# Patient Record
Sex: FEMALE | Race: WHITE | NOT HISPANIC OR LATINO | URBAN - METROPOLITAN AREA
[De-identification: names, ages, dates, MRNs, and addresses within clinical notes are randomized per-mention and may not be internally consistent; named-entity substitution may affect disease eponyms.]

---

## 2018-08-07 ENCOUNTER — TELEPHONE (OUTPATIENT)
Dept: DERMATOLOGY | Facility: CLINIC | Age: 33
End: 2018-08-07

## 2018-08-07 NOTE — TELEPHONE ENCOUNTER
Dermatology Pre-visit Call:    Reason for visit : HS      Any personal history of skin cancers: No    Was the patient referred: No    If the patient was referred, are records obtained: Yes. (If no, then obtain records). Per patient records have been requested    Has the patient seen a dermatologist in the past: Yes. (If yes, obtain records)    Patient Reminders Given:  --Please, make sure you bring an updated list of your medications.   --Plan on being in our facility for approximately one hour, this includes the registration process, office visit, education and check-out process.  If you are having a procedure, more time may be required.     --If you are having a procedure, please, present 15 minutes early.  --Location reviewed.   --If you need to cancel or reschedule, call XXXX  --We look forward to seeing you in Dermatology Clinic.

## 2018-08-10 ENCOUNTER — TELEPHONE (OUTPATIENT)
Dept: DERMATOLOGY | Facility: CLINIC | Age: 33
End: 2018-08-10

## 2018-08-10 ENCOUNTER — OFFICE VISIT (OUTPATIENT)
Dept: DERMATOLOGY | Facility: CLINIC | Age: 33
End: 2018-08-10
Payer: COMMERCIAL

## 2018-08-10 DIAGNOSIS — G43.109 MIGRAINE WITH AURA AND WITHOUT STATUS MIGRAINOSUS, NOT INTRACTABLE: ICD-10-CM

## 2018-08-10 DIAGNOSIS — L70.0 ACNE VULGARIS: Primary | ICD-10-CM

## 2018-08-10 DIAGNOSIS — L73.2 HIDRADENITIS SUPPURATIVA: ICD-10-CM

## 2018-08-10 DIAGNOSIS — L70.0 ACNE VULGARIS: ICD-10-CM

## 2018-08-10 LAB
ALBUMIN SERPL-MCNC: 3.8 G/DL (ref 3.4–5)
ALP SERPL-CCNC: 95 U/L (ref 40–150)
ALT SERPL W P-5'-P-CCNC: 28 U/L (ref 0–50)
ANION GAP SERPL CALCULATED.3IONS-SCNC: 8 MMOL/L (ref 3–14)
AST SERPL W P-5'-P-CCNC: 19 U/L (ref 0–45)
BASOPHILS # BLD AUTO: 0.1 10E9/L (ref 0–0.2)
BASOPHILS NFR BLD AUTO: 0.9 %
BILIRUB SERPL-MCNC: 0.3 MG/DL (ref 0.2–1.3)
BUN SERPL-MCNC: 9 MG/DL (ref 7–30)
CALCIUM SERPL-MCNC: 8.7 MG/DL (ref 8.5–10.1)
CHLORIDE SERPL-SCNC: 108 MMOL/L (ref 94–109)
CO2 SERPL-SCNC: 24 MMOL/L (ref 20–32)
CREAT SERPL-MCNC: 0.49 MG/DL (ref 0.52–1.04)
DIFFERENTIAL METHOD BLD: ABNORMAL
EOSINOPHIL # BLD AUTO: 0.1 10E9/L (ref 0–0.7)
EOSINOPHIL NFR BLD AUTO: 2.6 %
ERYTHROCYTE [DISTWIDTH] IN BLOOD BY AUTOMATED COUNT: 13.7 % (ref 10–15)
GFR SERPL CREATININE-BSD FRML MDRD: >90 ML/MIN/1.7M2
GLUCOSE SERPL-MCNC: 86 MG/DL (ref 70–99)
HBV SURFACE AB SERPL IA-ACNC: >1000 M[IU]/ML
HBV SURFACE AG SERPL QL IA: NONREACTIVE
HCG SERPL QL: NEGATIVE
HCT VFR BLD AUTO: 37 % (ref 35–47)
HCV AB SERPL QL IA: NONREACTIVE
HGB BLD-MCNC: 11.5 G/DL (ref 11.7–15.7)
IMM GRANULOCYTES # BLD: 0 10E9/L (ref 0–0.4)
IMM GRANULOCYTES NFR BLD: 0.2 %
LYMPHOCYTES # BLD AUTO: 1.2 10E9/L (ref 0.8–5.3)
LYMPHOCYTES NFR BLD AUTO: 21.4 %
MCH RBC QN AUTO: 25.8 PG (ref 26.5–33)
MCHC RBC AUTO-ENTMCNC: 31.1 G/DL (ref 31.5–36.5)
MCV RBC AUTO: 83 FL (ref 78–100)
MONOCYTES # BLD AUTO: 0.4 10E9/L (ref 0–1.3)
MONOCYTES NFR BLD AUTO: 7.7 %
NEUTROPHILS # BLD AUTO: 3.6 10E9/L (ref 1.6–8.3)
NEUTROPHILS NFR BLD AUTO: 67.2 %
NRBC # BLD AUTO: 0 10*3/UL
NRBC BLD AUTO-RTO: 0 /100
PLATELET # BLD AUTO: 257 10E9/L (ref 150–450)
POTASSIUM SERPL-SCNC: 3.9 MMOL/L (ref 3.4–5.3)
PROT SERPL-MCNC: 7.8 G/DL (ref 6.8–8.8)
RBC # BLD AUTO: 4.46 10E12/L (ref 3.8–5.2)
SODIUM SERPL-SCNC: 140 MMOL/L (ref 133–144)
WBC # BLD AUTO: 5.4 10E9/L (ref 4–11)

## 2018-08-10 RX ORDER — CLINDAMYCIN PHOSPHATE 10 MG/G
GEL TOPICAL 2 TIMES DAILY
COMMUNITY
End: 2021-10-28

## 2018-08-10 RX ORDER — TRIAMCINOLONE ACETONIDE 1 MG/G
CREAM TOPICAL 2 TIMES DAILY
COMMUNITY
End: 2018-10-12

## 2018-08-10 ASSESSMENT — PAIN SCALES - GENERAL: PAINLEVEL: SEVERE PAIN (7)

## 2018-08-10 NOTE — NURSING NOTE
Dermatology Rooming Note    Sophia De Leon's goals for this visit include:   Chief Complaint   Patient presents with     Derm Problem     HS Sophia would like to discuss treatment options.      Genesis Buck LPN

## 2018-08-10 NOTE — TELEPHONE ENCOUNTER
Kineret Patient Referral Form completed and placed in liaison folder to obtain provider signature.    Spoke with patient to provide update.

## 2018-08-10 NOTE — TELEPHONE ENCOUNTER
PA Initiation    Medication: anakinra (Kineret) 100mg/ 0.67mL syringes  Insurance Company: [employer] - Phone [phone] Fax [phone]  Pharmacy Filling the Rx: [facility] [place], [place] - [address] [facility]  Filling Pharmacy Phone:    Filling Pharmacy Fax:    Start Date: 8/10/2018

## 2018-08-10 NOTE — LETTER
8/10/2018       RE: Sophia De Leon  [address] [facility] [address]     Dear Colleague,    Thank you for referring your patient, Sophia De Leon, to the [facility] DERMATOLOGY at [facility]. Please see a copy of my visit note below.    Dermatology Clinic Visit Note    CHIEF COMPLAINT:  Hidradenitis suppurativa.      DERMATOLOGY PROBLEM LIST:    1. Hidradenitis suppurativa involving the central chest, inframammary breasts and inguinal creases as well as intergluteal cleft.  Present since her 20s.    -Past treatments have included a variety of oral antibiotics, including minocycline and oral rifampin.    -Treated with Humira, which resulted in migraine headaches.    -Placed on isotretinoin, which she was unable to tolerate due to symptoms of dryness, headaches and dizziness.    -Past use of topical clindamycin and antimicrobial body washes.   -Past intralesional kenalog without benefit   2. Acne vulgaris: Face and back with periorifical dermatitis component    HPI: See above past treatment. She states that all treatments helped initially, but then the lesions again began to flare, especially during her pregnancies.  She has also had multiple intralesional Kenalog injections, but again notes that these had only been temporarily effective.  Ms. De Leon notes that she has ongoing drainage and pain from the areas of hidradenitis.      The patient has 3 children.  She is not planning any additional pregnancies.  She is currently abstinent or uses condoms when she is sexually active.      In addition, the patient has severe acne on the face.  She was given a prescription for spironolactone 25 mg.  She did not initiate this treatment due to concern for lightheadedness/dizziness.      PAST MEDICAL HISTORY:   1.  Hidradenitis suppurativa.   2.  Acne vulgaris.   3.  Migraine headaches with aura.      SOCIAL HISTORY:  The patient lives with her  and 3 children in [place].      REVIEW OF  SYSTEMS:  Feels well without additional skin concerns.      FAMILY HISTORY:  No family history of hidradenitis or skin cancer.      PHYSICAL EXAMINATION:   GENERAL:  The patient is a healthy-appearing, 32-year-old female in no distress.   HEENT:  Conjunctivae are clear.   PULMONARY:  Breathing comfortably on room air.   ABDOMEN:  No abdominal distention.   SKIN:  Exam today includes the face, neck, chest, abdomen, back, arms, legs, hands, feet and genital area.  Skin exam is normal except for as follows:   - Examination of the central chest, the superior shoulders, the upper back, the upper and lower cutaneous lips, forehead and nose with inflammatory, pink papules and pustules. Lower cutaneous lip and chin with increased pink monomorphic papules  - Draining, indurated interconnected sinus tracts over the entirety of the mons pubis, the intergluteal cleft and the inframammary folds with yellow and white discharge, nodularity, tenderness and surrounding induration with scarring sinus tracts.      ASSESSMENT AND PLAN:   1.  Hidradenitis suppurativa.  Beltran stage III involving the inframammary folds, inguinal creases and gluteal cleft.  I discussed a variety of treatment options.  As Ms. De Leon did not tolerate Humira well, I was reluctant to consider initiation of infliximab infusions.    -I suggested a trial of anakinra, as this would work via IL1 receptors as opposed to TNF alpha and would provide less concern of immunosuppression.   I will prescribe anakinra 100 mg subcutaneous daily.   -Laboratory studies today were collected, including CBC, hepatitis B-C serologies, CMP, TB QuantiFERON gold testing.  -Continue with antimicrobial benzyl peroxide wash and topical clindamycin solution.     2.  Inflammatory acne on the face, chest and back.  Noted that anakinra may help with the acne.  I encouraged her to initiate her spironolactone 25 mg daily, as I suspect that this will have no impact on blood pressure.  Should  she develop headaches or dizziness, she may discontinue this medication.  If she is having no associated symptoms, we would plan to increase dosing at next visit.      I will see Ms. De Leon back in 2 months' time for reevaluation.      Thank you for this consultation.     Omayra Lerma MD  Dermatology Staff

## 2018-08-10 NOTE — PROGRESS NOTES
Dermatology Clinic Visit Note        CHIEF COMPLAINT:  Hidradenitis suppurativa.      DERMATOLOGY PROBLEM LIST:    1. Hidradenitis suppurativa involving the central chest, inframammary breasts and inguinal creases as well as intergluteal cleft.  Present since her 20s.    -Past treatments have included a variety of oral antibiotics, including minocycline and oral rifampin.    -Treated with Humira, which resulted in migraine headaches.    -Placed on isotretinoin, which she was unable to tolerate due to symptoms of dryness, headaches and dizziness.    -Past use of topical clindamycin and antimicrobial body washes.   -Past intralesional kenalog without benefit   2. Acne vulgaris: Face and back with periorifical dermatitis component    HPI: See above past treatment. She states that all treatments helped initially, but then the lesions again began to flare, especially during her pregnancies.  She has also had multiple intralesional Kenalog injections, but again notes that these had only been temporarily effective.  Ms. De Leon notes that she has ongoing drainage and pain from the areas of hidradenitis.      The patient has 3 children.  She is not planning any additional pregnancies.  She is currently abstinent or uses condoms when she is sexually active.      In addition, the patient has severe acne on the face.  She was given a prescription for spironolactone 25 mg.  She did not initiate this treatment due to concern for lightheadedness/dizziness.      PAST MEDICAL HISTORY:   1.  Hidradenitis suppurativa.   2.  Acne vulgaris.   3.  Migraine headaches with aura.      SOCIAL HISTORY:  The patient lives with her  and 3 children in [place].      REVIEW OF SYSTEMS:  Feels well without additional skin concerns.      FAMILY HISTORY:  No family history of hidradenitis or skin cancer.      PHYSICAL EXAMINATION:   GENERAL:  The patient is a healthy-appearing, 32-year-old female in no distress.   HEENT:  Conjunctivae are  clear.   PULMONARY:  Breathing comfortably on room air.   ABDOMEN:  No abdominal distention.   SKIN:  Exam today includes the face, neck, chest, abdomen, back, arms, legs, hands, feet and genital area.  Skin exam is normal except for as follows:   - Examination of the central chest, the superior shoulders, the upper back, the upper and lower cutaneous lips, forehead and nose with inflammatory, pink papules and pustules. Lower cutaneous lip and chin with increased pink monomorphic papules  - Draining, indurated interconnected sinus tracts over the entirety of the mons pubis, the intergluteal cleft and the inframammary folds with yellow and white discharge, nodularity, tenderness and surrounding induration with scarring sinus tracts.      ASSESSMENT AND PLAN:   1.  Hidradenitis suppurativa.  Beltran stage III involving the inframammary folds, inguinal creases and gluteal cleft.  I discussed a variety of treatment options.  As Ms. De Leon did not tolerate Humira well, I was reluctant to consider initiation of infliximab infusions.    -I suggested a trial of anakinra, as this would work via IL1 receptors as opposed to TNF alpha and would provide less concern of immunosuppression.   I will prescribe anakinra 100 mg subcutaneous daily.   -Laboratory studies today were collected, including CBC, hepatitis B-C serologies, CMP, TB QuantiFERON gold testing.  -Continue with antimicrobial benzyl peroxide wash and topical clindamycin solution.     2.  Inflammatory acne on the face, chest and back.  Noted that anakinra may help with the acne.  I encouraged her to initiate her spironolactone 25 mg daily, as I suspect that this will have no impact on blood pressure.  Should she develop headaches or dizziness, she may discontinue this medication.  If she is having no associated symptoms, we would plan to increase dosing at next visit.      I will see Ms. De Leon back in 2 months' time for reevaluation.      Thank you for this  consultation.     Omayra Lerma MD  Dermatology Staff      Copy: Referred Self, MD  No address on file

## 2018-08-10 NOTE — MR AVS SNAPSHOT
After Visit Summary   8/10/2018    Sophia De Leon    MRN: [id number]           Patient Information     Date Of Birth          1985        Visit Information        Provider Department      8/10/2018 10:15 AM Omayra Lerma MD [facility] Dermatology        Today's Diagnoses     Acne vulgaris    -  1       Follow-ups after your visit        Your next 10 appointments already scheduled     Oct 12, 2018 11:30 AM CDT   (Arrive by 11:15 AM)   Return Visit with Omayra Lerma MD   [facility] ([facility])    [address] [place] [address] [id number] [phone]              Future tests that were ordered for you today     Open Future Orders        Priority Expected Expires Ordered    CBC with platelets differential Routine  8/10/2019 8/10/2018    Comprehensive metabolic panel Routine  8/10/2019 8/10/2018    M Tuberculosis by Quantiferon Routine  8/10/2019 8/10/2018    Hepatitis B Surface Antibody Routine  8/10/2019 8/10/2018    Hepatitis B surface antigen Routine  8/10/2019 8/10/2018    Hepatitis C antibody Routine  8/10/2019 8/10/2018    HCG Qual, Blood ([id number]) Routine  8/10/2019 8/10/2018            Who to contact     Please call your clinic at [phone] to:    Ask questions about your health    Make or cancel appointments    Discuss your medicines    Learn about your test results    Speak to your doctor            Additional Information About Your Visit        MyChart Information     [employer] is an electronic gateway that provides easy, online access to your medical records. With Ed4U, you can request a clinic appointment, read your test results, renew a prescription or communicate with your care team.     To sign up for [employer] visit the website at [email or web address]   You will be asked to enter the access code listed below, as well as some personal information. Please follow the directions to create your username and  password.     Your access code is: [email or web address]  Expires: 2018 11:03 AM     Your access code will  in 90 days. If you need help or a new code, please contact your [facility] or call [phone] for assistance.        Care EveryWhere ID     This is your Care EveryWhere ID. This could be used by other organizations to access your [facility] medical records  [id number]         Blood Pressure from Last 3 Encounters:   No data found for BP    Weight from Last 3 Encounters:   No data found for Wt                 Today's Medication Changes          These changes are accurate as of 8/10/18 10:39 AM.  If you have any questions, ask your nurse or doctor.               Start taking these medicines.        Dose/Directions    metroNIDAZOLE 0.75 % cream   Commonly known as:  METROCREAM   Used for:  Acne vulgaris   Started by:  Omayra Lerma MD        To face daily   Quantity:  45 g   Refills:  11            Where to get your medicines      These medications were sent to [facility] [address] [place], [place] [address] [id number] [facility]  [id number] [place] [address] [facility] [address]    Hours:  24-hours Phone:  [phone]     metroNIDAZOLE 0.75 % cream                Primary Care Provider    None Specified       No primary provider on file.        Equal Access to Services     GARRETT GIBSON AH: Emilie peraltao Somadeleineali, waaxda luqadaha, qaybta kaalmada adeegyada, sharon steen. So [facility] [phone].    ATENCIÓN: Si habla español, tiene a thapa disposición servicios gratuitos de asistencia lingüística. Llame al [phone].    We comply with applicable federal civil rights laws and [place] laws. We do not discriminate on the basis of race, color, national origin, age, disability, sex, sexual orientation, or gender identity.            Thank you!     Thank you for choosing [facility] DERMATOLOGY  for your care. Our goal  is always to provide you with excellent care. Hearing back from our patients is one way we can continue to improve our services. Please take a few minutes to complete the written survey that you may receive in the mail after your visit with us. Thank you!             Your Updated Medication List - Protect others around you: Learn how to safely use, store and throw away your medicines at www.disposemymeds.org.          This list is accurate as of 8/10/18 10:39 AM.  Always use your most recent med list.                   Brand Name Dispense Instructions for use Diagnosis    clindamycin 1 % topical gel    CLINDAMAX     Apply topically 2 times daily    Acne vulgaris       IBUPROFEN PO      PRN    Acne vulgaris       metroNIDAZOLE 0.75 % cream    METROCREAM    45 g    To face daily    Acne vulgaris       triamcinolone 0.1 % cream    KENALOG     Apply topically 2 times daily    Acne vulgaris       TYLENOL PO      Take 500 mg by mouth PRN    Acne vulgaris

## 2018-08-12 PROBLEM — G43.109 MIGRAINE WITH AURA AND WITHOUT STATUS MIGRAINOSUS, NOT INTRACTABLE: Status: ACTIVE | Noted: 2018-08-12

## 2018-08-12 PROBLEM — L73.2 HIDRADENITIS SUPPURATIVA: Status: ACTIVE | Noted: 2018-08-12

## 2018-08-12 PROBLEM — L70.0 ACNE VULGARIS: Status: ACTIVE | Noted: 2018-08-12

## 2018-08-13 LAB
GAMMA INTERFERON BACKGROUND BLD IA-ACNC: 0.04 IU/ML
M TB IFN-G BLD-IMP: NEGATIVE
M TB IFN-G CD4+ BCKGRND COR BLD-ACNC: >10 IU/ML
MITOGEN IGNF BCKGRD COR BLD-ACNC: 0 IU/ML
MITOGEN IGNF BCKGRD COR BLD-ACNC: 0.01 IU/ML

## 2018-08-13 NOTE — TELEPHONE ENCOUNTER
Prior Authorization Approval    Authorization Effective Date: 7/11/2018  Authorization Expiration Date: 8/10/2019  Medication: anakinra (Kineret) 100mg/ 0.67mL syringes   Approved Dose/Quantity: 1 injection daily  Reference #: [facility] key# [id number]   Insurance Company: [employer] - Phone [phone] Fax [phone]  Expected CoPay: $0     CoPay Card Available: No   Foundation Assistance Needed:    Which Pharmacy is filling the prescription (Not needed for infusion/clinic administered): [facility] [place], [place] - [address] [place] [address]  Pharmacy Notified: Yes  Patient Notified: Yes    **Escript released to [employer] [facility] pharm; approval faxed there as well

## 2018-08-13 NOTE — TELEPHONE ENCOUNTER
Referral form, insurance info, and [place] approval faxed to [facility] via fax# [phone].

## 2018-10-12 ENCOUNTER — OFFICE VISIT (OUTPATIENT)
Dept: DERMATOLOGY | Facility: CLINIC | Age: 33
End: 2018-10-12
Payer: COMMERCIAL

## 2018-10-12 DIAGNOSIS — L70.0 ACNE VULGARIS: Primary | ICD-10-CM

## 2018-10-12 DIAGNOSIS — L73.2 HIDRADENITIS SUPPURATIVA: ICD-10-CM

## 2018-10-12 RX ORDER — TRIAMCINOLONE ACETONIDE 1 MG/G
CREAM TOPICAL
Qty: 80 G | Refills: 1 | Status: SHIPPED | OUTPATIENT
Start: 2018-10-12 | End: 2022-03-29

## 2018-10-12 ASSESSMENT — PAIN SCALES - GENERAL: PAINLEVEL: NO PAIN (0)

## 2018-10-12 NOTE — LETTER
"10/12/2018       RE: Sophia De Leon  [address] [address]     Dear Colleague,    Thank you for referring your patient, Sophia De Leon, to the [facility] DERMATOLOGY at [facility]. Please see a copy of my visit note below.    [facility] Dermatology Note      Dermatology Problem List:  1. Hidradenitis suppurativa involving the central chest, inframammary breasts and inguinal creases as well as intergluteal cleft.  Present since her 20s.    -Past treatments have included a variety of oral antibiotics, including minocycline and oral rifampin.    -Treated with Humira, which resulted in migraine headaches.    -Placed on isotretinoin, which she was unable to tolerate due to symptoms of dryness, headaches and dizziness.    -Past use of topical clindamycin and antimicrobial body washes.   -Past intralesional kenalog without benefit   2. Acne vulgaris: Face and back with periorifical dermatitis component    Encounter Date: Oct 12, 2018    CC:   Chief Complaint   Patient presents with     Derm Problem     H S follow up, Sophia states \" it is off and on.\"        History of Present Illness:  Ms. Sophia De Leon is a 32 year old female who presents as a follow-up for Beltran stage III hidradenitis suppurativa and inflammatory acne.  She has a long history of hidradenitis since her 20s and has tried multiple topicals, intralesional Kenalog, and oral medications without significant improvement.  At the last visit on August 10, 2018, she was started on Anakinra 100 mg subcutaneous daily.  She was to continue benzoyl peroxide wash and topical clindamycin solution. For her acne, she was also initiated on spironolactone 25 mg daily.    Since her last visit, she did not start the anakinra or spironolactone.  She has fearful that the medications will not work, she has been disappointed in the past with multiple others.  She is also concerned about side effects.  She " wants to discuss these medications in detail more today.  She has been using clindamycin solution, benzoyl peroxide wash, and salicylic acid wash.      Past Medical History:   Patient Active Problem List   Diagnosis     Acne vulgaris     Hidradenitis suppurativa     Migraine with aura and without status migrainosus, not intractable     No past medical history on file.  No past surgical history on file.    Social History:   reports that she has never smoked. She has never used smokeless tobacco.    Family History:  Family History   Problem Relation Age of Onset     Melanoma No family hx of      Skin Cancer No family hx of        Medications:  Current Outpatient Prescriptions   Medication Sig Dispense Refill     Acetaminophen (TYLENOL PO) Take 500 mg by mouth PRN       clindamycin (CLINDAMAX) 1 % topical gel Apply topically 2 times daily       IBUPROFEN PO PRN       metroNIDAZOLE (METROCREAM) 0.75 % cream To face daily 45 g 11     triamcinolone (KENALOG) 0.1 % cream Apply topically 2 times daily       anakinra (KINERET) 100 MG/0.67ML SOSY injection Inject 0.67 mLs (100 mg) Subcutaneous daily (Patient not taking: Reported on 10/12/2018) 20.1 mL 3        No Known Allergies      Review of Systems:  -Constitutional: The patient denies fatigue, fevers, chills, unintended weight loss, and night sweats.  -HEENT: Patient denies nonhealing oral sores.  -Skin: As above in HPI. No additional skin concerns.    Physical exam:  Vitals: There were no vitals taken for this visit.  GEN: This is a well developed, well-nourished female in no acute distress, in a pleasant mood.    SKIN: Focused examination of the chest and back was performed.  -Indurated interconnecting sinus tracts with nodularity numerous over the central chest and inframammary folds.  -Numerous inflamed papules with scattered hyperpigmented macules over back and upper chest  -No other lesions of concern on areas examined.     Impression/Plan:  1. Hidradenitis  Suppurativa - Beltran Stage III with involvement of the inframammary fold, central chest, inguinal creases and gluteal cleft.  Patient has previously failed multiple treatments in the past including both topical and oral medications.  She did not tolerate Humira, so still reluctant to consider Infliximab.  Still feel that the best option for this patient will be Anakinra.    Again discussed in detail Anakinra including mechanism of action, dosing and possible side effects.    Encouraged patient to start Anakinra at this time.    Will look into pharmacy injection teaching.    2. Inflammatory Acne - Involvement of the face, chest and back.     Again discussed that anakinra may help with the acne, however feel that addition of spironolactone may be of benefit.    Reviewed in detail Spironolactone including dosing and possible side effects.    Start Spironolactone 25mg daily, with plan to increase dose at next visit if well tolerated.    3. NUB - Dermal nevus vs. Poroma vs neuroma on right 4th finger, palmar aspect     Appears benign on examination    Will plan to shave at next visit.      Follow-up in 3 months, earlier for new or changing lesions.       Dr. Lerma staffed the patient.    Staff Involved:  Resident(Radha Sheldon)/Staff(as above)    I have personally examined this patient and agree with Dr. Sheldon' documentation and plan of care. I have reviewed and amended the resident's note above. The documentation accurately reflects my clinical observations, diagnoses, treatment and follow-up plans.     Omayra Lerma MD  Dermatology Staff      Again, thank you for allowing me to participate in the care of your patient.      Sincerely,    Omayra Lerma MD

## 2018-10-12 NOTE — LETTER
Date:October 15, 2018      Patient was self referred, no letter generated. Do not send.        [facility] Health Information

## 2018-10-12 NOTE — NURSING NOTE
"Dermatology Rooming Note    Sophia De Leon's goals for this visit include:   Chief Complaint   Patient presents with     Derm Problem     H S follow up, Sophia states \" it is off and on.\"      Genesis Buck LPN   "

## 2018-10-12 NOTE — MR AVS SNAPSHOT
After Visit Summary   10/12/2018    Sophia De Leon    MRN: [id number]           Patient Information     Date Of Birth          1985        Visit Information        Provider Department      10/12/2018 11:30 AM Omayra Lerma MD M [facility] Dermatology         Follow-ups after your visit        Your next 10 appointments already scheduled     2019 11:45 AM CST   (Arrive by 11:30 AM)   Return Visit with MD JONNATHAN Mojica [facility] Dermatology ([facility])    [address] [place] [id number] [phone]              Who to contact     Please call your clinic at [phone] to:    Ask questions about your health    Make or cancel appointments    Discuss your medicines    Learn about your test results    Speak to your doctor            Additional Information About Your Visit        MyChart Information     Geelbe is an electronic gateway that provides easy, online access to your medical records. With Geelbe, you can request a clinic appointment, read your test results, renew a prescription or communicate with your care team.     To sign up for [email or web address] visit the website at [email or web address]   You will be asked to enter the access code listed below, as well as some personal information. Please follow the directions to create your username and password.     Your access code is: [email or web address]  Expires: 2018  6:31 AM     Your access code will  in 90 days. If you need help or a new code, please contact your [facility] or call [phone] for assistance.        Care EveryWhere ID     This is your Care EveryWhere ID. This could be used by other organizations to access your May medical records  [id number]         Blood Pressure from Last 3 Encounters:   No data found for BP    Weight from Last 3 Encounters:   No data found for Wt              Today, you had the following     No  orders found for display       Primary Care Provider    None Specified       No primary provider on file.        Equal Access to Services     FLETCHERPATRICIA ARTHUR : Hadii renée Escoto, jose luis galarza, franciscoamanuel leemasharon leeangelambreen steen. So [facility] [phone].    ATENCIÓN: Si habla español, tiene a thapa disposición servicios gratuitos de asistencia lingüística. Llame al [phone].    We comply with applicable federal civil rights laws and [place] laws. We do not discriminate on the basis of race, color, national origin, age, disability, sex, sexual orientation, or gender identity.            Thank you!     Thank you for choosing [facility] DERMATOLOGY  for your care. Our goal is always to provide you with excellent care. Hearing back from our patients is one way we can continue to improve our services. Please take a few minutes to complete the written survey that you may receive in the mail after your visit with us. Thank you!             Your Updated Medication List - Protect others around you: Learn how to safely use, store and throw away your medicines at www.disposemymeds.org.          This list is accurate as of 10/12/18 12:37 PM.  Always use your most recent med list.                   Brand Name Dispense Instructions for use Diagnosis    anakinra 100 MG/0.67ML Sosy injection    KINERET    20.1 mL    Inject 0.67 mLs (100 mg) Subcutaneous daily    Hidradenitis suppurativa       clindamycin 1 % topical gel    CLINDAMAX     Apply topically 2 times daily    Acne vulgaris       IBUPROFEN PO      PRN    Acne vulgaris       metroNIDAZOLE 0.75 % cream    METROCREAM    45 g    To face daily    Acne vulgaris       triamcinolone 0.1 % cream    KENALOG     Apply topically 2 times daily    Acne vulgaris       TYLENOL PO      Take 500 mg by mouth PRN    Acne vulgaris

## 2018-10-12 NOTE — PROGRESS NOTES
"[facility] Dermatology Note      Dermatology Problem List:  1. Hidradenitis suppurativa involving the central chest, inframammary breasts and inguinal creases as well as intergluteal cleft.  Present since her 20s.    -Past treatments have included a variety of oral antibiotics, including minocycline and oral rifampin.    -Treated with Humira, which resulted in migraine headaches.    -Placed on isotretinoin, which she was unable to tolerate due to symptoms of dryness, headaches and dizziness.    -Past use of topical clindamycin and antimicrobial body washes.   -Past intralesional kenalog without benefit   2. Acne vulgaris: Face and back with periorifical dermatitis component    Encounter Date: Oct 12, 2018    CC:   Chief Complaint   Patient presents with     Derm Problem     H S follow up, Sophia states \" it is off and on.\"        History of Present Illness:  Ms. Sophia De Leon is a 32 year old female who presents as a follow-up for Beltran stage III hidradenitis suppurativa and inflammatory acne.  She has a long history of hidradenitis since her 20s and has tried multiple topicals, intralesional Kenalog, and oral medications without significant improvement.  At the last visit on August 10, 2018, she was started on Anakinra 100 mg subcutaneous daily.  She was to continue benzoyl peroxide wash and topical clindamycin solution. For her acne, she was also initiated on spironolactone 25 mg daily.    Since her last visit, she did not start the anakinra or spironolactone.  She has fearful that the medications will not work, she has been disappointed in the past with multiple others.  She is also concerned about side effects.  She wants to discuss these medications in detail more today.  She has been using clindamycin solution, benzoyl peroxide wash, and salicylic acid wash.      Past Medical History:   Patient Active Problem List   Diagnosis     Acne vulgaris     Hidradenitis suppurativa     Migraine " with aura and without status migrainosus, not intractable     No past medical history on file.  No past surgical history on file.    Social History:   reports that she has never smoked. She has never used smokeless tobacco.    Family History:  Family History   Problem Relation Age of Onset     Melanoma No family hx of      Skin Cancer No family hx of        Medications:  Current Outpatient Prescriptions   Medication Sig Dispense Refill     Acetaminophen (TYLENOL PO) Take 500 mg by mouth PRN       clindamycin (CLINDAMAX) 1 % topical gel Apply topically 2 times daily       IBUPROFEN PO PRN       metroNIDAZOLE (METROCREAM) 0.75 % cream To face daily 45 g 11     triamcinolone (KENALOG) 0.1 % cream Apply topically 2 times daily       anakinra (KINERET) 100 MG/0.67ML SOSY injection Inject 0.67 mLs (100 mg) Subcutaneous daily (Patient not taking: Reported on 10/12/2018) 20.1 mL 3        No Known Allergies      Review of Systems:  -Constitutional: The patient denies fatigue, fevers, chills, unintended weight loss, and night sweats.  -HEENT: Patient denies nonhealing oral sores.  -Skin: As above in HPI. No additional skin concerns.    Physical exam:  Vitals: There were no vitals taken for this visit.  GEN: This is a well developed, well-nourished female in no acute distress, in a pleasant mood.    SKIN: Focused examination of the chest and back was performed.  -Indurated interconnecting sinus tracts with nodularity numerous over the central chest and inframammary folds.  -Numerous inflamed papules with scattered hyperpigmented macules over back and upper chest  -No other lesions of concern on areas examined.     Impression/Plan:  1. Hidradenitis Suppurativa - Beltran Stage III with involvement of the inframammary fold, central chest, inguinal creases and gluteal cleft.  Patient has previously failed multiple treatments in the past including both topical and oral medications.  She did not tolerate Humira, so still reluctant to  consider Infliximab.  Still feel that the best option for this patient will be Anakinra.    Again discussed in detail Anakinra including mechanism of action, dosing and possible side effects.    Encouraged patient to start Anakinra at this time.    Will look into pharmacy injection teaching.    2. Inflammatory Acne - Involvement of the face, chest and back.     Again discussed that anakinra may help with the acne, however feel that addition of spironolactone may be of benefit.    Reviewed in detail Spironolactone including dosing and possible side effects.    Start Spironolactone 25mg daily, with plan to increase dose at next visit if well tolerated.    3. NUB - Dermal nevus vs. Poroma vs neuroma on right 4th finger, palmar aspect     Appears benign on examination    Will plan to shave at next visit.      Follow-up in 3 months, earlier for new or changing lesions.       Dr. Lerma staffed the patient.    Staff Involved:  Resident(Radha Sheldon)/Staff(as above)    I have personally examined this patient and agree with Dr. Sheldon' documentation and plan of care. I have reviewed and amended the resident's note above. The documentation accurately reflects my clinical observations, diagnoses, treatment and follow-up plans.     Omayra Lerma MD  Dermatology Staff

## 2019-05-10 ENCOUNTER — OFFICE VISIT (OUTPATIENT)
Dept: DERMATOLOGY | Facility: CLINIC | Age: 34
End: 2019-05-10
Payer: COMMERCIAL

## 2019-05-10 DIAGNOSIS — D48.5 NEOPLASM OF UNCERTAIN BEHAVIOR OF SKIN: ICD-10-CM

## 2019-05-10 DIAGNOSIS — L73.2 HIDRADENITIS SUPPURATIVA: Primary | ICD-10-CM

## 2019-05-10 DIAGNOSIS — L30.9 DERMATITIS: ICD-10-CM

## 2019-05-10 RX ORDER — TACROLIMUS 1 MG/G
OINTMENT TOPICAL 2 TIMES DAILY
Qty: 100 G | Refills: 3 | Status: SHIPPED | OUTPATIENT
Start: 2019-05-10 | End: 2022-11-28

## 2019-05-10 ASSESSMENT — PAIN SCALES - GENERAL: PAINLEVEL: MODERATE PAIN (5)

## 2019-05-10 NOTE — LETTER
Date:May 14, 2019      Patient was self referred, no letter generated. Do not send.        [facility] Health Information

## 2019-05-10 NOTE — LETTER
"5/10/2019       RE: Sophia De Leon  [address] [address]     Dear Colleague,    Thank you for referring your patient, Sophia De Leon, to the [facility] DERMATOLOGY at [facility]. Please see a copy of my visit note below.    [facility] Dermatology Note      Dermatology Problem List:  1. Hidradenitis suppurativa involving the central chest, inframammary breasts and inguinal creases as well as intergluteal cleft.  Present since her 20s.    -Current: Clindamycin gel, BP wash, sal acid wash  -Past: oral antibiotics (minocycline and rifampin), Humira (migraines), isotretinoin (dryness, headaches and dizziness), ILK (no benefit?), spironolactone (she didn't start). Anakinra prescribed (did not start).  2. Acne vulgaris: Face and back with periorifical dermatitis component  3. Irritant dermatitis in areas of Hs, starting protopic 0.1% BID  4. Neoplasm of uncertain behavior on the R 4th finger- derm surgery referral.     Encounter Date: May 10, 2019    CC:   Chief Complaint   Patient presents with     Derm Problem     HS follow up, Sophia states \" It is about the same.\"        History of Present Illness:  Ms. Sophia De Leon is a 33 year old female who presents as a follow-up for Beltran stage III hidradenitis suppurativa and inflammatory acne.  She has a long history of hidradenitis since her 20s and has tried multiple topicals, intralesional Kenalog, and oral medications without significant improvement.      Since her last visit, she did not start the anakinra or spironolactone and in the past has been concerned about side effects. She is using clindamycin gel and has used triamcinolone to areas of itching scars.     Past Medical History:   Patient Active Problem List   Diagnosis     Acne vulgaris     Hidradenitis suppurativa     Migraine with aura and without status migrainosus, not intractable     No past medical history on file.  No past surgical " history on file.    Social History:   reports that she has never smoked. She has never used smokeless tobacco.    Family History:  Family History   Problem Relation Age of Onset     Melanoma No family hx of      Skin Cancer No family hx of        Medications:  Current Outpatient Medications   Medication Sig Dispense Refill     clindamycin (CLINDAMAX) 1 % topical gel Apply topically 2 times daily       triamcinolone (KENALOG) 0.1 % cream Twice daily to rash area on the abdomen as needed. Avoid face. 80 g 1     Acetaminophen (TYLENOL PO) Take 500 mg by mouth PRN       anakinra (KINERET) 100 MG/0.67ML SOSY injection Inject 0.67 mLs (100 mg) Subcutaneous daily (Patient not taking: Reported on 10/12/2018) 20.1 mL 3     IBUPROFEN PO PRN       metroNIDAZOLE (METROCREAM) 0.75 % cream To face daily 45 g 11        No Known Allergies      Review of Systems:  -Constitutional: The patient denies fatigue, fevers, chills, unintended weight loss, and night sweats.  -HEENT: Patient denies nonhealing oral sores.  -Skin: As above in HPI. No additional skin concerns.    Physical exam:  Vitals: There were no vitals taken for this visit.  GEN: This is a well developed, well-nourished female in no acute distress, in a pleasant mood.    SKIN: Focused examination of the chest and back was performed.  -Indurated interconnecting sinus tracts with nodularity numerous over the central chest and inframammary folds.  -Numerous inflamed papules with scattered hyperpigmented macules over back and upper chest  -No other lesions of concern on areas examined.   - R palmar 4th finger with 11 mm soft plaque    Impression/Plan:  1. Hidradenitis Suppurativa - Beltran Stage III with involvement of the inframammary fold, central chest, inguinal creases and gluteal cleft.  Patient has previously failed multiple treatments in the past including both topical and oral medications.  She did not tolerate Humira, so still reluctant to consider other options  including spironolactone, anakinra, other TNF blockers. Could consider otezla or metformin, but patient not interested in treatment at the time. Protopic 0.1% to areas of irritation over scars, avoid triamcinolone.     2. Inflammatory Acne - Involvement of the face, chest and back.     Clindamycin gel    3. NUB - suspected neuroma on right 4th finger, palmar aspect     Derm Surg referral due to size and location    Follow-up in 3 months, earlier for new or changing lesions.       Dr. Lerma staffed the patient.    Staff Involved:  Resident(Angela)/Staff(as above)      I have personally examined this patient and agree with Dr. Miller's documentation and plan of care. I have reviewed and amended the resident's note above. The documentation accurately reflects my clinical observations, diagnoses, treatment and follow-up plans.     Omayra Lerma MD  Dermatology Staff      Again, thank you for allowing me to participate in the care of your patient.      Sincerely,    Omayra Lerma MD

## 2019-05-10 NOTE — NURSING NOTE
"Dermatology Rooming Note    Sophia De Leon's goals for this visit include:   Chief Complaint   Patient presents with     Derm Problem     HS follow up, Sophia states \" It is about the same.\"      Genesis Buck LPN   "

## 2019-05-10 NOTE — PROGRESS NOTES
"[facility] Dermatology Note      Dermatology Problem List:  1. Hidradenitis suppurativa involving the central chest, inframammary breasts and inguinal creases as well as intergluteal cleft.  Present since her 20s.    -Current: Clindamycin gel, BP wash, sal acid wash  -Past: oral antibiotics (minocycline and rifampin), Humira (migraines), isotretinoin (dryness, headaches and dizziness), ILK (no benefit?), spironolactone (she didn't start). Anakinra prescribed (did not start).  2. Acne vulgaris: Face and back with periorifical dermatitis component  3. Irritant dermatitis in areas of Hs, starting protopic 0.1% BID  4. Neoplasm of uncertain behavior on the R 4th finger- derm surgery referral.     Encounter Date: May 10, 2019    CC:   Chief Complaint   Patient presents with     Derm Problem     HS follow up, Sophia states \" It is about the same.\"        History of Present Illness:  Ms. Sophia De Leon is a 33 year old female who presents as a follow-up for Beltran stage III hidradenitis suppurativa and inflammatory acne.  She has a long history of hidradenitis since her 20s and has tried multiple topicals, intralesional Kenalog, and oral medications without significant improvement.      Since her last visit, she did not start the anakinra or spironolactone and in the past has been concerned about side effects. She is using clindamycin gel and has used triamcinolone to areas of itching scars.     Past Medical History:   Patient Active Problem List   Diagnosis     Acne vulgaris     Hidradenitis suppurativa     Migraine with aura and without status migrainosus, not intractable     No past medical history on file.  No past surgical history on file.    Social History:   reports that she has never smoked. She has never used smokeless tobacco.    Family History:  Family History   Problem Relation Age of Onset     Melanoma No family hx of      Skin Cancer No family hx of        Medications:  Current Outpatient " Medications   Medication Sig Dispense Refill     clindamycin (CLINDAMAX) 1 % topical gel Apply topically 2 times daily       triamcinolone (KENALOG) 0.1 % cream Twice daily to rash area on the abdomen as needed. Avoid face. 80 g 1     Acetaminophen (TYLENOL PO) Take 500 mg by mouth PRN       anakinra (KINERET) 100 MG/0.67ML SOSY injection Inject 0.67 mLs (100 mg) Subcutaneous daily (Patient not taking: Reported on 10/12/2018) 20.1 mL 3     IBUPROFEN PO PRN       metroNIDAZOLE (METROCREAM) 0.75 % cream To face daily 45 g 11        No Known Allergies      Review of Systems:  -Constitutional: The patient denies fatigue, fevers, chills, unintended weight loss, and night sweats.  -HEENT: Patient denies nonhealing oral sores.  -Skin: As above in HPI. No additional skin concerns.    Physical exam:  Vitals: There were no vitals taken for this visit.  GEN: This is a well developed, well-nourished female in no acute distress, in a pleasant mood.    SKIN: Focused examination of the chest and back was performed.  -Indurated interconnecting sinus tracts with nodularity numerous over the central chest and inframammary folds.  -Numerous inflamed papules with scattered hyperpigmented macules over back and upper chest  -No other lesions of concern on areas examined.   - R palmar 4th finger with 11 mm soft plaque    Impression/Plan:  1. Hidradenitis Suppurativa - Beltran Stage III with involvement of the inframammary fold, central chest, inguinal creases and gluteal cleft.  Patient has previously failed multiple treatments in the past including both topical and oral medications.  She did not tolerate Humira, so still reluctant to consider other options including spironolactone, anakinra, other TNF blockers. Could consider otezla or metformin, but patient not interested in treatment at the time. Protopic 0.1% to areas of irritation over scars, avoid triamcinolone.     2. Inflammatory Acne - Involvement of the face, chest and back.      Clindamycin gel    3. NUB - suspected neuroma on right 4th finger, palmar aspect     Derm Surg referral due to size and location    Follow-up in 3 months, earlier for new or changing lesions.       Dr. Lerma staffed the patient.    Staff Involved:  Resident(Angela)/Staff(as above)      I have personally examined this patient and agree with Dr. Miller's documentation and plan of care. I have reviewed and amended the resident's note above. The documentation accurately reflects my clinical observations, diagnoses, treatment and follow-up plans.     Omayra Lerma MD  Dermatology Staff

## 2019-06-06 NOTE — TELEPHONE ENCOUNTER
FUTURE VISIT INFORMATION      FUTURE VISIT INFORMATION:    Date: 6.25    Time: 130    Location: Derm Surg  REFERRAL INFORMATION:    Referring provider:  Dr. Lerma    Referring providers clinic:  Derm    Reason for visit/diagnosis  Biopsy     RECORDS REQUESTED FROM:       Clinic name Comments Records Status Imaging Status   P Derm 5.10.19 Clinic note  Referral  Photos in media Epic Epic

## 2019-06-25 ENCOUNTER — PRE VISIT (OUTPATIENT)
Dept: DERMATOLOGY | Facility: CLINIC | Age: 34
End: 2019-06-25

## 2019-06-25 ENCOUNTER — OFFICE VISIT (OUTPATIENT)
Dept: DERMATOLOGY | Facility: CLINIC | Age: 34
End: 2019-06-25
Payer: COMMERCIAL

## 2019-06-25 VITALS — HEART RATE: 86 BPM | DIASTOLIC BLOOD PRESSURE: 61 MMHG | SYSTOLIC BLOOD PRESSURE: 111 MMHG

## 2019-06-25 DIAGNOSIS — D48.9 NEOPLASM OF UNCERTAIN BEHAVIOR: Primary | ICD-10-CM

## 2019-06-25 RX ORDER — LORATADINE 10 MG/1
10 TABLET ORAL DAILY
COMMUNITY
End: 2022-11-28

## 2019-06-25 ASSESSMENT — PAIN SCALES - GENERAL
PAINLEVEL: NO PAIN (0)
PAINLEVEL: MILD PAIN (2)

## 2019-06-25 NOTE — PROGRESS NOTES
DERMATOLOGIC EXCISION BIOPSY SURGERY PROCEDURE NOTE     Dermatology Surgery Clinic  [facility]  [address] [place] [id number]    Dermatology Problem List:  0. NUB on the R 4th finger, s/p excisional Bx 06/25/2019; DDx Neurofibroma vs Cyst   1. Hidradenitis suppurativa involving the central chest, inframammary breasts and inguinal creases as well as intergluteal cleft.  Present since her 20s.    -Current: Clindamycin gel, BP wash, sal acid wash  -Past: oral antibiotics (minocycline and rifampin), Humira (migraines), isotretinoin (dryness, headaches and dizziness), ILK (no benefit?), spironolactone (she didn't start). Anakinra prescribed (did not start).  2. Acne vulgaris: Face and back with periorifical dermatitis component  3. Irritant dermatitis in areas of Hs, starting protopic 0.1% BID    NAME OF PROCEDURE: Excision with complex linear closure  Staff surgeon: Ramu Hardy MD  PRE-OPERATIVE DIAGNOSIS:  NUB  POST-OPERATIVE DIAGNOSIS: Same   FINAL EXCISION SIZE(EXCISION DEFECT SIZE): 1.1 cm, with 0 mm margin   FINAL REPAIR LENGTH: 1.8 cm   INDICATIONS: This patient presented with a 1.1cm NUB of the R palmar 4th finger. Excision was indicated.   We discussed the principles of treatment and most likely complications including bleeding, infection, scarring, alteration in skin color and sensation, wound dehiscence,muscle weakness in the area, or recurrence of the lesion or disease. We reviewed that on occasion, after healing, a secondary procedure or revision may be recommended in order to obtain the best cosmetic or functional result.     PROCEDURE: The patient was taken to the operative suite. Time-out was performed. The treatment area was anesthetized with 1% lidocaine and epinephrine (1:100,000). The area was washed with Hibiclens, rinsed with saline and draped with sterile towels. The lesion was delineated and excised down to deep subcutaneous fat in an  elliptical manner. Hemostasis was obtained by electrocoagulation.     REPAIR: A complex layered linear closure was selected as the procedure which would maximally preserve both function and cosmesis and for the following reasons: 1) the defect was widely undermined; 2) multiple deep plication and layered sutures placed; 3) wound size, depth, tension, and location.   After the excision of the tumor, the area was extensively and carefully undermined using blunt Metzenbaum scissors. Hemostasis was obtained with spot electrocautery and ligation of vessels where necessary. An initial deep plication sutures of 4-0 Monocryl sutures  were placed in the deep, subcutaneous and fascial planes to appose the lateral margins.  The subcutaneous and dermal layers were then closed with additional 4-0 Monocryl sutures. The epidermis was then carefully approximated along the length of the wound using 4-0 Prolene sutures.   The final wound length was 1.8 cm. A total of 3.0 ml of anesthesia was administered for all surgical sites. Estimated blood loss was less than 10 ml for all surgical sites. A sterile pressure dressing was applied and wound care instructions, with a written handout, were given. The patient was discharged from the [facility] alert and ambulatory.    Follow up for suture removal as needed.        Staff Involved:    Scribe Disclosure  I, Emilio Rodriguez, am serving as a scribe to document services personally performed by Dr. Ramu Hardy, based on data collection and the provider's statements to me.     Attending attestation:  I personally performed the entire procedure.  I have reviewed the note and edited it as necessary, and agree with its contents.    Ramu Hardy M.D.  Professor  Director of Dermatologic Surgery  Department of Dermatology  [facility]    Dermatology Surgery Clinic  [facility]  [address] [place] [address]  [id number]

## 2019-06-25 NOTE — LETTER
6/25/2019       RE: Sophia De Leon  [address] [facility] [address]     Dear Colleague,    Thank you for referring your patient, Sophia De Leon, to the [facility] DERMATOLOGIC SURGERY at [facility]. Please see a copy of my visit note below.    DERMATOLOGIC EXCISION BIOPSY SURGERY PROCEDURE NOTE     Dermatology Surgery Clinic  [facility]  [address] [place] [address]    Dermatology Problem List:  0. NUB on the R 4th finger, s/p excisional Bx 06/25/2019; DDx Neurofibroma vs Cyst   1. Hidradenitis suppurativa involving the central chest, inframammary breasts and inguinal creases as well as intergluteal cleft.  Present since her 20s.    -Current: Clindamycin gel, BP wash, sal acid wash  -Past: oral antibiotics (minocycline and rifampin), Humira (migraines), isotretinoin (dryness, headaches and dizziness), ILK (no benefit?), spironolactone (she didn't start). Anakinra prescribed (did not start).  2. Acne vulgaris: Face and back with periorifical dermatitis component  3. Irritant dermatitis in areas of Hs, starting protopic 0.1% BID    NAME OF PROCEDURE: Excision with complex linear closure  Staff surgeon: Ramu Hardy MD  PRE-OPERATIVE DIAGNOSIS:  NUB  POST-OPERATIVE DIAGNOSIS: Same   FINAL EXCISION SIZE(EXCISION DEFECT SIZE): 1.1 cm, with 0 mm margin   FINAL REPAIR LENGTH: 1.8 cm   INDICATIONS: This patient presented with a 1.1cm NUB of the R palmar 4th finger. Excision was indicated.   We discussed the principles of treatment and most likely complications including bleeding, infection, scarring, alteration in skin color and sensation, wound dehiscence,muscle weakness in the area, or recurrence of the lesion or disease. We reviewed that on occasion, after healing, a secondary procedure or revision may be recommended in order to obtain the best cosmetic or functional result.     PROCEDURE: The patient was taken to the  operative suite. Time-out was performed. The treatment area was anesthetized with 1% lidocaine and epinephrine (1:100,000). The area was washed with Hibiclens, rinsed with saline and draped with sterile towels. The lesion was delineated and excised down to deep subcutaneous fat in an elliptical manner. Hemostasis was obtained by electrocoagulation.     REPAIR: A complex layered linear closure was selected as the procedure which would maximally preserve both function and cosmesis and for the following reasons: 1) the defect was widely undermined; 2) multiple deep plication and layered sutures placed; 3) wound size, depth, tension, and location.   After the excision of the tumor, the area was extensively and carefully undermined using blunt Metzenbaum scissors. Hemostasis was obtained with spot electrocautery and ligation of vessels where necessary. An initial deep plication sutures of 4-0 Monocryl sutures  were placed in the deep, subcutaneous and fascial planes to appose the lateral margins.  The subcutaneous and dermal layers were then closed with additional 4-0 Monocryl sutures. The epidermis was then carefully approximated along the length of the wound using 4-0 Prolene sutures.   The final wound length was 1.8 cm. A total of 3.0 ml of anesthesia was administered for all surgical sites. Estimated blood loss was less than 10 ml for all surgical sites. A sterile pressure dressing was applied and wound care instructions, with a written handout, were given. The patient was discharged from the [facility] Surgery Center alert and ambulatory.  Follow up for suture removal as needed.        Staff Involved:    Scribe Disclosure  I, Emilio Rodriguez, am serving as a scribe to document services personally performed by Dr. Ramu Hardy, based on data collection and the provider's statements to me.     Attending attestation:  I personally performed the entire procedure.  I have reviewed the note and edited it as  necessary, and agree with its contents.    Ramu Hardy M.D.  Professor  Director of Dermatologic Surgery  Department of Dermatology  [facility]    Dermatology Surgery Clinic  [facility]  [address] [place], [place] [id number]

## 2019-06-25 NOTE — PATIENT INSTRUCTIONS

## 2019-06-25 NOTE — NURSING NOTE
Excisional biopsy performed on the Right 4th finger. Injected 3ml of Xylocaine  (Lidocaine 1% and Epinephrine  (1:100,000))      Present for procedure:  Dr. Hardy, MD Kim Huber, LPN    Vaseline and telfa applied. Pressure dressing applied with dental rolls and tape. No bleeding noted. Denies pain.  Wound care instructions reviewed. Supplies given. All questions answered.

## 2019-06-25 NOTE — NURSING NOTE
1000 mg of extra strength acetaminophen was given to patient with a glass of water. Patient tolerated well and had no complaints.  Lot#[id number]  Expiration: 10/2019

## 2019-06-25 NOTE — NURSING NOTE
Chief Complaint   Patient presents with     Derm Problem     excisional biopsy R ring finger     Oneyda Goode, EMT

## 2019-07-03 LAB — COPATH REPORT: NORMAL

## 2019-07-05 ENCOUNTER — TELEPHONE (OUTPATIENT)
Dept: DERMATOLOGY | Facility: CLINIC | Age: 34
End: 2019-07-05

## 2019-07-05 NOTE — TELEPHONE ENCOUNTER
Pt called back regarding below.  Please contact the pt as soon as you are available.  Pt is open now and will be able to  calls.  Thanks!

## 2019-07-05 NOTE — TELEPHONE ENCOUNTER
[facility]    Phone Message    May a detailed message be left on voicemail: yes    Reason for Call: Other: Pt said she is returning a phone call to karen or fariba, she said she received a vm and to call back, no notes in pt's chart as to who called her, please call pt back and leave detailed vm - pt said she has an appt at 1130am today so she will not be able to answer    Action Taken: Message routed to:  [facility] ([facility] Derm

## 2019-07-09 ENCOUNTER — ALLIED HEALTH/NURSE VISIT (OUTPATIENT)
Dept: DERMATOLOGY | Facility: CLINIC | Age: 34
End: 2019-07-09
Payer: COMMERCIAL

## 2019-07-09 DIAGNOSIS — D48.9 NEOPLASM OF UNCERTAIN BEHAVIOR: Primary | ICD-10-CM

## 2019-07-09 ASSESSMENT — PAIN SCALES - GENERAL: PAINLEVEL: MILD PAIN (2)

## 2019-07-09 NOTE — NURSING NOTE
Chief Complaint   Patient presents with     Derm Problem     suture removal, complains of pain, but it is improvements       Oneyda Goode EMT

## 2019-07-10 NOTE — PROGRESS NOTES
Sophia De Leon comes into clinic today at the request of Dr. Hardy Ordering Provider for wound check.  This service provided today was under the supervising provider of the day Dr. Hardy, who was available if needed.    Oneyda Goode

## 2019-11-25 DIAGNOSIS — L70.0 ACNE VULGARIS: ICD-10-CM

## 2019-11-25 RX ORDER — TRIAMCINOLONE ACETONIDE 1 MG/G
CREAM TOPICAL
Qty: 80 G | Refills: 1 | OUTPATIENT
Start: 2019-11-25

## 2019-11-25 NOTE — TELEPHONE ENCOUNTER
"triamcinolone (KENALOG) 0.1 % cream      Last Written Prescription Date:  10/12/18  Last Fill Quantity: 80g,   # refills: 1  Last Office Visit : 5/10/19  Future Office visit: none    Process 4    Routing refill request to provider for review/approval because: 5/10/19  Plan 1......\" avoid triamcinolone\".. rf or refuse?  "

## 2019-11-25 NOTE — TELEPHONE ENCOUNTER
Received refill request for triamcinolone 0.1% ointment as the resident on call. Reviewed patient's chart and attached communication. Patient last seen 5/10/19 by Dr. Lerma for HS and irritant dermatitis, note from this visit states that patient should avoid triamcinolone. Refill declined. It was recommended in the note that the patient use tacrolimus ointment instead.    Lakshmi Villalba PGY2  Dermatology Resident  pager

## 2020-02-21 ENCOUNTER — OFFICE VISIT (OUTPATIENT)
Dept: DERMATOLOGY | Facility: CLINIC | Age: 35
End: 2020-02-21
Payer: COMMERCIAL

## 2020-02-21 DIAGNOSIS — F32.A DEPRESSION: Primary | ICD-10-CM

## 2020-02-21 DIAGNOSIS — L70.0 ACNE VULGARIS: ICD-10-CM

## 2020-02-21 DIAGNOSIS — L73.2 HIDRADENITIS SUPPURATIVA: ICD-10-CM

## 2020-02-21 DIAGNOSIS — D48.5 NEOPLASM OF UNCERTAIN BEHAVIOR OF SKIN: ICD-10-CM

## 2020-02-21 DIAGNOSIS — R21 RASH: ICD-10-CM

## 2020-02-21 RX ORDER — PNV NO.95/FERROUS FUM/FOLIC AC 28MG-0.8MG
TABLET ORAL
COMMUNITY
Start: 2020-02-13 | End: 2022-03-29

## 2020-02-21 RX ORDER — LIDOCAINE HYDROCHLORIDE AND EPINEPHRINE 10; 10 MG/ML; UG/ML
3 INJECTION, SOLUTION INFILTRATION; PERINEURAL ONCE
Status: ACTIVE | OUTPATIENT
Start: 2020-02-21

## 2020-02-21 ASSESSMENT — PAIN SCALES - GENERAL
PAINLEVEL: NO PAIN (0)
PAINLEVEL: NO PAIN (0)

## 2020-02-21 ASSESSMENT — PATIENT HEALTH QUESTIONNAIRE - PHQ9: SUM OF ALL RESPONSES TO PHQ QUESTIONS 1-9: 23

## 2020-02-21 NOTE — PATIENT INSTRUCTIONS

## 2020-02-21 NOTE — NURSING NOTE
"[facility]:  PHQ-9 Screening Note  SITUATION/BACKGROUND                                                    Sophia De Leon is a 34 year old female who completed the PHQ-9 assessment for depression and Score is >9.    Onset of symptoms: worsening  Trigger: HS  Recent related events: Flare of HS  Prior history of suicide attempt or self harm: No  Risk Factors: comorbid medical condition of HS  History of depression or mental illness: Yes  Medications reviewed: Yes     ASSESSMENT      A. Are any of the following present?      Suicidal thoughts with a plan and means to carry out the plan?    Intent to harm others    Altered mental status: confusion, delusional, psychotic NO - go to B   B. Are any of the following present?      Suicidal thoughts without a plan or means to carry out the plan    New onset of delusional ideas    Past inpatient admission for depression    New onset and recent change or addition of new medication NO - go to C   C. Are any of the following present?      Previous suicide attempts    Depression interfering with ability to work or function    Loss of appetite and eating poorly    Abrupt cessation of drugs (OTC or RX), alcohol or caffeine    Drug or alcohol abuse NO - go to D   D. Are several of the following present?      Difficulty concentrating    Difficulty sleeping    Reduced interest in sexual activity or impotency    Irregular or absent menstruation    No interest in activity    Change in interpersonal relationships    Increased use/abuse of alcohol or drugs    Pregnant or recent child birth    Recent major life change    History of depression YES -  Follow-up with PCP for appointment and follow home care instructions.    Place referral to behavioral health team for \"regular\" follow-up.         PLAN      Home Care Instructions:   Contact friends or family for support    Report the following to your PCP:   Depression interferes with daily activities    Seek emergency care " immediately if any of the following occur:   Suicidal thoughts and plan and means to carry out the plan    BEHAVIORAL HEALTH TEAMS      List of [place] hospitals according to the [facility] - Behavioral Health Team    [facility] Pager: [phone]    [place]  - Behavioral Health Team    Pager number: [phone]    Referral to Behavioral Health   [facility] [[id number]}    RESOURCES      - 24/7 Crisis Hotlines: National Suicide Prevention Hotline  953-021-FMCW (2609)    Neena Parson RN        Copyright 2016 [facility]

## 2020-02-21 NOTE — PROGRESS NOTES
"[facility] Dermatology Note      Dermatology Problem List:  1. Rash, Rash, forehead and L lateral cheek  - favor nodulocystic acne, but biopsy and tissue culture obtained 2/21/20  2. Hidradenitis suppurativa, Beltran stage III  -Current: Clindamycin gel, BP wash, sal acid wash, protopic 0.1% BID for areas with irritant dermatitis   -Past: oral antibiotics (minocycline and rifampin), Humira (migraines), isotretinoin (dryness, headaches and dizziness), ILK (no benefit?), spironolactone (did not start), anakinra prescribed (did not start).  3. Hemangioma vs late phase/regressed pyogenic granuloma, R palmar 4th finger   - s/p excision with Mohs 6/25/19    Encounter Date: Feb 21, 2020    CC:   Chief Complaint   Patient presents with     Derm Problem     Sophia is here for a HS follow up, states she has new concerning areas on her face.       History of Present Illness:  Ms. Sophia De Leon is a 34 year old female who presents as a follow-up for hidradenitis suppurativa. The patient was last seen 5/10/19.    Today, patient reports that in mid November 2019 she noticed 1 large pimple on the left side of her face.  About 2 weeks later she woke up with a crop of pimples all grouped together also on the left side of the face.  She was seen by her PCP who was concerned for a vesicular rash and sent a viral culture, which was canceled as it \"takes 3 to 4 days to return results.\"  Initial wound culture on 11/26/2019 and repeat wound culture on 1/9/2028 showed 1+ coagulase-negative Staphylococcus. She has been treated with topical clindamycin, intramuscular ceftriaxone 1 g once, as well as oral doxycycline for 14 days.  Given lack of improvement she was referred to infectious diseases, who repeated wound culture and prescribed Keflex 1000 mg every 6 hours for 5 days.  Most recently she was also seen by an outside dermatologist who felt that she had severe acne and treated her with a 40 mg 2-week prednisone " taper.  Also recommended spironolactone but patient has not started this yet as she is concerned about lightheadedness and dizziness, which she already experiences.  Patient feels that she has had the most improvement with the prednisone, however a group of similar-appearing spots on her forehead that cleared with the prednisone have not come back since she she has been off of it.    She also notes that her hidradenitis suppurativa continues to flare, especially around her periods.  She is only treating with topical therapies right now including a benzoyl peroxide wash for the body, benzyl peroxide cream on the face, intermittent clindamycin use.  She stopped using the metronidazole gel on her face as it was not helpful.    She denies any changes in health or medications around the time of when the lesions on her face appeared. She moves around while sleeping and touches both sides of her face.     Outside wound cultures from [facility] summarized below.  -11/26/2018: No organisms seen  -1/9/2020: 1+ Staphylococcus coagulase-negative, resistant to oxacillin, erythromycin, clindamycin, tetracycline, cefazolin    Past Medical History:   Patient Active Problem List   Diagnosis     Acne vulgaris     Hidradenitis suppurativa     Migraine with aura and without status migrainosus, not intractable     No past medical history on file.  No past surgical history on file.    Social History:  Patient reports that she has never smoked. She has never used smokeless tobacco.    Family History:  Family History   Problem Relation Age of Onset     Melanoma No family hx of      Skin Cancer No family hx of        Medications:  Current Outpatient Medications   Medication Sig Dispense Refill     Acetaminophen (TYLENOL PO) Take 500 mg by mouth PRN       benzoyl peroxide 5 % external liquid Apply to affected areas for 20 seconds daily, then rinse. 226 g 11     Calcium Carb-Cholecalciferol (CALCIUM/VITAMIN D PO)        IBUPROFEN PO PRN        Magnesium Oxide -Mg Supplement 250 MG PO tablet        anakinra (KINERET) 100 MG/0.67ML SOSY injection Inject 0.67 mLs (100 mg) Subcutaneous daily (Patient not taking: Reported on 10/12/2018) 20.1 mL 3     clindamycin (CLINDAMAX) 1 % topical gel Apply topically 2 times daily       loratadine (CLARITIN) 10 MG tablet Take 10 mg by mouth daily       metroNIDAZOLE (METROCREAM) 0.75 % cream To face daily (Patient not taking: Reported on 2/21/2020) 45 g 11     tacrolimus (PROTOPIC) 0.1 % external ointment Apply topically 2 times daily (Patient not taking: Reported on 2/21/2020) 100 g 3     triamcinolone (KENALOG) 0.1 % cream Twice daily to rash area on the abdomen as needed. Avoid face. (Patient not taking: Reported on 2/21/2020) 80 g 1        No Known Allergies    Review of Systems:  -Skin Establ Pt: The patient denies any new rash, pruritus, or lesions that are symptomatic, changing or bleeding, except as per HPI.  -Constitutional: Otherwise feeling well today, in usual state of health.  -HEENT: Patient denies nonhealing oral sores.  -Skin: As above in HPI. No additional skin concerns.    Physical exam:  Vitals: There were no vitals taken for this visit.  GEN: This is a well developed, well-nourished female in no acute distress, in a pleasant mood.    SKIN: examination of the cervical/occipital/supraclavicular lymph nodes, face, neck, chest, and groin was performed  -Shepherd skin type: IV  -L cervical mobile lymph node   -Central forehead and L lateral cheek with grouped inflammatory papules and pustules  -L lateral cheek with ice pick and boxcar scars  -Central chest in between the breasts and groin with inflammatory nodules and papules and scarred sinus tracts   -R axillae with few nodules  -L axillae clear   -Back with scattered inflammatory papules and pustules             Impression/Plan:  1. Rash, forehead and L lateral cheek   Exam today favors severe nodulocystic acne (conglobata).  However, given history  and initial concern for vesicular rash and patient was evaluated by her PCP in 11/2019, also consider bacterial superinfection herpes zoster versus HSV.  Bacterial cultures at [facility] 1+ coagulase-negative Staphylococcus.  Patient has been treated with a variety of antibiotics including topical clindamycin, ceftriaxone, doxycycline, and Keflex by providers at [facility], all without improvement.  She was also treated with a 2-week course of 40 mg prednisone taper by outside dermatologist, which seemed to be the most helpful. Was also prescribed spironolactone, but has not started this yet.  Given various courses of antibiotics and prednisone with minimal improvement, discussed with patient recommendation to obtain biopsy for H&E and tissue culture today.  Patient amenable with this plan.  - follow-up H&E and tissue culture (aerobic bacterial, fungal, AFB)  - hold benzoyl peroxide and clindamycin and salicylic acid for now   - Punch biopsy:  After discussion of benefits and risks including but not limited to bleeding/bruising, pain/swelling, infection, scar, incomplete removal, nerve damage/numbness, recurrence, and non-diagnostic biopsy, written consent, verbal consent and photographs were obtained. Time-out was performed. The area was cleaned with isopropyl alcohol. 0.5mL of 1% lidocaine with 1:100,000 epinephrine was injected to obtain adequate anesthesia of the lesion on the L mandible.  A 4 mm punch biopsy was performed. 4-0 prolene sutures were utilized to approximate the epidermal edges. White petroleum jelly/Vaseline and a bandage was applied to the wound. Explicit verbal and written wound care instructions were provided. The patient left the Dermatology Clinic in good condition. The patient was counseled to follow up for suture removal in approximately 7 days.    2. Hidradenitis suppurativa, Beltran stage III  Failed and/or did not start various treatment options including: oral antibiotics (minocycline and  rifampin), Humira (migraines), isotretinoin (dryness, headaches and dizziness), ILK (no benefit), spironolactone (did not start), anakinra prescribed (did not start).  -Continue topical therapy with benzoyl peroxide wash and clindamycin 1% lotion  -Consider systemic therapy at follow-up  -Consider follow-up with Dr. Franco    3. Cervical lymphadenopathy   Ipsilateral to what appears to facial rash.  Given significant inflammation, will monitor for now but low threshold to refer for biopsy should it not resolve over the next few weeks.  - monitor       Follow-up 1 week for suture removal and 4 weeks for next derm appointment.       Dr. Lerma staffed the patient.    Staff Involved:  Estella Milligan MD (PGY2)/Staff    I have personally examined this patient and agree with Dr. Milligan's documentation and plan of care. I have reviewed and amended the resident's note above. The documentation accurately reflects my clinical observations, diagnoses, treatment and follow-up plans. I was present for key portions of the procedure.       Omayra Lerma MD  Dermatology Staff

## 2020-02-21 NOTE — NURSING NOTE
Dermatology Rooming Note    Sophia De Leon's goals for this visit include:   Chief Complaint   Patient presents with     Derm Problem     Sophia is here for a HS follow up, states she has new concerning areas on her face.       Cherelle Stapleton LPN

## 2020-02-21 NOTE — LETTER
"2/21/2020       RE: Sophia De Leon  [address] [facility] [address]     Dear Colleague,    Thank you for referring your patient, Sophia De Leon, to the [facility] DERMATOLOGY at [facility]. Please see a copy of my visit note below.    [facility] Dermatology Note      Dermatology Problem List:  1. Rash, Rash, forehead and L lateral cheek  - favor nodulocystic acne, but biopsy and tissue culture obtained 2/21/20  2. Hidradenitis suppurativa, Beltran stage III  -Current: Clindamycin gel, BP wash, sal acid wash, protopic 0.1% BID for areas with irritant dermatitis   -Past: oral antibiotics (minocycline and rifampin), Humira (migraines), isotretinoin (dryness, headaches and dizziness), ILK (no benefit?), spironolactone (did not start), anakinra prescribed (did not start).  3. Hemangioma vs late phase/regressed pyogenic granuloma, R palmar 4th finger   - s/p excision with Mohs 6/25/19    Encounter Date: Feb 21, 2020    CC:   Chief Complaint   Patient presents with     Derm Problem     Sophia is here for a HS follow up, states she has new concerning areas on her face.       History of Present Illness:  Ms. Sophia De Leon is a 34 year old female who presents as a follow-up for hidradenitis suppurativa. The patient was last seen 5/10/19.    Today, patient reports that in mid November 2019 she noticed 1 large pimple on the left side of her face.  About 2 weeks later she woke up with a crop of pimples all grouped together also on the left side of the face.  She was seen by her PCP who was concerned for a vesicular rash and sent a viral culture, which was canceled as it \"takes 3 to 4 days to return results.\"  Initial wound culture on 11/26/2019 and repeat wound culture on 1/9/2028 showed 1+ coagulase-negative Staphylococcus. She has been treated with topical clindamycin, intramuscular ceftriaxone 1 g once, as well as oral doxycycline for 14 days.  Given lack of " improvement she was referred to infectious diseases, who repeated wound culture and prescribed Keflex 1000 mg every 6 hours for 5 days.  Most recently she was also seen by an outside dermatologist who felt that she had severe acne and treated her with a 40 mg 2-week prednisone taper.  Also recommended spironolactone but patient has not started this yet as she is concerned about lightheadedness and dizziness, which she already experiences.  Patient feels that she has had the most improvement with the prednisone, however a group of similar-appearing spots on her forehead that cleared with the prednisone have not come back since she she has been off of it.    She also notes that her hidradenitis suppurativa continues to flare, especially around her periods.  She is only treating with topical therapies right now including a benzoyl peroxide wash for the body, benzyl peroxide cream on the face, intermittent clindamycin use.  She stopped using the metronidazole gel on her face as it was not helpful.    She denies any changes in health or medications around the time of when the lesions on her face appeared. She moves around while sleeping and touches both sides of her face.     Outside wound cultures from [facility] summarized below.  -11/26/2018: No organisms seen  -1/9/2020: 1+ Staphylococcus coagulase-negative, resistant to oxacillin, erythromycin, clindamycin, tetracycline, cefazolin    Past Medical History:   Patient Active Problem List   Diagnosis     Acne vulgaris     Hidradenitis suppurativa     Migraine with aura and without status migrainosus, not intractable     No past medical history on file.  No past surgical history on file.    Social History:  Patient reports that she has never smoked. She has never used smokeless tobacco.    Family History:  Family History   Problem Relation Age of Onset     Melanoma No family hx of      Skin Cancer No family hx of        Medications:  Current Outpatient Medications    Medication Sig Dispense Refill     Acetaminophen (TYLENOL PO) Take 500 mg by mouth PRN       benzoyl peroxide 5 % external liquid Apply to affected areas for 20 seconds daily, then rinse. 226 g 11     Calcium Carb-Cholecalciferol (CALCIUM/VITAMIN D PO)        IBUPROFEN PO PRN       Magnesium Oxide -Mg Supplement 250 MG PO tablet        anakinra (KINERET) 100 MG/0.67ML SOSY injection Inject 0.67 mLs (100 mg) Subcutaneous daily (Patient not taking: Reported on 10/12/2018) 20.1 mL 3     clindamycin (CLINDAMAX) 1 % topical gel Apply topically 2 times daily       loratadine (CLARITIN) 10 MG tablet Take 10 mg by mouth daily       metroNIDAZOLE (METROCREAM) 0.75 % cream To face daily (Patient not taking: Reported on 2/21/2020) 45 g 11     tacrolimus (PROTOPIC) 0.1 % external ointment Apply topically 2 times daily (Patient not taking: Reported on 2/21/2020) 100 g 3     triamcinolone (KENALOG) 0.1 % cream Twice daily to rash area on the abdomen as needed. Avoid face. (Patient not taking: Reported on 2/21/2020) 80 g 1        No Known Allergies    Review of Systems:  -Skin Establ Pt: The patient denies any new rash, pruritus, or lesions that are symptomatic, changing or bleeding, except as per HPI.  -Constitutional: Otherwise feeling well today, in usual state of health.  -HEENT: Patient denies nonhealing oral sores.  -Skin: As above in HPI. No additional skin concerns.    Physical exam:  Vitals: There were no vitals taken for this visit.  GEN: This is a well developed, well-nourished female in no acute distress, in a pleasant mood.    SKIN: examination of the cervical/occipital/supraclavicular lymph nodes, face, neck, chest, and groin was performed  -Shepherd skin type: IV  -L cervical mobile lymph node   -Central forehead and L lateral cheek with grouped inflammatory papules and pustules  -L lateral cheek with ice pick and boxcar scars  -Central chest in between the breasts and groin with inflammatory nodules and  papules and scarred sinus tracts   -R axillae with few nodules  -L axillae clear   -Back with scattered inflammatory papules and pustules             Impression/Plan:  1. Rash, forehead and L lateral cheek   Exam today favors severe nodulocystic acne (conglobata).  However, given history and initial concern for vesicular rash and patient was evaluated by her PCP in 11/2019, also consider bacterial superinfection herpes zoster versus HSV.  Bacterial cultures at [facility] 1+ coagulase-negative Staphylococcus.  Patient has been treated with a variety of antibiotics including topical clindamycin, ceftriaxone, doxycycline, and Keflex by providers at [facility], all without improvement.  She was also treated with a 2-week course of 40 mg prednisone taper by outside dermatologist, which seemed to be the most helpful. Was also prescribed spironolactone, but has not started this yet.  Given various courses of antibiotics and prednisone with minimal improvement, discussed with patient recommendation to obtain biopsy for H&E and tissue culture today.  Patient amenable with this plan.  - follow-up H&E and tissue culture (aerobic bacterial, fungal, AFB)  - hold benzoyl peroxide and clindamycin and salicylic acid for now   - Punch biopsy:  After discussion of benefits and risks including but not limited to bleeding/bruising, pain/swelling, infection, scar, incomplete removal, nerve damage/numbness, recurrence, and non-diagnostic biopsy, written consent, verbal consent and photographs were obtained. Time-out was performed. The area was cleaned with isopropyl alcohol. 0.5mL of 1% lidocaine with 1:100,000 epinephrine was injected to obtain adequate anesthesia of the lesion on the L mandible.  A 4 mm punch biopsy was performed. 4-0 prolene sutures were utilized to approximate the epidermal edges. White petroleum jelly/Vaseline and a bandage was applied to the wound. Explicit verbal and written wound care instructions were provided. The  patient left the Dermatology Clinic in good condition. The patient was counseled to follow up for suture removal in approximately 7 days.    2. Hidradenitis suppurativa, Beltran stage III  Failed and/or did not start various treatment options including: oral antibiotics (minocycline and rifampin), Humira (migraines), isotretinoin (dryness, headaches and dizziness), ILK (no benefit), spironolactone (did not start), anakinra prescribed (did not start).  -Continue topical therapy with benzoyl peroxide wash and clindamycin 1% lotion  -Consider systemic therapy at follow-up  -Consider follow-up with Dr. Franco    3. Cervical lymphadenopathy   Ipsilateral to what appears to facial rash.  Given significant inflammation, will monitor for now but low threshold to refer for biopsy should it not resolve over the next few weeks.  - monitor       Follow-up 1 week for suture removal and 4 weeks for next derm appointment.       Dr. Lerma staffed the patient.    Staff Involved:  Estella Milligan MD (PGY2)/Staff    I have personally examined this patient and agree with Dr. Milligan's documentation and plan of care. I have reviewed and amended the resident's note above. The documentation accurately reflects my clinical observations, diagnoses, treatment and follow-up plans. I was present for key portions of the procedure.       Omayra Lerma MD  Dermatology Staff        Again, thank you for allowing me to participate in the care of your patient.      Sincerely,    Omayra Lerma MD

## 2020-02-21 NOTE — LETTER
Date:February 25, 2020      Patient was self referred, no letter generated. Do not send.        [facility] Health Information

## 2020-02-23 LAB
ACID FAST STN SPEC QL: NORMAL
ACID FAST STN SPEC QL: NORMAL
SPECIMEN SOURCE: NORMAL

## 2020-02-26 LAB
BACTERIA SPEC CULT: NO GROWTH
SPECIMEN SOURCE: NORMAL

## 2020-02-28 ENCOUNTER — ALLIED HEALTH/NURSE VISIT (OUTPATIENT)
Dept: DERMATOLOGY | Facility: CLINIC | Age: 35
End: 2020-02-28
Payer: COMMERCIAL

## 2020-02-28 ENCOUNTER — MYC MEDICAL ADVICE (OUTPATIENT)
Dept: DERMATOLOGY | Facility: CLINIC | Age: 35
End: 2020-02-28

## 2020-02-28 DIAGNOSIS — Z48.02 VISIT FOR SUTURE REMOVAL: Primary | ICD-10-CM

## 2020-02-28 NOTE — PROGRESS NOTES
Ezequielruben Haley comes into clinic today at the request of Dr. Lerma Ordering Provider for Suture Removal: Site was dry, clean and intact, site cleansed with alcohol swab and sutures were removed. Pt tolerated the procedure. Vaseline and bandage applied. Pt instructed to keep site covered for 24 hours and to call with additional questions/concerns.    This service provided today was under the supervising provider of the day Dr. Luo, who was available if needed.    Genet Marshall RN

## 2020-03-02 LAB — COPATH REPORT: NORMAL

## 2020-03-11 ENCOUNTER — HEALTH MAINTENANCE LETTER (OUTPATIENT)
Age: 35
End: 2020-03-11

## 2020-03-20 LAB
FUNGUS SPEC CULT: NORMAL
SPECIMEN SOURCE: NORMAL

## 2020-04-03 ENCOUNTER — VIRTUAL VISIT (OUTPATIENT)
Dept: DERMATOLOGY | Facility: CLINIC | Age: 35
End: 2020-04-03
Payer: COMMERCIAL

## 2020-04-03 DIAGNOSIS — L70.0 ACNE VULGARIS: Primary | ICD-10-CM

## 2020-04-03 DIAGNOSIS — B37.9 YEAST INFECTION: ICD-10-CM

## 2020-04-03 RX ORDER — SPIRONOLACTONE 50 MG/1
50 TABLET, FILM COATED ORAL DAILY
Qty: 30 TABLET | Refills: 3 | Status: SHIPPED | OUTPATIENT
Start: 2020-04-03 | End: 2020-06-04

## 2020-04-03 RX ORDER — ETONOGESTREL AND ETHINYL ESTRADIOL VAGINAL RING .015; .12 MG/D; MG/D
1 RING VAGINAL
Qty: 3 EACH | Refills: 3 | Status: SHIPPED | OUTPATIENT
Start: 2020-04-03 | End: 2022-03-29

## 2020-04-03 RX ORDER — DOXYCYCLINE 100 MG/1
100 TABLET ORAL DAILY
Qty: 30 TABLET | Refills: 1 | Status: SHIPPED | OUTPATIENT
Start: 2020-04-03 | End: 2020-06-04

## 2020-04-03 RX ORDER — FLUCONAZOLE 150 MG/1
150 TABLET ORAL ONCE
Qty: 1 TABLET | Refills: 3 | Status: SHIPPED | OUTPATIENT
Start: 2020-04-03 | End: 2020-04-03

## 2020-04-03 ASSESSMENT — PAIN SCALES - GENERAL: PAINLEVEL: MODERATE PAIN (5)

## 2020-04-03 NOTE — PROGRESS NOTES
"Sophia De Leon is a 34 year old female who is being evaluated via a billable telephone visit.      The patient has been notified of following:     \"This telephone visit will be conducted via a call between you and your physician/provider. We have found that certain health care needs can be provided without the need for a physical exam.  This service lets us provide the care you need with a short phone conversation.  If a prescription is necessary we can send it directly to your pharmacy.  If lab work is needed we can place an order for that and you can then stop by our lab to have the test done at a later time.    If during the course of the call the physician/provider feels a telephone visit is not appropriate, you will not be charged for this service.\"     [facility] and Forward ((National Emergency Concerning the CORONAVIRUS (COVID 19), preferred for return patients. )    Image quality and interpretability: acceptable    Physician has received verbal consent for a Video/Photos Visit from the patient? Yes    In-person dermatology visit recommendation: no    Consent has been obtained for this service by 1 care team member: yes.         ___________________________________________________________________________          Sophia De Leon complains of    Chief Complaint   Patient presents with     Derm Problem     Sophia states her face is worsening.         I have reviewed and updated the patient's Past Medical History, Social History, Family History and Medication List.    ALLERGIES  Patient has no known allergies.    [facility] Dermatology Note      Dermatology Problem List:  1. Rash, Rash, forehead and L lateral cheek  - favor nodulocystic acne, but biopsy and tissue culture obtained 2/21/20  2. Hidradenitis suppurativa, Beltran stage III  -Current: benzoyl peroxide 2.5% wash  -Past: oral antibiotics (minocycline and rifampin), Humira (migraines), isotretinoin (dryness, " headaches and dizziness), ILK (no benefit?), spironolactone (did not start), anakinra prescribed (did not start).  3. Late phase/regressed pyogenic granuloma, R palmar 4th finger   - s/p excision with Mohs 6/25/19    Encounter Date: Apr 3, 2020    CC:   Chief Complaint   Patient presents with     Derm Problem     Sophia states her face is worsening.         History of Present Illness:  Ms. Sophia De Leon is a 34 year old female who presents as a follow-up for hidradenitis suppurativa and nodulocystic acne. Past treatment options have been limited by side effects or concern that side effects will occur. See derm problem list.     Patient notes that the face is flaring and she is interested in more aggressive treatment. She would like to consider isotretinoin. She was not able to tolerate this previously when prescribed by an outside MD. She also has difficulty with hormonal birth control options due to migraines. She would not be able to get an IUD in the setting of COVID. She does not tolerate oral antibiotics due to vaginal yeast infections and IBS symptoms.       Past Medical History:   Patient Active Problem List   Diagnosis     Acne vulgaris     Hidradenitis suppurativa     Migraine with aura and without status migrainosus, not intractable     No past medical history on file.  No past surgical history on file.    Social History:  Patient reports that she has never smoked. She has never used smokeless tobacco.    Family History:  Family History   Problem Relation Age of Onset     Melanoma No family hx of      Skin Cancer No family hx of        Medications:  Current Outpatient Medications   Medication Sig Dispense Refill     Acetaminophen (TYLENOL PO) Take 500 mg by mouth PRN       anakinra (KINERET) 100 MG/0.67ML SOSY injection Inject 0.67 mLs (100 mg) Subcutaneous daily (Patient not taking: Reported on 10/12/2018) 20.1 mL 3     benzoyl peroxide 5 % external liquid Apply to affected areas for 20 seconds daily,  then rinse. 226 g 11     Calcium Carb-Cholecalciferol (CALCIUM/VITAMIN D PO)        clindamycin (CLINDAMAX) 1 % topical gel Apply topically 2 times daily       IBUPROFEN PO PRN       loratadine (CLARITIN) 10 MG tablet Take 10 mg by mouth daily       Magnesium Oxide -Mg Supplement 250 MG PO tablet        metroNIDAZOLE (METROCREAM) 0.75 % cream To face daily (Patient not taking: Reported on 2/21/2020) 45 g 11     tacrolimus (PROTOPIC) 0.1 % external ointment Apply topically 2 times daily (Patient not taking: Reported on 2/21/2020) 100 g 3     triamcinolone (KENALOG) 0.1 % cream Twice daily to rash area on the abdomen as needed. Avoid face. (Patient not taking: Reported on 2/21/2020) 80 g 1        No Known Allergies    Review of Systems:  -Skin Establ Pt: The patient denies any new rash, pruritus, or lesions that are symptomatic, changing or bleeding, except as per HPI.  -Constitutional: Otherwise feeling well today, in usual state of health.  -HEENT: Patient denies nonhealing oral sores.  -Skin: As above in HPI. No additional skin concerns.    Physical exam:  Vitals: There were no vitals taken for this visit.  GEN: No distress  Pulm: Talking in sentences without dyspnea  Psych: normal mood and affect  Neuro: Normal mentation and speech  SKIN: examination of the face  -Shepherd skin type: IV  -Severe inflammatory papules and pustules on the forehead, lateral cheek with atrophic macules and erythema  -Inflammatory nodule on the R mandible  -Sclerotic plaque on the L lateral cheek                        Impression/Plan:  1. Severe nodulocystic ance: Pyoderma faciale. I recommended low dose isotretinoin. Would start at 10 mg daily. Discussed 30 day waiting period. Will plan on Nuvaring and condoms as birth control options. Patient was mailed ipledge consent which she is to complete and take photo of to post to my chart. She will complete 2 home pregnancy tests 30 days or more apart. She is to call ipledge to transition  to me as her isotretinoin prescriber.   -Start doxycycline 100 mg daily and/or spironolactone 50 mg daily. Patient will think about these options and make a choice  -Start Nuvaring  -Diflucan prescribed for vaginal yeast infection that she likely will get if taking oral antibiotics    2. Hidradenitis suppurativa, Beltran stage III. Not addressed today, but would expect isotretinoin to be helpful in this regard. I have previously suggested a visit with Dr. Franco. Patient will attempt to schedule this visit.   Failed and/or did not start various treatment options including: oral antibiotics (minocycline and rifampin), Humira (migraines), isotretinoin (dryness, headaches and dizziness), ILK (no benefit), spironolactone (did not start), anakinra prescribed (did not start).  -Start dilute bleach baths. Did not tolerate Benzoyl peroxide due to dryness.     RTC via phone visit in 4-6 weeks.       Phone call duration: 22 minutes    [facility]     Image quality and interpretability: acceptable    Physician has received verbal consent for a Video/Photos Visit from the patient? Yes    In-person dermatology visit recommendation: no    _____________________________________________________________________________    Teledermatology information:  - Location of patient: Home  - Patient presented as: return  - Location of teledermatologist:  ([facility] DERMATOLOGY )  - Reason teledermatology is appropriate:  of National Emergency Regarding Coronavirus disease (COVID 19) Outbreak  - Method of transmission:  Store and Forward ((National Emergency Concerning the CORONAVIRUS (COVID 19)   - Date of images: 4/3/20  - Service start time:1042  - Service end time:11:04  - Date of report: 04/03/20

## 2020-04-03 NOTE — NURSING NOTE
Dermatology Rooming Note    Sophia De Leon's goals for this visit include:   Chief Complaint   Patient presents with     Derm Problem     Sophia states her face is worsening.         Cherelle Stapleton LPN

## 2020-04-19 LAB
MYCOBACTERIUM SPEC CULT: NORMAL
MYCOBACTERIUM SPEC CULT: NORMAL
SPECIMEN SOURCE: NORMAL

## 2020-06-04 ENCOUNTER — VIRTUAL VISIT (OUTPATIENT)
Dept: DERMATOLOGY | Facility: CLINIC | Age: 35
End: 2020-06-04
Payer: COMMERCIAL

## 2020-06-04 DIAGNOSIS — L73.2 HIDRADENITIS SUPPURATIVA: Primary | ICD-10-CM

## 2020-06-04 DIAGNOSIS — L70.0 ACNE VULGARIS: ICD-10-CM

## 2020-06-04 RX ORDER — CLOBETASOL PROPIONATE 0.5 MG/G
CREAM TOPICAL 2 TIMES DAILY
Qty: 85 G | Refills: 1 | Status: SHIPPED | OUTPATIENT
Start: 2020-06-04 | End: 2021-10-28

## 2020-06-04 RX ORDER — DOXYCYCLINE HYCLATE 100 MG/1
100 TABLET, DELAYED RELEASE ORAL 2 TIMES DAILY
Qty: 180 TABLET | Refills: 1 | Status: SHIPPED | OUTPATIENT
Start: 2020-06-04 | End: 2021-10-28

## 2020-06-04 RX ORDER — DOXYCYCLINE 100 MG/1
100 TABLET ORAL DAILY
Qty: 90 TABLET | Refills: 2 | Status: SHIPPED | OUTPATIENT
Start: 2020-06-04 | End: 2020-06-04

## 2020-06-04 RX ORDER — DOXYCYCLINE 100 MG/1
100 TABLET ORAL 2 TIMES DAILY
Qty: 90 TABLET | Refills: 2 | Status: SHIPPED | OUTPATIENT
Start: 2020-06-04 | End: 2020-06-04

## 2020-06-04 RX ORDER — RIFAMPIN 300 MG/1
600 CAPSULE ORAL DAILY
Qty: 90 CAPSULE | Refills: 0 | Status: SHIPPED | OUTPATIENT
Start: 2020-06-04 | End: 2020-08-27

## 2020-06-04 NOTE — PROGRESS NOTES
[facility]  Store and Forward and Video ( Invitation sent by:  Rosanne and text to cell phone: [phone] )      Impression and Recommendations (Patient Counseled on the Following):  1.HS/Acne   - She wants to hold off on immunosuppression for now. Wants to try hyperbaric oxygen.Also interested in considering Nd-YAG, Will refer to hyperbaric oxygen for now and see if this helps. Will plan to see her in 3 months and if not working consider stelara +/- Nd=YAG  - Continue doxycyline 100mg BID   - Continue BP      Follow-up:  8 weeks to decide on whether she would like to switch to stelara.      Staff only:    Timmy Franco MD, FAAD    Departments of Internal Medicine and Dermatology  [facility]  [phone]    _____________________________________________________________________________    Dermatology Problem List:  1. Rash, Rash, forehead and L lateral cheek  - favor nodulocystic acne, but biopsy and tissue culture obtained 2/21/20  2. Hidradenitis suppurativa, Beltran stage III  -Current: benzoyl peroxide 2.5% wash  -Past: oral antibiotics (minocycline and rifampin), Humira (migraines), isotretinoin (dryness, headaches and dizziness), ILK (no benefit?), spironolactone (did not start), anakinra prescribed (did not start).  3. Late phase/regressed pyogenic granuloma, R palmar 4th finger   - s/p excision with Mohs 6/25/19    Encounter Date: Jun 4, 2020    CC:  HS & Acne      History of Present Illness:  I have reviewed the teledermatology information and the nursing intake corresponding to this issue. Sophia De Leon is a 34 year old female who presents via teledermatology for HS/Acne     Her biggest issues have been the face and groin. Axilla clear. Lower abdomenat pant line gets flares Rt>Lt that gets worse with her menstrual cycle (before, during).     Started in early 20s. At age 24 she found out what is was from her dermatologist.    She would like to consider  isotretinoin. She was not able to tolerate this previously when prescribed by an outside MD. She also has difficulty with hormonal birth control options due to migraines. She would not be able to get an IUD in the setting of COVID. She does not tolerate oral antibiotics due to vaginal yeast infections and IBS symptoms.     She has three children. It started before pregnancy but gets worse during and has progressively gotten worse with each pregnancy.    She gets down at times. It gets very itchy at times and painful,.    Previous treatments:  -Current:          benzoyl peroxide 2.5% wash         Doxycyline 100mg BID, helps a little but a lot          Clindamycin GI upset   Keflex did help  -Past:    Doxycyline seems to help a little    Minocycline and rifampin - helped but wore off   Humira made it worse - felt fatigue, brain fog, felt worse, worsened   Migraines. Worse with the humira.              Isotretinoin (dryness, headaches and dizziness)   Prednisone helped when she was on it but got much worse when  came off   Spironolactone did not seem to help   ILK seems to help      She has abdominal cramping and constiopation off and on. No blood in the stool. No mucous.1 BM per day.    Crohns in younger cousin    She had a colonoscopy and it was neg    Past Medical History:   Patient Active Problem List   Diagnosis     Acne vulgaris     Hidradenitis suppurativa     Migraine with aura and without status migrainosus, not intractable     History reviewed. No pertinent past medical history.  History reviewed. No pertinent surgical history.    Social History:  Patient reports that she has never smoked. She has never used smokeless tobacco.    Family History:  Family History   Problem Relation Age of Onset     Melanoma No family hx of      Skin Cancer No family hx of        Medications:  Current Outpatient Medications   Medication     Acetaminophen (TYLENOL PO)     anakinra (KINERET) 100 MG/0.67ML SOSY injection      benzoyl peroxide 5 % external liquid     Calcium Carb-Cholecalciferol (CALCIUM/VITAMIN D PO)     clindamycin (CLINDAMAX) 1 % topical gel     doxycycline monohydrate (ADOXA) 100 MG tablet     etonogestrel-ethinyl estradiol (NUVARING) 0.12-0.015 MG/24HR vaginal ring     IBUPROFEN PO     loratadine (CLARITIN) 10 MG tablet     Magnesium Oxide -Mg Supplement 250 MG PO tablet     metroNIDAZOLE (METROCREAM) 0.75 % cream     spironolactone (ALDACTONE) 50 MG tablet     tacrolimus (PROTOPIC) 0.1 % external ointment     triamcinolone (KENALOG) 0.1 % cream     Current Facility-Administered Medications   Medication     lidocaine 1% with EPINEPHrine 1:100,000 injection 3 mL          No Known Allergies      _____________________________________________________________________________    Teledermatology information:  - Location of patient in [place]: Home  - Patient presented as: provider referral  - Location of teledermatologist:  ([facility] DERMATOLOGY )  - Reason teledermatology is appropriate:  of National Emergency Regarding Coronavirus disease (COVID 19) Outbreak  - Image quality and interpretability: acceptable  - Physician has received verbal consent for a Video/Photos Visit from the patient? Yes  - In-person dermatology visit recommendation: no  - Date of images: 04/03/20  - Service start time 6:20pm  - Service end time: 7:12 pm  - Date of report: 6/4/2020

## 2020-06-04 NOTE — LETTER
6/4/2020       RE: Sophia De Leon  [address] [address]     Dear Colleague,    Thank you for referring your patient, Sophia De Leon, to the [facility] DERMATOLOGY at [facility]. Please see a copy of my visit note below.    [facility]  Store and Forward and Video ( Invitation sent by:  Rosanne and text to cell phone: [phone] )      Impression and Recommendations (Patient Counseled on the Following):  1.HS/Acne   - She wants to hold off on immunosuppression for now. Wants to try hyperbaric oxygen.Also interested in considering Nd-YAG, Will refer to hyperbaric oxygen for now and see if this helps. Will plan to see her in 3 months and if not working consider stelara +/- Nd=YAG  - Continue doxycyline 100mg BID   - Continue BP      Follow-up:  8 weeks to decide on whether she would like to switch to stelara.      Staff only:    Timmy Franco MD, FAAD    Departments of Internal Medicine and Dermatology  [facility]  [phone]    _____________________________________________________________________________    Dermatology Problem List:  1. Rash, Rash, forehead and L lateral cheek  - favor nodulocystic acne, but biopsy and tissue culture obtained 2/21/20  2. Hidradenitis suppurativa, Beltran stage III  -Current: benzoyl peroxide 2.5% wash  -Past: oral antibiotics (minocycline and rifampin), Humira (migraines), isotretinoin (dryness, headaches and dizziness), ILK (no benefit?), spironolactone (did not start), anakinra prescribed (did not start).  3. Late phase/regressed pyogenic granuloma, R palmar 4th finger   - s/p excision with Mohs 6/25/19    Encounter Date: Jun 4, 2020    CC:  HS & Acne      History of Present Illness:  I have reviewed the teledermatology information and the nursing intake corresponding to this issue. Sophia De Leon is a 34 year old female who presents via teledermatology for HS/Acne     Her  biggest issues have been the face and groin. Axilla clear. Lower abdomenat pant line gets flares Rt>Lt that gets worse with her menstrual cycle (before, during).     Started in early 20s. At age 24 she found out what is was from her dermatologist.    She would like to consider isotretinoin. She was not able to tolerate this previously when prescribed by an outside MD. She also has difficulty with hormonal birth control options due to migraines. She would not be able to get an IUD in the setting of COVID. She does not tolerate oral antibiotics due to vaginal yeast infections and IBS symptoms.     She has three children. It started before pregnancy but gets worse during and has progressively gotten worse with each pregnancy.    She gets down at times. It gets very itchy at times and painful,.    Previous treatments:  -Current:          benzoyl peroxide 2.5% wash         Doxycyline 100mg BID, helps a little but a lot          Clindamycin GI upset   Keflex did help  -Past:    Doxycyline seems to help a little    Minocycline and rifampin - helped but wore off   Humira made it worse - felt fatigue, brain fog, felt worse, worsened   Migraines. Worse with the humira.              Isotretinoin (dryness, headaches and dizziness)   Prednisone helped when she was on it but got much worse when  came off   Spironolactone did not seem to help   ILK seems to help      She has abdominal cramping and constiopation off and on. No blood in the stool. No mucous.1 BM per day.    Crohns in younger cousin    She had a colonoscopy and it was neg    Past Medical History:   Patient Active Problem List   Diagnosis     Acne vulgaris     Hidradenitis suppurativa     Migraine with aura and without status migrainosus, not intractable     History reviewed. No pertinent past medical history.  History reviewed. No pertinent surgical history.    Social History:  Patient reports that she has never smoked. She has never used smokeless tobacco.    Family  History:  Family History   Problem Relation Age of Onset     Melanoma No family hx of      Skin Cancer No family hx of        Medications:  Current Outpatient Medications   Medication     Acetaminophen (TYLENOL PO)     anakinra (KINERET) 100 MG/0.67ML SOSY injection     benzoyl peroxide 5 % external liquid     Calcium Carb-Cholecalciferol (CALCIUM/VITAMIN D PO)     clindamycin (CLINDAMAX) 1 % topical gel     doxycycline monohydrate (ADOXA) 100 MG tablet     etonogestrel-ethinyl estradiol (NUVARING) 0.12-0.015 MG/24HR vaginal ring     IBUPROFEN PO     loratadine (CLARITIN) 10 MG tablet     Magnesium Oxide -Mg Supplement 250 MG PO tablet     metroNIDAZOLE (METROCREAM) 0.75 % cream     spironolactone (ALDACTONE) 50 MG tablet     tacrolimus (PROTOPIC) 0.1 % external ointment     triamcinolone (KENALOG) 0.1 % cream     Current Facility-Administered Medications   Medication     lidocaine 1% with EPINEPHrine 1:100,000 injection 3 mL          No Known Allergies      _____________________________________________________________________________    Teledermatology information:  - Location of patient in [place]: Home  - Patient presented as: provider referral  - Location of teledermatologist:  ([facility] DERMATOLOGY )  - Reason teledermatology is appropriate:  of National Emergency Regarding Coronavirus disease (COVID 19) Outbreak  - Image quality and interpretability: acceptable  - Physician has received verbal consent for a Video/Photos Visit from the patient? Yes  - In-person dermatology visit recommendation: no  - Date of images: 04/03/20  - Service start time 6:20pm  - Service end time: 7:12 pm  - Date of report: 6/4/2020     Again, thank you for allowing me to participate in the care of your patient.      Sincerely,    Timmy Franco MD

## 2020-06-04 NOTE — NURSING NOTE
Chief Complaint   Patient presents with     [facility] Problem     [facility]     Oneyda Goode, EMT

## 2020-06-04 NOTE — PATIENT INSTRUCTIONS
[facility] Teledermatology Visit    Continue doxycyline 100mg twice a day  Hyperbaric oxygen referral [facility]   Consider stelara +- laser hair removal    When should I call my doctor?    If you are worsening or not improving, please, contact us or seek urgent care as noted below.     Who should I call with questions (adults)?    [facility] (adult and pediatric): [phone]     [facility] (adult): [phone]    For urgent needs outside of business hours call the [facility] at [phone] and ask for the dermatology resident on call    If this is a medical emergency and you are unable to reach an ER, Call 911      Who should I call with questions (pediatric)?  [facility]- Pediatric Dermatology  Dr. Diana Huston, Dr. Mahendra Dela Cruz, Dr. Omayra Lerma, Maxine Carter, PA  Dr. Angelique Almonte, Dr. Brenda Kovacs & Dr. Henri Underwood  Non Urgent  Nurse Triage Line; [phone]- Angelica and Jannie GANNON Care Coordinators   [place] (/Complex ) [phone]    If you need a prescription refill, please contact your pharmacy. Refills are approved or denied by our Physicians during normal business hours, Monday through Fridays  Per office policy, refills will not be granted if you have not been seen within the past year (or sooner depending on your child's condition)    Scheduling Information:  Pediatric Appointment Scheduling and Call Center [phone]  Radiology Scheduling- [phone]  Sedation Unit Scheduling- [phone]  [place] Scheduling- General [phone]; Pediatric Dermatology [phone]  Main  Services: [phone]  Yoruba: [phone]  Indian: [phone]  Hmong/Papua [place] Guinean/Wil: [phone]  Preadmission Nursing Department Fax Number: [phone] (Fax all pre-operative paperwork to this number)    For urgent matters arising during evenings, weekends,  or holidays that cannot wait for normal business hours please call [phone] and ask for the Dermatology Resident On-Call to be paged.

## 2020-06-11 ENCOUNTER — TELEPHONE (OUTPATIENT)
Dept: DERMATOLOGY | Facility: CLINIC | Age: 35
End: 2020-06-11

## 2020-06-11 NOTE — TELEPHONE ENCOUNTER
----- Message from Timmy Franco MD sent at 6/4/2020  8:43 PM CDT -----  Regarding: [facility] hyperbaric oxygen referral  I placed an [facility] hyperbaric oxygen referral. Can you help coordinate.  Thanks,  Timmy

## 2020-06-11 NOTE — TELEPHONE ENCOUNTER
Referral faxed to [facility]. Their info is below:    Phone number: [phone]   Fax number: [phone]         Cherelle Stapleton LPN

## 2020-07-30 ENCOUNTER — TELEPHONE (OUTPATIENT)
Dept: DERMATOLOGY | Facility: CLINIC | Age: 35
End: 2020-07-30

## 2020-07-30 ENCOUNTER — VIRTUAL VISIT (OUTPATIENT)
Dept: DERMATOLOGY | Facility: CLINIC | Age: 35
End: 2020-07-30
Payer: COMMERCIAL

## 2020-07-30 DIAGNOSIS — L73.2 HIDRADENITIS SUPPURATIVA: Primary | ICD-10-CM

## 2020-07-30 DIAGNOSIS — L84 CORN: ICD-10-CM

## 2020-07-30 DIAGNOSIS — K20.90 ESOPHAGITIS: ICD-10-CM

## 2020-07-30 DIAGNOSIS — B00.1 HERPES LABIALIS: ICD-10-CM

## 2020-07-30 RX ORDER — MINOCYCLINE HYDROCHLORIDE 50 MG/1
50 CAPSULE ORAL 2 TIMES DAILY
Qty: 180 CAPSULE | Refills: 1 | Status: SHIPPED | OUTPATIENT
Start: 2020-07-30 | End: 2020-07-30

## 2020-07-30 RX ORDER — UREA 40 %
CREAM (GRAM) TOPICAL
Qty: 1 TUBE | Refills: 3 | Status: SHIPPED | OUTPATIENT
Start: 2020-07-30 | End: 2022-03-29

## 2020-07-30 RX ORDER — SUCRALFATE ORAL 1 G/10ML
1 SUSPENSION ORAL 2 TIMES DAILY
Qty: 420 ML | Refills: 3 | Status: SHIPPED | OUTPATIENT
Start: 2020-07-30 | End: 2020-07-30

## 2020-07-30 RX ORDER — DOXYCYCLINE HYCLATE 100 MG/1
100 TABLET, DELAYED RELEASE ORAL 2 TIMES DAILY
Qty: 180 TABLET | Refills: 3 | Status: SHIPPED | OUTPATIENT
Start: 2020-07-30 | End: 2020-07-30

## 2020-07-30 RX ORDER — LIDOCAINE HYDROCHLORIDE 20 MG/ML
15 SOLUTION OROPHARYNGEAL EVERY 6 HOURS PRN
Qty: 100 ML | Refills: 11 | Status: SHIPPED | OUTPATIENT
Start: 2020-07-30 | End: 2020-07-30

## 2020-07-30 RX ORDER — VALACYCLOVIR HYDROCHLORIDE 1 G/1
TABLET, FILM COATED ORAL
Qty: 12 TABLET | Refills: 3 | Status: SHIPPED | OUTPATIENT
Start: 2020-07-30 | End: 2020-07-30

## 2020-07-30 RX ORDER — MINOCYCLINE HYDROCHLORIDE 100 MG/1
100 CAPSULE ORAL 2 TIMES DAILY
Qty: 180 CAPSULE | Refills: 3 | Status: SHIPPED | OUTPATIENT
Start: 2020-07-30 | End: 2020-12-31

## 2020-07-30 RX ORDER — VALACYCLOVIR HYDROCHLORIDE 1 G/1
TABLET, FILM COATED ORAL
Qty: 12 TABLET | Refills: 3 | Status: SHIPPED | OUTPATIENT
Start: 2020-07-30 | End: 2020-12-31

## 2020-07-30 RX ORDER — CLOBETASOL PROPIONATE 0.5 MG/G
OINTMENT TOPICAL
Qty: 60 G | Refills: 1 | Status: SHIPPED | OUTPATIENT
Start: 2020-07-30 | End: 2021-11-03

## 2020-07-30 ASSESSMENT — PATIENT HEALTH QUESTIONNAIRE - PHQ9: SUM OF ALL RESPONSES TO PHQ QUESTIONS 1-9: 1

## 2020-07-30 NOTE — TELEPHONE ENCOUNTER
Called to check pt in for appointment if available sooner. Left message with number to call back if needed, but will call back again.

## 2020-07-30 NOTE — PROGRESS NOTES
[facility]  Store and Forward and Video    Impression and Recommendations (Patient Counseled on the Following):  1.HS/Acne   - She wants to hold off on immunosuppression for now still. Wants to try hyperbaric oxygen.Also interested in considering Nd-YAG, Will re-refer to hyperbaric oxygen for now and see if this helps. Will plan to see her in 3 months and if not working consider stelara +/- Nd-YAG  - Continue doxycyline 100mg BID   - Continue BP     Follow-up:  8 weeks to decide on whether she would like to switch to stelara.      Staff only:     Timmy Franco MD, FAAD    Departments of Internal Medicine and Dermatology  [facility]  [phone]     _____________________________________________________________________________     Dermatology Problem List:  1. Rash, Rash, forehead and L lateral cheek  - favor nodulocystic acne, but biopsy and tissue culture obtained 2/21/20  2. Hidradenitis suppurativa, Beltran stage III  -Current: benzoyl peroxide 2.5% wash  -Past: oral antibiotics (minocycline and rifampin), Humira (migraines), isotretinoin (dryness, headaches and dizziness), ILK (no benefit?), spironolactone (did not start), anakinra prescribed (did not start).  3. Late phase/regressed pyogenic granuloma, R palmar 4th finger   - s/p excision with Mohs 6/25/19   4. Hx of Afib after childbirth     Encounter Date: July 30, 2020  LV Jun 4, 2020     CC:  HS & Acne    Interval hx: 7/30/20  Since the last time I saw Sophia, things have been worse. Her acne/HS worse. I have referred her to [facility] but she did not hear back about hyperbaric. In terms of her HS/acne, doxycyline does not seem to be helpiong for SH, but maybe keeping acne at bay. She developed pretty severe sore throat/swallowing pain.  She has also has had chest pain that lasts a few minutes and with moving. It starts and moving makes it painful. No with exertion  . Sharp pain and then dull and fade. She  does occasionallty get SOb, but does does not relate to chest pain.When she gets the pain its reproducible with palpation. No sweating.  Yesterday got vertigo. She does occasionally get vertigo. She has medicine for migraines. She has BPPV. She has medicine for that. She was referred to physical therapy for it. She does intermittently get palpations. She has been worked up for, with monitor, but she was not able to tell her what it was. He does occasionally get cold sores. It happens every few months./     History of Present Illness:  I have reviewed the teledermatology information and the nursing intake corresponding to this issue. Sophia De Leon is a 34 year old female who presents via teledermatology for HS/Acne      Her biggest issues have been the face and groin. Axilla clear. Lower abdomen at pant line gets flares Rt>Lt that gets worse with her menstrual cycle (before, during).      Started in early 20s. At age 24 she found out what is was from her dermatologist.     She would like to consider isotretinoin. She was not able to tolerate this previously when prescribed by an outside MD. She also has difficulty with hormonal birth control options due to migraines. She would not be able to get an IUD in the setting of COVID. She does not tolerate oral antibiotics due to vaginal yeast infections and IBS symptoms.      She has three children. It started before pregnancy but gets worse during and has progressively gotten worse with each pregnancy.     She gets down at times. It gets very itchy at times and painful,.     Previous treatments:  -Current:          benzoyl peroxide 2.5% wash         Doxycyline 100mg BID, helps a little but a lot          Clindamycin GI upset              Keflex did help  -Past:               Doxycyline seems to help a little               Minocycline and rifampin - helped but wore off              Humira made it worse - felt fatigue, brain fog, felt worse, worsened                         Migraines. Worse with the humira.              Isotretinoin (dryness, headaches and dizziness)              Prednisone helped when she was on it but got much worse when   came off              Spironolactone did not seem to help              ILK seems to help                            She has abdominal cramping and constiopation off and on. No blood in the stool. No mucous.1 BM per day.     Crohns in younger cousin     She had a colonoscopy and it was neg     Past Medical History:       Patient Active Problem List   Diagnosis     Acne vulgaris     Hidradenitis suppurativa     Migraine with aura and without status migrainosus, not intractable     Past Medical History   History reviewed. No pertinent past medical history.     Past Surgical History   History reviewed. No pertinent surgical history.        Social History:  Patient reports that she has never smoked. She has never used smokeless tobacco.     Family History:  Family History         Family History   Problem Relation Age of Onset     Melanoma No family hx of       Skin Cancer No family hx of             Medications:  Current Outpatient Medications   Medication     Acetaminophen (TYLENOL PO)     benzoyl peroxide 5 % external liquid     Calcium Carb-Cholecalciferol (CALCIUM/VITAMIN D PO)     clindamycin (CLINDAMAX) 1 % topical gel     clobetasol (TEMOVATE) 0.05 % external cream     Doxycycline Hyclate 100 MG TBEC     etonogestrel-ethinyl estradiol (NUVARING) 0.12-0.015 MG/24HR vaginal ring     IBUPROFEN PO     loratadine (CLARITIN) 10 MG tablet     Magnesium Oxide -Mg Supplement 250 MG PO tablet     minocycline (MINOCIN) 100 MG capsule     rifampin (RIFADIN) 300 MG capsule     tacrolimus (PROTOPIC) 0.1 % external ointment     metroNIDAZOLE (METROCREAM) 0.75 % cream     triamcinolone (KENALOG) 0.1 % cream     Current Facility-Administered Medications   Medication     lidocaine 1% with EPINEPHrine 1:100,000 injection 3 mL        No Known  Allergies       _________________________________________________________________________     Teledermatology information:  - Location of patient in [place]: Home  - Patient presented as: provider referral  - Location of teledermatologist:  [facility] DERMATOLOGY )  - Reason teledermatology is appropriate:  of National Emergency Regarding Coronavirus disease (COVID 19) Outbreak  - Image quality and interpretability: acceptable  - Physician has received verbal consent for a Video/Photos Visit from the patient? Yes  - In-person dermatology visit recommendation: no  - Date of images: none  - Service start time 5:30pm  - Service end time: 5:53pm  - Date of report: July 30, 2020

## 2020-07-30 NOTE — PROGRESS NOTES
Chief Complaint   Patient presents with     Derm Problem     HS follow up, flaring. Interested in hair removal      Oneyda Goode, EMT

## 2020-07-30 NOTE — PATIENT INSTRUCTIONS
[facility] Dermatology Visit  Switch doxycyline 10mg twice a day to minocycline   Referral placed for hypbaric oxygen at [facility]  For cold sore: Valacyclovir 2g twice a day for 1 day and clobetasol ointment until healed  Rash in groin: Clobetasol ointment twice a day as needed for itching  Corn: Urea cream twice a day, and if not better will remove in clini    When should I call my doctor?    If you are worsening or not improving, please, contact us or seek urgent care as noted below.     Who should I call with questions (adults)?    [facility] (adult and pediatric): [phone]     [facility] (adult): [phone]    For urgent needs outside of business hours call the [facility] at [phone] and ask for the dermatology resident on call    If this is a medical emergency and you are unable to reach an ER, Call 911      Who should I call with questions (pediatric)?  [facility]- Pediatric Dermatology  Dr. Diana Huston, Dr. Mahendra Dela Cruz, Dr. Omayra Lerma, Maxine Carter, PA  Dr. Angelique Almonte, Dr. Brenda Kovacs & Dr. Henri Underwood  Non Urgent  Nurse Triage Line; [phone]- Angelica and Jannie GANNON Care Coordinators   [place] (/Complex ) [phone]    If you need a prescription refill, please contact your pharmacy. Refills are approved or denied by our Physicians during normal business hours, Monday through Fridays  Per office policy, refills will not be granted if you have not been seen within the past year (or sooner depending on your child's condition)    Scheduling Information:  Pediatric Appointment Scheduling and Call Center [phone]  Radiology Scheduling- [phone]  Sedation Unit Scheduling- [phone]  [place] Scheduling- General [phone]; Pediatric Dermatology [phone]  Main  Services: [phone]  Ivorian:  [phone]  Haitian: [phone]  Go/Greenlandic/Wil: [phone]  Preadmission Nursing Department Fax Number: [phone] (Fax all pre-operative paperwork to this number)    For urgent matters arising during evenings, weekends, or holidays that cannot wait for normal business hours please call [phone] and ask for the Dermatology Resident On-Call to be paged.

## 2020-08-18 ENCOUNTER — TELEPHONE (OUTPATIENT)
Dept: DERMATOLOGY | Facility: CLINIC | Age: 35
End: 2020-08-18

## 2020-08-18 NOTE — TELEPHONE ENCOUNTER
Central Prior Authorization Team   Phone: [phone]      PA Initiation via fax    Medication: Urea 40% cream  Insurance Company: [employer] - Phone [phone] Fax [phone]  Pharmacy Filling the Rx: [id number] - [place] - [address]  Filling Pharmacy Phone: [phone]  Filling Pharmacy Fax:    Start Date: 8/18/2020

## 2020-08-18 NOTE — TELEPHONE ENCOUNTER
Prior Authorization Retail Medication Request    Medication/Dose: Urea 40% Cream   ICD code (if different than what is on RX):    Previously Tried and Failed:    Rationale:  Apply to corn twice a day     Insurance Name:  [facility]    Insurance ID:  [id number]       Pharmacy Information (if different than what is on RX)  Name:  [facility]  Phone:  [phone]

## 2020-08-20 NOTE — TELEPHONE ENCOUNTER
PRIOR AUTHORIZATION DENIED    Medication: Urea 40% cream- DENIED    Denial Date: 8/19/2020    Denial Rational: Drug is excluded from plan.        Appeal Information:

## 2020-09-01 NOTE — TELEPHONE ENCOUNTER
[place] [facility] and [place] number given to patient.    Cherelle Stapleton, BLESSINGN

## 2020-10-08 ENCOUNTER — TELEPHONE (OUTPATIENT)
Dept: DERMATOLOGY | Facility: CLINIC | Age: 35
End: 2020-10-08

## 2020-10-08 NOTE — LETTER
"October 14, 2020        To Whom This May Concern,     We are writing to request coverage of long-pulsed 1064-nm Nd: YAG laser treatment for severe hidradenitis suppartiva. This patient has symptoms of including inflammatory nodules, abscesses and draining tunnels groin and axilla..     Hidradenitis responds well to laser hair removal as it is a follicular process. We would expect 1 treatments every 3- 6 weeks for a maximum of 10 treatments.     The CPT Code Description utilized would be 08485: Unlisted procedure, skin, mucous membrane and subcutaneous tissue.   The approximate cost is $ per treatment depending on sites treated.     The only FDA covered treatment for this condition is weekly humira. In addition to being more invasive, the cost of this intervention is much larger.         Please, see the following references:   Giselle Marin et al. \"Randomized control trial for the treatment of hidradenitis suppurativa with a neodymium-doped yttrium aluminium garnet laser.\" Dermatologic surgery 35.8 (2009): 4451-0024.       Haven Ruelas, et al. \"Histopathologic study of hidradenitis suppurativa following long-pulsed 1064-nm Nd: YAG laser treatment.\" Archives of dermatology 147.1 (2011): 21-28.     Sage Pritchett., et al. \"Prospective controlled clinical and histopathologic study of hidradenitis suppurativa treated with the long-pulsed neodymium: yttrium-aluminium-garnet laser.\" Journal of the American Academy of Dermatology 62.4 (2010): 637-645.     We strive to provide our patient with outstanding care. Therefore, I request you please contact me at [phone] if you have any questions regarding coverage or our treatment rational.     Timmy Franco MD, FAAD, FACP      Department of Dermatology   [facility]   Phone([facility]): [phone]       "

## 2020-10-08 NOTE — LETTER
"October 14, 2020      Sophia De Leon  [address] [address]    To Whom This May Concern,     We are writing to request coverage of long-pulsed 1064-nm Nd: YAG laser treatment for severe hidradenitis suppartiva. This patient has symptoms of including inflammatory nodules, abscesses and draining tunnels groin and axilla..     Hidradenitis responds well to laser hair removal as it is a follicular process. We would expect 1 treatments every 3- 6 weeks for a maximum of 10 treatments.     The CPT Code Description utilized would be 64557: Unlisted procedure, skin, mucous membrane and subcutaneous tissue.   The approximate cost is $400 per treatment depending on sites treated.     The only FDA covered treatment for this condition is weekly humira. In addition to being more invasive, the cost of this intervention is much larger.         Please, see the following references:   Giselle Marin et al. \"Randomized control trial for the treatment of hidradenitis suppurativa with a neodymium-doped yttrium aluminium garnet laser.\" Dermatologic surgery 35.8 (2009): 3013-3835.       Haven Ruelas, et al. \"Histopathologic study of hidradenitis suppurativa following long-pulsed 1064-nm Nd: YAG laser treatment.\" Archives of dermatology 147.1 (2011): 21-28.     Sage Pritchett., et al. \"Prospective controlled clinical and histopathologic study of hidradenitis suppurativa treated with the long-pulsed neodymium: yttrium-aluminium-garnet laser.\" Journal of the American Academy of Dermatology 62.4 (2010): 637-645.     We strive to provide our patient with outstanding care. Therefore, I request you please contact me at [phone] if you have any questions regarding coverage or our treatment rational.     Timmy Franco MD, FAAD, FACP      Department of Dermatology   [facility]   Phone([facility]): [phone]     "

## 2020-10-08 NOTE — TELEPHONE ENCOUNTER
[facility] Call Center    Phone Message    May a detailed message be left on voicemail: yes     Reason for Call: Other: Pt would like the Doctor to consider applying for a PA for laser hair removal as a part of this Pt treatment for HS.  Please submit PA to insurance.      For questions or to provide updates please contact [facility] at [phone]     Action Taken: Message routed to:  [facility] dermatology    Travel Screening: Not Applicable

## 2020-10-12 NOTE — TELEPHONE ENCOUNTER
"Dr. Franco, fill in the Xs below and route to the  derm pool copied below. Remember these cases are not yet running but we could see if they are covered.     To Whom This May Concern,     We are writing to request coverage of long-pulsed 1064-nm Nd: YAG laser treatment for XXXsevere hidradenitis suppartiva. This patient has symptoms of including intermittent folliculitis and abscesses XXX groin and axilla..     Hidradenitis responds well to laser hair removal as it is a follicular process. We would expect 1 treatments every 3- 6 weeks for a maximum of10 treatments.     The CPT Code Description utilized would be 34489: Unlisted procedure, skin, mucous membrane and subcutaneous tissue.   The approximate cost is $ per treatment depending on sites treated.   XXX is currently shaving, which causes irritation and may be playing a role in new lesions. The only FDA covered treatment for this condition is weekly humira. In addition to being more invasive, the cost of this intervention is much larger.     Please, see the following references:  Giselle Marin et al. \"Randomized control trial for the treatment of hidradenitis suppurativa with a neodymium?doped yttrium aluminium garnet laser.\" Dermatologic surgery 35.8 (2009): 2322-5805.    Haven Ruelas, et al. \"Histopathologic study of hidradenitis suppurativa following long-pulsed 1064-nm Nd: YAG laser treatment.\" Archives of dermatology 147.1 (2011): 21-28.  Sage Pritchett, et al. \"Prospective controlled clinical and histopathologic study of hidradenitis suppurativa treated with the long-pulsed neodymium: yttrium-aluminium-garnet laser.\" Journal of the American Academy of Dermatology 62.4 (2010): 637-645.      We strive to provide our patient with outstanding care. Therefore, I request you please contact me at [phone] if you have any questions regarding coverage or our treatment rational.     MD FALGUNI      Department of Dermatology "   [facility]   Phone([facility]): [phone]

## 2020-10-28 NOTE — TELEPHONE ENCOUNTER
Writer JUAN with Adilene to call back.     No PA Required for CPT code 95306- coverage is based on medical necessity and plan benefits.

## 2020-10-28 NOTE — TELEPHONE ENCOUNTER
Adilene Ford, pt's care coordinator, is asking the status on the PA for the pt. Please call Adilene Mancini to discuss at [phone]. Thanks.

## 2020-10-29 ENCOUNTER — OFFICE VISIT (OUTPATIENT)
Dept: DERMATOLOGY | Facility: CLINIC | Age: 35
End: 2020-10-29
Payer: COMMERCIAL

## 2020-10-29 DIAGNOSIS — L73.2 HIDRADENITIS SUPPURATIVA: Primary | ICD-10-CM

## 2020-10-29 PROCEDURE — 99214 OFFICE O/P EST MOD 30 MIN: CPT | Performed by: DERMATOLOGY

## 2020-10-29 RX ORDER — HEPARIN SODIUM,PORCINE 10 UNIT/ML
5 VIAL (ML) INTRAVENOUS
Status: CANCELLED | OUTPATIENT
Start: 2020-10-30

## 2020-10-29 RX ORDER — HEPARIN SODIUM (PORCINE) LOCK FLUSH IV SOLN 100 UNIT/ML 100 UNIT/ML
5 SOLUTION INTRAVENOUS
Status: CANCELLED | OUTPATIENT
Start: 2020-10-30

## 2020-10-29 RX ORDER — USTEKINUMAB 90 MG/ML
INJECTION, SOLUTION SUBCUTANEOUS
Qty: 1 ML | Refills: 3 | OUTPATIENT
Start: 2020-10-29 | End: 2020-11-18

## 2020-10-29 RX ORDER — CLOBETASOL PROPIONATE 0.5 MG/G
CREAM TOPICAL 2 TIMES DAILY
Qty: 45 G | Refills: 0 | Status: SHIPPED | OUTPATIENT
Start: 2020-10-29 | End: 2021-10-28

## 2020-10-29 ASSESSMENT — PAIN SCALES - GENERAL: PAINLEVEL: EXTREME PAIN (8)

## 2020-10-29 NOTE — NURSING NOTE
Drug Administration Record    Prior to injection, verified patient identity using patient's name and date of birth.  Due to injection administration, patient instructed to remain in clinic for 15 minutes  afterwards, and to report any adverse reaction to me immediately.    Drug Name: triamcinolone acetonide(kenalog)  Dose: 1mL of triamcinolone 5mg/mL, 5mg dose  Route administered: ID  NDC #: Kenalog-10 (1569-9088-12)  Amount of waste(mL):4.5  Reason for waste: Multi dose vial    LOT #: [id number]  SITE: body  : [employer]  EXPIRATION DATE: March 2022

## 2020-10-29 NOTE — TELEPHONE ENCOUNTER
Writer received a VM from Adilene Ford, writer returned Adilene Ford's call and discussed prior authorization status for [id number].     Per Divya Judd advised her that a prior authorization is required for laser hair removal if being performed for any reason other than gender transformation.     Writer submitted a prior authorization to [facility] via fax. Please reach out to Adilene Ford at [phone] once response is received from [facility].    Pending Review.

## 2020-10-29 NOTE — PROGRESS NOTES
Dermatology Problem List:  1.Nodulocystic acne  2. Hidradenitis suppurativa, Beltran stage III  -Current: benzoyl peroxide 2.5% wash  -Past: oral antibiotics (minocycline and rifampin), Humira (migraines), isotretinoin (dryness, headaches and dizziness), ILK (no benefit?), spironolactone (did not start), anakinra prescribed (did not start).  3. Late phase/regressed pyogenic granuloma, R palmar 4th finger   - s/p excision with Mohs 6/25/19   4. Hx of Afib after childbirth      Encounter Date: October 29, 2020  LV:July 30, 2020     CC:  HS & Acne     Interval Hx October 29, 2020  - Not doing well. Only on minocycline.  It can be 10/10 pain. She is only on ibupreofen and tylenol.     HS Nurse Assessment    Nurse Assessment Data 10/29/2020   Number of new boils in the past month? 3   On average, over the past 7 days, how many dressing changes do you make daily? 2   From 0-10, how severe has your HS been over the past week (0 = no disease, 10 = worst it could be)? 8   Severity of pain of the most symptomatic lesion in the course of daily activities (eg: sitting, moving or working) over the past week? 10 - Worst Pain             Interval hx: 7/30/20  Since the last time I saw Sophia, things have been worse. Her acne/HS worse. [place] [facility] [place] hear back about hyper[place]ic. In terms of her HS/acne, doxycyline does not seem to be helpiong for SH, but maybe keeping acne at bay. She developed pretty severe sore throat/swallowing pain.  She has also has had chest pain that lasts a few minutes and with moving. It starts and moving makes it painful. No with exertion  . Sharp pain and then dull and fade. She does occasionallty get SOb, but does does not relate to chest pain.When she gets the pain its reproducible with palpation. No sweating.  Yesterday got vertigo. She does occasionally get vertigo. She has medicine for migraines. She has BPPV. She has medicine for that. She was referred to physical  therapy for it. She does intermittently get palpations. She has been worked up for, with monitor, but she was not able to tell her what it was. He does occasionally get cold sores. It happens every few months.  Tested for celiac and was negative.     History of Present Illness:  I have reviewed the teledermatology information and the nursing intake corresponding to this issue. Sophia De Leon is a 34 year old female who presents via teledermatology for HS/Acne      Her biggest issues have been the face and groin. Axilla clear. Lower abdomen at pant line gets flares Rt>Lt that gets worse with her menstrual cycle (before, during).      Started in early 20s. At age 24 she found out what is was from her dermatologist.     She would like to consider isotretinoin. She was not able to tolerate this previously when prescribed by an outside MD. She also has difficulty with hormonal birth control options due to migraines. She would not be able to get an IUD in the setting of COVID. She does not tolerate oral antibiotics due to vaginal yeast infections and IBS symptoms.      She has three children. It started before pregnancy but gets worse during and has progressively gotten worse with each pregnancy.     She gets down at times. It gets very itchy at times and painful,.     Previous treatments:  -Current:          benzoyl peroxide 2.5% wash         Doxycyline 100mg BID, helps a little but a lot          Clindamycin GI upset              Keflex did help  -Past:               Doxycyline seems to help a little               Minocycline and rifampin - helped but wore off              Humira made it worse - felt fatigue, brain fog, felt worse, worsened                        Migraines. Worse with the humira.              Isotretinoin (dryness, headaches and dizziness)              Prednisone helped when she was on it but got much worse when   came off              Spironolactone did not seem to help              ILK seems to  help                            She has abdominal cramping and constiopation off and on. No blood in the stool. No mucous.1 BM per day.     Crohns in younger cousin     She had a colonoscopy and it was neg     Past Medical History:         Patient Active Problem List   Diagnosis     Acne vulgaris     Hidradenitis suppurativa     Migraine with aura and without status migrainosus, not intractable      Past Medical History   History reviewed. No pertinent past medical history.      Past Surgical History   History reviewed. No pertinent surgical history.         Social History:  Patient reports that she has never smoked. She has never used smokeless tobacco.     Family History:  Family History             Family History   Problem Relation Age of Onset     Melanoma No family hx of       Skin Cancer No family hx of         Cousin with inflammatory bowel on remicaide and got cancer      Medications:  Current Outpatient Medications   Medication     Acetaminophen (TYLENOL PO)     benzoyl peroxide 5 % external liquid     Calcium Carb-Cholecalciferol (CALCIUM/VITAMIN D PO)     clindamycin (CLINDAMAX) 1 % topical gel     clobetasol (TEMOVATE) 0.05 % external cream     clobetasol (TEMOVATE) 0.05 % external ointment     IBUPROFEN PO     loratadine (CLARITIN) 10 MG tablet     Magnesium Oxide -Mg Supplement 250 MG PO tablet     minocycline (MINOCIN) 100 MG capsule     Doxycycline Hyclate 100 MG TBEC     etonogestrel-ethinyl estradiol (NUVARING) 0.12-0.015 MG/24HR vaginal ring     metroNIDAZOLE (METROCREAM) 0.75 % cream     tacrolimus (PROTOPIC) 0.1 % external ointment     triamcinolone (KENALOG) 0.1 % cream     Urea 40 % CREA     valACYclovir (VALTREX) 1000 mg tablet     Current Facility-Administered Medications   Medication     lidocaine 1% with EPINEPHrine 1:100,000 injection 3 mL          Current Facility-Administered Medications   Medication     lidocaine 1% with EPINEPHrine 1:100,000 injection 3 mL         No Known  Allergies    Exam:  - Verrucous papule on the left heel   - Acneiform, inflammatory nodules on the forehead and cheeks   - Ice-pick scarring and scatter pustules   HS Exam  Head/Neck 10/29/2020   # of inflamed nodules 0   # of abcesses 0   # of draining tunnels 1   Erythema (grade) 1   Thickness (grade) 1   Open skin surface (grade) 0   Tunnels (grade) 1       No flowsheet data found.    No flowsheet data found.    Chest 10/29/2020   # of inflamed nodules 1   # of abcesses 0   # of draining tunnels 0   Erythema (grade) 1   Thickness (grade) 1   Open skin surface (grade) 0   Tunnels (grade) 0   Chest Comments significant scarring - not active       No flowsheet data found.     Pubis/Genital 10/29/2020   # of inflamed nodules 2   # of abcesses 0   # of draining tunnels 1   Erythema (grade) 2   Thickness (grade) 2   Open skin surface (grade) 0   Tunnels (grade) 1       No flowsheet data found.    No flowsheet data found.    No flowsheet data found.    No flowsheet data found.    HS Data  HS Exam Data 10/29/2020   LC Type LC1   Clinical Subtypes Regular type   Visual analogue score (0-100) 45   Total Beltran Stage II   Total Inflammatory Nodules 3   Total Abcesses 0   Total Draining Tunnels 2   Total Abscess and Nodule Count 3   IHS4 Score  11   Total  HASI Score 20   HS-PGA 4       A/P  1.HS/Acne   -  Will plan to start ustekinumab infusion followed by 90mg every 4 week subQ  - Pain management referral  -  Ordered telfa  - Clobetasol cream BID for pruritic      RTC in 4 weeks for phone visit to make sure she got started on treatment    Timmy Franco MD, FAAD    Departments of Internal Medicine and Dermatology  [facility]  [phone]

## 2020-10-29 NOTE — LETTER
10/29/2020       RE: Sophia De Leon  [address]  [place] [address]     Dear Colleague,    Thank you for referring your patient, Sophia De Leon, to the [facility] [place] at [facility]. Please see a copy of my visit note below.    Dermatology Problem List:  1.Nodulocystic acne  2. Hidradenitis suppurativa, Beltran stage III  -Current: benzoyl peroxide 2.5% wash  -Past: oral antibiotics (minocycline and rifampin), Humira (migraines), isotretinoin (dryness, headaches and dizziness), ILK (no benefit?), spironolactone (did not start), anakinra prescribed (did not start).  3. Late phase/regressed pyogenic granuloma, R palmar 4th finger   - s/p excision with Mohs 6/25/19   4. Hx of Afib after childbirth      Encounter Date: October 29, 2020  LV:July 30, 2020     CC:  HS & Acne     Interval Hx October 29, 2020  - Not doing well. Only on minocycline.  It can be 10/10 pain. She is only on ibupreofen and tylenol.     HS Nurse Assessment    Nurse Assessment Data 10/29/2020   Number of new boils in the past month? 3   On average, over the past 7 days, how many dressing changes do you make daily? 2   From 0-10, how severe has your HS been over the past week (0 = no disease, 10 = worst it could be)? 8   Severity of pain of the most symptomatic lesion in the course of daily activities (eg: sitting, moving or working) over the past week? 10 - Worst Pain             Interval hx: 7/30/20  Since the last time I saw Sophia, things have been worse. Her acne/HS worse. [place] have [place] [facility] not hear back about [place]baric. In terms of her HS/acne, doxycyline does not seem to be helpiong for SH, but maybe keeping acne at bay. She developed pretty severe sore throat/swallowing pain.  She has also has had chest pain that lasts a few minutes and with moving. It starts and moving makes it painful. No with exertion  . Sharp pain and then dull and fade.  She does occasionallty get SOb, but does does not relate to chest pain.When she gets the pain its reproducible with palpation. No sweating.  Yesterday got vertigo. She does occasionally get vertigo. She has medicine for migraines. She has BPPV. She has medicine for that. She was referred to physical therapy for it. She does intermittently get palpations. She has been worked up for, with monitor, but she was not able to tell her what it was. He does occasionally get cold sores. It happens every few months.  Tested for celiac and was negative.     History of Present Illness:  I have reviewed the teledermatology information and the nursing intake corresponding to this issue. Sophia De Leon is a 34 year old female who presents via teledermatology for HS/Acne      Her biggest issues have been the face and groin. Axilla clear. Lower abdomen at pant line gets flares Rt>Lt that gets worse with her menstrual cycle (before, during).      Started in early 20s. At age 24 she found out what is was from her dermatologist.     She would like to consider isotretinoin. She was not able to tolerate this previously when prescribed by an outside MD. She also has difficulty with hormonal birth control options due to migraines. She would not be able to get an IUD in the setting of COVID. She does not tolerate oral antibiotics due to vaginal yeast infections and IBS symptoms.      She has three children. It started before pregnancy but gets worse during and has progressively gotten worse with each pregnancy.     She gets down at times. It gets very itchy at times and painful,.     Previous treatments:  -Current:          benzoyl peroxide 2.5% wash         Doxycyline 100mg BID, helps a little but a lot          Clindamycin GI upset              Keflex did help  -Past:               Doxycyline seems to help a little               Minocycline and rifampin - helped but wore off              Humira made it worse - felt fatigue, brain fog, felt  worse, worsened                        Migraines. Worse with the humira.              Isotretinoin (dryness, headaches and dizziness)              Prednisone helped when she was on it but got much worse when   came off              Spironolactone did not seem to help              ILK seems to help                            She has abdominal cramping and constiopation off and on. No blood in the stool. No mucous.1 BM per day.     Crohns in younger cousin     She had a colonoscopy and it was neg     Past Medical History:         Patient Active Problem List   Diagnosis     Acne vulgaris     Hidradenitis suppurativa     Migraine with aura and without status migrainosus, not intractable      Past Medical History   History reviewed. No pertinent past medical history.      Past Surgical History   History reviewed. No pertinent surgical history.         Social History:  Patient reports that she has never smoked. She has never used smokeless tobacco.     Family History:  Family History             Family History   Problem Relation Age of Onset     Melanoma No family hx of       Skin Cancer No family hx of         Cousin with inflammatory bowel on remicaide and got cancer      Medications:  Current Outpatient Medications   Medication     Acetaminophen (TYLENOL PO)     benzoyl peroxide 5 % external liquid     Calcium Carb-Cholecalciferol (CALCIUM/VITAMIN D PO)     clindamycin (CLINDAMAX) 1 % topical gel     clobetasol (TEMOVATE) 0.05 % external cream     clobetasol (TEMOVATE) 0.05 % external ointment     IBUPROFEN PO     loratadine (CLARITIN) 10 MG tablet     Magnesium Oxide -Mg Supplement 250 MG PO tablet     minocycline (MINOCIN) 100 MG capsule     Doxycycline Hyclate 100 MG TBEC     etonogestrel-ethinyl estradiol (NUVARING) 0.12-0.015 MG/24HR vaginal ring     metroNIDAZOLE (METROCREAM) 0.75 % cream     tacrolimus (PROTOPIC) 0.1 % external ointment     triamcinolone (KENALOG) 0.1 % cream     Urea 40 % CREA     valACYclovir  (VALTREX) 1000 mg tablet     Current Facility-Administered Medications   Medication     lidocaine 1% with EPINEPHrine 1:100,000 injection 3 mL          Current Facility-Administered Medications   Medication     lidocaine 1% with EPINEPHrine 1:100,000 injection 3 mL         No Known Allergies    Exam:  - Verrucous papule on the left heel   - Acneiform, inflammatory nodules on the forehead and cheeks   - Ice-pick scarring and scatter pustules   HS Exam  Head/Neck 10/29/2020   # of inflamed nodules 0   # of abcesses 0   # of draining tunnels 1   Erythema (grade) 1   Thickness (grade) 1   Open skin surface (grade) 0   Tunnels (grade) 1       No flowsheet data found.    No flowsheet data found.    Chest 10/29/2020   # of inflamed nodules 1   # of abcesses 0   # of draining tunnels 0   Erythema (grade) 1   Thickness (grade) 1   Open skin surface (grade) 0   Tunnels (grade) 0   Chest Comments significant scarring - not active       No flowsheet data found.     Pubis/Genital 10/29/2020   # of inflamed nodules 2   # of abcesses 0   # of draining tunnels 1   Erythema (grade) 2   Thickness (grade) 2   Open skin surface (grade) 0   Tunnels (grade) 1       No flowsheet data found.    No flowsheet data found.    No flowsheet data found.    No flowsheet data found.    HS Data  HS Exam Data 10/29/2020   LC Type LC1   Clinical Subtypes Regular type   Visual analogue score (0-100) 45   Total Beltran Stage II   Total Inflammatory Nodules 3   Total Abcesses 0   Total Draining Tunnels 2   Total Abscess and Nodule Count 3   IHS4 Score  11   Total  HASI Score 20   HS-PGA 4       A/P  1.HS/Acne   -  Will plan to start ustekinumab infusion followed by 90mg every 4 week subQ  - Pain management referral  -  Ordered telfa  - Clobetasol cream BID for pruritic      RTC in 4 weeks for phone visit to make sure she got started on treatment    Timmy Franco MD, FAAD    Departments of Internal Medicine and Dermatology  [place]  [facility]  [phone]

## 2020-10-29 NOTE — NURSING NOTE
Dermatology Rooming Note    Sophia De Leon's goals for this visit include:   Chief Complaint   Patient presents with     Derm Problem     Sophia is here for a HS follow up, states she has new areas. Also has a concern on her left foot.        Cherelle Stapleton LPN

## 2020-10-30 ENCOUNTER — TELEPHONE (OUTPATIENT)
Dept: PALLIATIVE MEDICINE | Facility: CLINIC | Age: 35
End: 2020-10-30

## 2020-10-30 NOTE — TELEPHONE ENCOUNTER
Order for Evaluation for Comprehensive Services was meant for [facility]. Verified with patient that is where she wanted to be scheduled and transferred her to [facility].    Mandy REYNOSO    [facility]

## 2020-11-04 ENCOUNTER — TELEPHONE (OUTPATIENT)
Dept: DERMATOLOGY | Facility: CLINIC | Age: 35
End: 2020-11-04

## 2020-11-04 DIAGNOSIS — L73.2 HIDRADENITIS SUPPURATIVA: ICD-10-CM

## 2020-11-04 NOTE — TELEPHONE ENCOUNTER
PA Initiation    Medication: Stelara 90mg [id number] INITIATED  Insurance Company: [facility] - Phone [phone] Fax [phone]  Pharmacy Filling the Rx: [facility] - [place], [place] - [address]  Filling Pharmacy Phone:    Filling Pharmacy Fax:    Start Date: 11/4/2020

## 2020-11-06 NOTE — TELEPHONE ENCOUNTER
Appeal Initiated  Medication: Stelara  Payer or group: [employer] and [place]  Phone #[phone]  Fax #[phone]

## 2020-11-06 NOTE — TELEPHONE ENCOUNTER
PRIOR AUTHORIZATION DENIED    Medication: Stelara 90mg [id number] Denied    Denial Date: 11/5/2020    Denial Rational: Off label use    Appeal Information: Salvador already prepared a LMN, will fax this in to [facility] member appeals and gradamvances fax [phone]

## 2020-11-10 NOTE — TELEPHONE ENCOUNTER
"Prior authorization for Laser hair removal to treat hidradenitis suppartiva was denied.     \"This service is denied because cosmetic services are not covered by your health plan. According to [facility] Medical Policy Reconstructive and Cosmetic Health service ([id number]): Hair removal or replacement by any means is considered a cosmetic service and not medically necessary. According to the [place] [address] [employer] member handbook, Section 8, \"Services we do not cover\": Cosmetic procedures or treatments are not covered.    We are able to appeal this decision within 60 days from 11/9/2020 by phone [phone] or writing by mailing it to:    [facility]  Attn: Member Appeals and Grievances  [address]  [place] [address]    Writer reached out Shaila, [facility] , advised Shaila that the case is not approved. Shaila states she will reach out to [facility] to find out more information. Shaila will also reach out to the patient. Writer also reached out to the patient to advise her of the denial. Patient will await Case workers call and discuss if they would like to appeal this case.     "

## 2020-11-17 NOTE — TELEPHONE ENCOUNTER
Appeal Approved  Effective Dates: 10/7/2020 through 11/9/2021  Patient notified? Had to LM  Additional Information: Need an Rx to [facility], Waiting for nurses to send an Rx for this (not sent yet) and insurance called and asked them to refax approval letter

## 2020-11-17 NOTE — TELEPHONE ENCOUNTER
Writer received a call from Shaila. Francine attempted to reach out again to Shaila but had to leave a VM.

## 2020-11-18 RX ORDER — USTEKINUMAB 90 MG/ML
INJECTION, SOLUTION SUBCUTANEOUS
Qty: 1 ML | Refills: 3 | Status: SHIPPED | OUTPATIENT
Start: 2020-11-18 | End: 2022-03-29

## 2020-11-19 NOTE — TELEPHONE ENCOUNTER
received another VM from Romina. She is requesting an appeal be started for this patient.     Romina would like to know if a more detailed letter of medical necessity can be written regarding this patient.  initially sent the office visits and the letter of medical necessity that was typed up by Dr. Franco on 10/14/2020.     Please advise on what your thoughts are? If you are willing to write a more detailed letter or have any other information pertaining to the medical need for this procedure for the patient please forward them to the  FC's so we can appeal the denial.

## 2020-12-04 NOTE — TELEPHONE ENCOUNTER
[facility]. She states that the appeal information was faxed to the incorrect department and needs to be faxed to [phone].    Writer refaxed all info to the [facility] Appeals department to the fax number above.

## 2020-12-08 NOTE — TELEPHONE ENCOUNTER
Writer also received notification from Romina at [facility] stating she received another notification from [facility] regarding possible approval for CPT code 72363.    Writer reached out to [facility] and spoke with Ponce. Ponce was unable to provide any information and transferred writer to the appeals dept, [phone]. Spoke with Juvenc[employer]. Appeal request was received and is pending review. Can take up to 30 days.     Writer reached out to patient as well and left a VM.

## 2020-12-08 NOTE — TELEPHONE ENCOUNTER
Writer received a call back from patient. She states that she has additional clinicals from outside providers regarding treatments. She states that she was seen at [facility] in the past. Patient will have records faxed to 's at [phone].     Once received please fax to the appeals department at [facility] [phone] and reference the CPT code and patient's Insurance ID #.

## 2020-12-27 ENCOUNTER — HEALTH MAINTENANCE LETTER (OUTPATIENT)
Age: 35
End: 2020-12-27

## 2020-12-28 NOTE — TELEPHONE ENCOUNTER
[facility] called the central pa team stating that they received a letter from provider to appeal Stelara. However, there is already a pa approval for this medication. [facility] will  withdraw this case and if the provider needs anything else, please contact [facility].   

## 2020-12-31 ENCOUNTER — VIRTUAL VISIT (OUTPATIENT)
Dept: DERMATOLOGY | Facility: CLINIC | Age: 35
End: 2020-12-31
Payer: COMMERCIAL

## 2020-12-31 ENCOUNTER — MYC MEDICAL ADVICE (OUTPATIENT)
Dept: DERMATOLOGY | Facility: CLINIC | Age: 35
End: 2020-12-31

## 2020-12-31 DIAGNOSIS — Z79.899 ENCOUNTER FOR LONG-TERM (CURRENT) USE OF HIGH-RISK MEDICATION: Primary | ICD-10-CM

## 2020-12-31 DIAGNOSIS — L21.9 DERMATITIS, SEBORRHEIC: ICD-10-CM

## 2020-12-31 PROCEDURE — 99214 OFFICE O/P EST MOD 30 MIN: CPT | Mod: 95 | Performed by: DERMATOLOGY

## 2020-12-31 RX ORDER — KETOCONAZOLE 20 MG/ML
SHAMPOO TOPICAL DAILY PRN
Qty: 120 ML | Refills: 3 | Status: SHIPPED | OUTPATIENT
Start: 2020-12-31 | End: 2022-11-28

## 2020-12-31 ASSESSMENT — PAIN SCALES - GENERAL: PAINLEVEL: EXTREME PAIN (8)

## 2020-12-31 NOTE — PATIENT INSTRUCTIONS
Check quantiferon   Start stelara  Follow-up cardiologist for palpitauons  Ketoconazole shampoo every other day. Ok to try on chest as well.

## 2020-12-31 NOTE — LETTER
Date:January 4, 2021      Patient was self referred, no letter generated. Do not send.        [facility] Health Information

## 2020-12-31 NOTE — LETTER
12/31/2020       RE: Sophia De Leon  [address]  [place] [address]     Dear Colleague,    Thank you for referring your patient, Sophia De Leon, to the [facility]MATOLOG[facility] [place] at [facility]. Please see a copy of my visit note below.    [facility] Dermatology Record:  Store and Forward and Telephone [phone]      Dermatology Problem List:  1.Nodulocystic acne  2. Hidradenitis suppurativa, Beltran stage III  -Current: benzoyl peroxide 2.5% wash  -Past: oral antibiotics (minocycline and rifampin), Humira (migraines), isotretinoin (dryness, headaches and dizziness),  spironolactone (did not start), anakinra prescribed (did not start).  - ILK did help the inflammatory on cheek  - Prednisone made her worse and made dizziness worse.  - Last quantiferon gold: 2018  - Hep C ab neg 11/3/20  - HIV neg 11/3/20  - Hep B: surface ab +, surface antigen neg c/w immunity 8/12/18  - CBC last wnl 11/3/20  - BMP wnl 12/27/20  3. Late phase/regressed pyogenic granuloma, R palmar 4th finger   - s/p excision with Mohs 6/25/19   4. Hx of Afib after childbirth   5. Wart, left heel  6. Hx of high-risk HPV    Encounter Date: Dec 31, 2020    CC:   Chief Complaint   Patient presents with     Derm Problem     Patient visit follow up for HS, current flare groin and back of thigh. blurred vision, yeast infection, headaches. since last visit, unsure if from minocycline.        History of Present Illness:  Interval Hx: 12/31/20  Stelara was approved, but she has not started yet.     HS Nurse Assessment    Nurse Assessment Data 10/29/2020 12/31/2020   Number of new boils in the past month? 3 5   On average, over the past 7 days, how many dressing changes do you make daily? 2 3+   From 0-10, how severe has your HS been over the past week (0 = no disease, 10 = worst it could be)? 8 10   Severity of pain of the most symptomatic lesion in the course of daily activities (eg: sitting,  "moving or working) over the past week? 10 - Worst Pain 10 - Worst Pain   Total DLQI Score - 25 (extremely large effect on patient's life)     20 min ago, she was eating and then she felt her heart flutter and she felt like she couldn't swallow. It came on randomly and suddenly and she did not feel like it was related to anxiety. She has not had this before when eating.  She has had it happen a few times a week, but not during eating. She does have history of a fib.  It does feel painful with difficulty breathing. It typically last afew seconds.She has never done the monitor. She feels dizzy where she feels like she feels week and like she is going to pass out. It happens. That also is random. Sometime it is vertigo. She stopped the minocycline to see if it is related. It not get better. Currently only taking. She also has been getting yeast infections. She has not seen pain management yet.    Topical steroids did not help with the itching. Could have been making It worse.  After I saw her she flared a lot. Minocycline stopped helping.     She stopped therapy. She has tried a lot of med in the past that made her dizzy.       Past Medical History:   Patient Active Problem List   Diagnosis     Acne vulgaris     Hidradenitis suppurativa     Migraine with aura and without status migrainosus, not intractable     Social History:  Patient reports that she has never smoked. She has never used smokeless tobacco.    Family History:  Family History   Problem Relation Age of Onset     Melanoma No family hx of      Skin Cancer No family hx of        Medications:  Current Outpatient Medications   Medication     Acetaminophen (TYLENOL PO)     benzoyl peroxide 5 % external liquid     clobetasol (TEMOVATE) 0.05 % external ointment     Gauze Pads & Dressings (TELFA NON-ADHERENT) 3\"X4\" PADS     IBUPROFEN PO     loratadine (CLARITIN) 10 MG tablet     Calcium Carb-Cholecalciferol (CALCIUM/VITAMIN D PO)     clindamycin (CLINDAMAX) 1 % " topical gel     clobetasol (TEMOVATE) 0.05 % external cream     clobetasol (TEMOVATE) 0.05 % external cream     Doxycycline Hyclate 100 MG TBEC     etonogestrel-ethinyl estradiol (NUVARING) 0.12-0.015 MG/24HR vaginal ring     Magnesium Oxide -Mg Supplement 250 MG PO tablet     metroNIDAZOLE (METROCREAM) 0.75 % cream     minocycline (MINOCIN) 100 MG capsule     tacrolimus (PROTOPIC) 0.1 % external ointment     triamcinolone (KENALOG) 0.1 % cream     Urea 40 % CREA     ustekinumab (STELARA) 90 MG/ML     valACYclovir (VALTREX) 1000 mg tablet     Current Facility-Administered Medications   Medication     lidocaine 1% with EPINEPHrine 1:100,000 injection 3 mL       No Known Allergies      Impression and Recommendations (Patient Counseled on the Following):  1. Palpitations  - Hx of afib. Will need cardiac monitor. ? SVT.   - She will let her PCP know     2. Dizziness  - Feels unsteady and light headed  - She sees neurology  - Brain/spine MRI wnl    3. HS  - Had to stop minocycline due to questionably making the dizziness worse and repeated yeast infection.  - No difference in HS, but acne got worse.  - Will check quantiferon and start stelara and see if it helps    4) Scaling on scalp   - Ketoconazole shampoo every other day     5) Hair loss   - Aron discuss at follow-up    Follow-up:  6 week phone call     Staff only    Timmy Franco MD, FAAD    Departments of Internal Medicine and Dermatology  [facility]  [phone]  _____________________________________________________________________________    Teledermatology information:  - Location of teledermatologist:  ([facility] DERMATOLOGY [facility] [place] )  - Image quality and interpretability: acceptable  - Service start time: 5:05pm  - Service end time:5:35pm  - Date of report: 12/31/2020       Again, thank you for allowing me to participate in the care of your patient.      Sincerely,    Timmy Franco MD

## 2020-12-31 NOTE — NURSING NOTE
Chief Complaint   Patient presents with     Derm Problem     Patient visit follow up for HS, current flare groin and back of thigh.      Cecelia GIRON CMA

## 2020-12-31 NOTE — PROGRESS NOTES
[facility] Dermatology Record:  Store and Forward and Telephone [phone]      Dermatology Problem List:  1.Nodulocystic acne  2. Hidradenitis suppurativa, Beltran stage III  -Current: benzoyl peroxide 2.5% wash  -Past: oral antibiotics (minocycline and rifampin), Humira (migraines), isotretinoin (dryness, headaches and dizziness),  spironolactone (did not start), anakinra prescribed (did not start).  - ILK did help the inflammatory on cheek  - Prednisone made her worse and made dizziness worse.  - Last quantiferon gold: 2018  - Hep C ab neg 11/3/20  - HIV neg 11/3/20  - Hep B: surface ab +, surface antigen neg c/w immunity 8/12/18  - CBC last wnl 11/3/20  - BMP wnl 12/27/20  3. Late phase/regressed pyogenic granuloma, R palmar 4th finger   - s/p excision with Mohs 6/25/19   4. Hx of Afib after childbirth   5. Wart, left heel  6. Hx of high-risk HPV    Encounter Date: Dec 31, 2020    CC:   Chief Complaint   Patient presents with     Derm Problem     Patient visit follow up for HS, current flare groin and back of thigh. blurred vision, yeast infection, headaches. since last visit, unsure if from minocycline.        History of Present Illness:  Interval Hx: 12/31/20  Stelara was approved, but she has not started yet.     HS Nurse Assessment    Nurse Assessment Data 10/29/2020 12/31/2020   Number of new boils in the past month? 3 5   On average, over the past 7 days, how many dressing changes do you make daily? 2 3+   From 0-10, how severe has your HS been over the past week (0 = no disease, 10 = worst it could be)? 8 10   Severity of pain of the most symptomatic lesion in the course of daily activities (eg: sitting, moving or working) over the past week? 10 - Worst Pain 10 - Worst Pain   Total DLQI Score - 25 (extremely large effect on patient's life)     20 min ago, she was eating and then she felt her heart flutter and she felt like she couldn't swallow. It came on randomly and suddenly and she did not feel like it  "was related to anxiety. She has not had this before when eating.  She has had it happen a few times a week, but not during eating. She does have history of a fib.  It does feel painful with difficulty breathing. It typically last afew seconds.She has never done the monitor. She feels dizzy where she feels like she feels week and like she is going to pass out. It happens. That also is random. Sometime it is vertigo. She stopped the minocycline to see if it is related. It not get better. Currently only taking. She also has been getting yeast infections. She has not seen pain management yet.    Topical steroids did not help with the itching. Could have been making It worse.  After I saw her she flared a lot. Minocycline stopped helping.     She stopped therapy. She has tried a lot of med in the past that made her dizzy.       Past Medical History:   Patient Active Problem List   Diagnosis     Acne vulgaris     Hidradenitis suppurativa     Migraine with aura and without status migrainosus, not intractable     Social History:  Patient reports that she has never smoked. She has never used smokeless tobacco.    Family History:  Family History   Problem Relation Age of Onset     Melanoma No family hx of      Skin Cancer No family hx of        Medications:  Current Outpatient Medications   Medication     Acetaminophen (TYLENOL PO)     benzoyl peroxide 5 % external liquid     clobetasol (TEMOVATE) 0.05 % external ointment     Gauze Pads & Dressings (TELFA NON-ADHERENT) 3\"X4\" PADS     IBUPROFEN PO     loratadine (CLARITIN) 10 MG tablet     Calcium Carb-Cholecalciferol (CALCIUM/VITAMIN D PO)     clindamycin (CLINDAMAX) 1 % topical gel     clobetasol (TEMOVATE) 0.05 % external cream     clobetasol (TEMOVATE) 0.05 % external cream     Doxycycline Hyclate 100 MG TBEC     etonogestrel-ethinyl estradiol (NUVARING) 0.12-0.015 MG/24HR vaginal ring     Magnesium Oxide -Mg Supplement 250 MG PO tablet     metroNIDAZOLE (METROCREAM) 0.75 " % cream     minocycline (MINOCIN) 100 MG capsule     tacrolimus (PROTOPIC) 0.1 % external ointment     triamcinolone (KENALOG) 0.1 % cream     Urea 40 % CREA     ustekinumab (STELARA) 90 MG/ML     valACYclovir (VALTREX) 1000 mg tablet     Current Facility-Administered Medications   Medication     lidocaine 1% with EPINEPHrine 1:100,000 injection 3 mL       No Known Allergies      Impression and Recommendations (Patient Counseled on the Following):  1. Palpitations  - Hx of afib. Will need cardiac monitor. ? SVT.   - She will let her PCP know     2. Dizziness  - Feels unsteady and light headed  - She sees neurology  - Brain/spine MRI wnl    3. HS  - Had to stop minocycline due to questionably making the dizziness worse and repeated yeast infection.  - No difference in HS, but acne got worse.  - Will check quantiferon and start stelara and see if it helps    4) Scaling on scalp   - Ketoconazole shampoo every other day     5) Hair loss   - Aron discuss at follow-up    Follow-up:  6 week phone call     Staff only    Timmy Franco MD, FAAD    Departments of Internal Medicine and Dermatology  [facility]  [phone]  _____________________________________________________________________________    Teledermatology information:  - Location of teledermatologist:  [facility]ERMATOLOGY [facility] [place] )  - Image quality and interpretability: acceptable  - Service start time: 5:05pm  - Service end time:5:35pm  - Date of report: 12/31/2020

## 2021-01-04 ENCOUNTER — TELEPHONE (OUTPATIENT)
Dept: DERMATOLOGY | Facility: CLINIC | Age: 36
End: 2021-01-04

## 2021-02-11 ENCOUNTER — VIRTUAL VISIT (OUTPATIENT)
Dept: DERMATOLOGY | Facility: CLINIC | Age: 36
End: 2021-02-11
Payer: COMMERCIAL

## 2021-02-11 DIAGNOSIS — L73.2 HIDRADENITIS SUPPURATIVA: Primary | ICD-10-CM

## 2021-02-11 PROCEDURE — 99214 OFFICE O/P EST MOD 30 MIN: CPT | Mod: 95 | Performed by: DERMATOLOGY

## 2021-02-11 RX ORDER — FAMOTIDINE 40 MG/1
40 TABLET, FILM COATED ORAL DAILY
COMMUNITY
End: 2022-03-29

## 2021-02-11 ASSESSMENT — PAIN SCALES - GENERAL: PAINLEVEL: SEVERE PAIN (7)

## 2021-02-11 NOTE — NURSING NOTE
Chief Complaint   Patient presents with     Derm Problem     Sophia has a telephone visit for HS. She hasn't started her treatment yet.      Cecelia Olson LPN

## 2021-02-11 NOTE — PATIENT INSTRUCTIONS
[facility] Dermatology Visit    Thank you for allowing us to participate in your care. Your findings, instructions and follow-up plan are as follows:         When should I call my doctor?    If you are worsening or not improving, please, contact us or seek urgent care as noted below.     Who should I call with questions (adults)?    [facility] (adult and pediatric): [phone]     [facility] (adult): [phone]    For urgent needs outside of business hours call the [facility] at [phone] and ask for the dermatology resident on call    If this is a medical emergency and you are unable to reach an ER, Call 911      Who should I call with questions (pediatric)?  [facility]- Pediatric Dermatology  Dr. Diana Huston, Dr. Mahendra Dela Cruz, Dr. Omayra Lerma, Maxine Carter, PA  Dr. Angelique Almonte, Dr. Brenda Kovacs & Dr. Henri Underwood  Non Urgent  Nurse Triage Line; [phone]- Angelica and Jannie RN Care Coordinators   [place] (/Complex ) [phone]    If you need a prescription refill, please contact your pharmacy. Refills are approved or denied by our Physicians during normal business hours, Monday through Fridays  Per office policy, refills will not be granted if you have not been seen within the past year (or sooner depending on your child's condition)    Scheduling Information:  Pediatric Appointment Scheduling and Call Center [phone]  Radiology Scheduling- [phone]  Sedation Unit Scheduling- [phone]  [place] Scheduling- General [phone]; Pediatric Dermatology [phone]  Main  Services: [phone]  Urdu: [phone]  Cymraes: [phone]  Hmong/Setswana/Amharic: [phone]  Preadmission Nursing Department Fax Number: [phone] (Fax all pre-operative paperwork to this number)    For urgent matters arising during evenings,  weekends, or holidays that cannot wait for normal business hours please call [phone] and ask for the Dermatology Resident On-Call to be paged.

## 2021-02-11 NOTE — PROGRESS NOTES
Dermatology Phone Visit: Phone Number:[phone]     Dermatology Problem List:  1.Nodulocystic acne  2. Hidradenitis suppurativa, Beltran stage III  Stelara approved, but patient not comfortable starting yet given ongoing other health concerns (dizziness/lightheadedness/vertigo, sore throat/GERD, palpitations)  -Current: benzoyl peroxide 2.5% wash, clobetasol 0.05% ointment BID PRN  -Past: oral antibiotics (minocycline and rifampin), Humira (migraines), isotretinoin (dryness, headaches and dizziness),  spironolactone (did not start), anakinra prescribed (did not start).  - ILK did help the inflammatory on cheek  - Prednisone made her worse and made dizziness worse.  - Last quantiferon gold: 2018  - Hep C ab neg 11/3/20  - HIV neg 11/3/20  - Hep B: surface ab +, surface antigen neg c/w immunity 8/12/18  - CBC last wnl 11/3/20  - BMP wnl 12/27/20  3. Late phase/regressed pyogenic granuloma, R palmar 4th finger   - s/p excision with Mohs 6/25/19   4. Hx of Afib after childbirth   5. Wart, left heel  6. Hx of high-risk HPV    Patient opted to conduct today's return visit via telephone vs an in person visit to the clinic.    Encounter Date: Feb 11, 2021    Chief complaint:   Chief Complaint   Patient presents with     Derm Problem     Sophia has a telephone visit for HS. Flare in her groin. She hasn't started her treatment yet.        I spoke with: Sophia De Leon    The reason for the telephone visit was:   Follow up of hidradenitis suppurativa.     Pertinent history and review of systems:  Last virtual appointment was 12/31/2020. Stelara has been approved by insurance for treatment of HS, but patient has decided not to start this yet, because she has been having other ongoing health concerns including throat pain, palpitations, lightheadedness and vertigo. She doesn't want to start a new medication while these issues are active because she doesn't want to cloud her current health picture. She is being evaluated by her  PCP for the above concerns. Regarding sore throat, patient being treated for GERD. H. Pylori stool testing still pending.     Regarding her HS, she says that she is having some smaller flares. She continues to use benzoyl peroxide wash and is also using topical clobetasol ointment, and clindamycin lotion as needed. She doesn't want to start an oral antibiotic because she says this lead to her developing yeast infections.       HS Nurse Assessment    Nurse Assessment Data 10/29/2020 12/31/2020 2/11/2021   Over the past month, about how many recurrent or new boils have you had? 3 5 5   Over the past week, how many dressing changes do you do each day? 2 3+ 3+   Over the past week, has your wound drainage been: Moderate Moderate Mild   Rate your HS overall from 0-10 (0 = no disease, 10 = worst) over the past week:  8 10 9   Rate your pain score from 0-10 (0 = no disease, 10 = worst) for the most painful/symptomatic lesion in the past week:  10 - Worst Pain 10 - Worst Pain 8   Over the past week, how much has HS influenced your quality of life? - - moderately   Total DLQI Score - 25 (extremely large effect on patient's life) -             Assessment/Advice/instructions given to patient/guardian including prescriptions, follow up appointment or orders for diagnostic testing:    # HS: patient currently struggling through flares as she is only treating with BPO wash and topical clobetasol. Patient worried to start Stelara since she has ongoing health concerns with (dizziness/lightheadedness/vertigo, sore throat/GERD, palpitations). Patient being treated for GERD, H.pyroli stool antigen is still pending as patient has not sent this in yet. Regarding her current health concerns, Stelara would potentially only interfere with infectious etiologies (e.g. Thrush). Unclear how Stelara may affect H.pylori infection and would need to further look into this. Discussed with patient that she should submit the H.pylori stool sample. If  this is negative, would recommend that she start Stelara. If it is positive, she should also let us know this, and we can investigate if there are any case reports/studies of taking Stelara while infected with H.pylori.     RTC in 8 weeks for phone visit.     Phone call contact time:  Call Started at: 4:15 pm  Call Ended at: 4:35 pm    Staff and resident:     Clint Freire MD, PhD  Med-Derm PGY-5     Staff Physician Comments:   I saw and evaluated the patient with the resident and I agree with the assessment and plan.  I was present for the examination.    Timmy Franco MD, FAAD    Departments of Internal Medicine and Dermatology  [facility]  [phone]

## 2021-02-11 NOTE — LETTER
2/11/2021       RE: Sophia Haley  [address]  [place] [address]     Dear Colleague,    Thank you for referring your patient, Sophia De Leon, to the [facility]ERMATOLOGY [facility]IN[facility] [place] at [facility]. Please see a copy of my visit note below.    Dermatology Phone Visit: Phone Number:[phone]     Dermatology Problem List:  1.Nodulocystic acne  2. Hidradenitis suppurativa, Beltran stage III  Stelara approved, but patient not comfortable starting yet given ongoing other health concerns (dizziness/lightheadedness/vertigo, sore throat/GERD, palpitations)  -Current: benzoyl peroxide 2.5% wash, clobetasol 0.05% ointment BID PRN  -Past: oral antibiotics (minocycline and rifampin), Humira (migraines), isotretinoin (dryness, headaches and dizziness),  spironolactone (did not start), anakinra prescribed (did not start).  - ILK did help the inflammatory on cheek  - Prednisone made her worse and made dizziness worse.  - Last quantiferon gold: 2018  - Hep C ab neg 11/3/20  - HIV neg 11/3/20  - Hep B: surface ab +, surface antigen neg c/w immunity 8/12/18  - CBC last wnl 11/3/20  - BMP wnl 12/27/20  3. Late phase/regressed pyogenic granuloma, R palmar 4th finger   - s/p excision with Mohs 6/25/19   4. Hx of Afib after childbirth   5. Wart, left heel  6. Hx of high-risk HPV    Patient opted to conduct today's return visit via telephone vs an in person visit to the clinic.    Encounter Date: Feb 11, 2021    Chief complaint:   Chief Complaint   Patient presents with     Derm Problem     Sophia has a telephone visit for HS. Flare in her groin. She hasn't started her treatment yet.        I spoke with: Sophia Haley    The reason for the telephone visit was:   Follow up of hidradenitis suppurativa.     Pertinent history and review of systems:  Last virtual appointment was 12/31/2020. Stelara has been approved by insurance for treatment of HS, but patient has  decided not to start this yet, because she has been having other ongoing health concerns including throat pain, palpitations, lightheadedness and vertigo. She doesn't want to start a new medication while these issues are active because she doesn't want to cloud her current health picture. She is being evaluated by her PCP for the above concerns. Regarding sore throat, patient being treated for GERD. H. Pylori stool testing still pending.     Regarding her HS, she says that she is having some smaller flares. She continues to use benzoyl peroxide wash and is also using topical clobetasol ointment, and clindamycin lotion as needed. She doesn't want to start an oral antibiotic because she says this lead to her developing yeast infections.       HS Nurse Assessment    Nurse Assessment Data 10/29/2020 12/31/2020 2/11/2021   Over the past month, about how many recurrent or new boils have you had? 3 5 5   Over the past week, how many dressing changes do you do each day? 2 3+ 3+   Over the past week, has your wound drainage been: Moderate Moderate Mild   Rate your HS overall from 0-10 (0 = no disease, 10 = worst) over the past week:  8 10 9   Rate your pain score from 0-10 (0 = no disease, 10 = worst) for the most painful/symptomatic lesion in the past week:  10 - Worst Pain 10 - Worst Pain 8   Over the past week, how much has HS influenced your quality of life? - - moderately   Total DLQI Score - 25 (extremely large effect on patient's life) -             Assessment/Advice/instructions given to patient/guardian including prescriptions, follow up appointment or orders for diagnostic testing:    # HS: patient currently struggling through flares as she is only treating with BPO wash and topical clobetasol. Patient worried to start Stelara since she has ongoing health concerns with (dizziness/lightheadedness/vertigo, sore throat/GERD, palpitations). Patient being treated for GERD, H.pyroli stool antigen is still pending as  patient has not sent this in yet. Regarding her current health concerns, Stelara would potentially only interfere with infectious etiologies (e.g. Thrush). Unclear how Stelara may affect H.pylori infection and would need to further look into this. Discussed with patient that she should submit the H.pylori stool sample. If this is negative, would recommend that she start Stelara. If it is positive, she should also let us know this, and we can investigate if there are any case reports/studies of taking Stelara while infected with H.pylori.     RTC in 8 weeks for phone visit.     Phone call contact time:  Call Started at: 4:15 pm  Call Ended at: 4:35 pm    Staff and resident:     Clint Freire MD, PhD  Med-Derm PGY-5     Staff Physician Comments:   I saw and evaluated the patient with the resident and I agree with the assessment and plan.  I was present for the examination.    Timmy Franco MD, FAAD    Departments of Internal Medicine and Dermatology  [facility]  [phone]             Again, thank you for allowing me to participate in the care of your patient.      Sincerely,    Timmy Franco MD

## 2021-02-11 NOTE — LETTER
Date:February 27, 2021      Patient was self referred, no letter generated. Do not send.        [facility] Health Information

## 2021-04-02 NOTE — TELEPHONE ENCOUNTER
Writer received a call from Geraldine from [facility]. She is inquiring on the status of the appeal. Writer left a VM for Geraldine to call back. [phone].

## 2021-04-22 ENCOUNTER — VIRTUAL VISIT (OUTPATIENT)
Dept: DERMATOLOGY | Facility: CLINIC | Age: 36
End: 2021-04-22
Payer: COMMERCIAL

## 2021-04-22 DIAGNOSIS — B37.2 CANDIDAL INTERTRIGO: ICD-10-CM

## 2021-04-22 DIAGNOSIS — L70.0 ACNE VULGARIS: ICD-10-CM

## 2021-04-22 DIAGNOSIS — L73.2 HIDRADENITIS SUPPURATIVA: Primary | ICD-10-CM

## 2021-04-22 PROCEDURE — 99214 OFFICE O/P EST MOD 30 MIN: CPT | Mod: 95 | Performed by: DERMATOLOGY

## 2021-04-22 RX ORDER — SPIRONOLACTONE 50 MG/1
100 TABLET, FILM COATED ORAL DAILY
Qty: 180 TABLET | Refills: 3 | Status: SHIPPED | OUTPATIENT
Start: 2021-04-22 | End: 2021-10-28

## 2021-04-22 RX ORDER — CLOTRIMAZOLE 1 %
CREAM (GRAM) TOPICAL 2 TIMES DAILY
Qty: 45 G | Refills: 3 | Status: SHIPPED | OUTPATIENT
Start: 2021-04-22 | End: 2021-10-28

## 2021-04-22 NOTE — LETTER
4/22/2021       RE: Sophia Haley  [address]  [place] [address]     Dear Colleague,    Thank you for referring your patient, Sophia De Leon, to the [facility] DERMATOLOGY [facility]INIC [place] at [facility]. Please see a copy of my visit note below.    Dermatology Phone Visit: Phone Number:[phone]      Dermatology Problem List:  1.Nodulocystic acne  2. Hidradenitis suppurativa, Beltran stage III  Stelara approved, but patient not comfortable starting yet given ongoing other health concerns (dizziness/lightheadedness/vertigo, sore throat/GERD, palpitations)  -Current: benzoyl peroxide 2.5% wash, clobetasol 0.05% ointment BID PRN  -Past: oral antibiotics (minocycline and rifampin), Humira (migraines), isotretinoin (dryness, headaches and dizziness),  spironolactone (did not start), anakinra prescribed (did not start).  - ILK did help the inflammatory on cheek  - Prednisone made her worse and made dizziness worse.  - Last quantiferon gold: 2018  - Hep C ab neg 11/3/20  - HIV neg 11/3/20  - Hep B: surface ab +, surface antigen neg c/w immunity 8/12/18  - CBC last wnl 11/3/20  - BMP wnl 12/27/20  3. Late phase/regressed pyogenic granuloma, R palmar 4th finger   - s/p excision with Mohs 6/25/19   4. Hx of Afib after childbirth   5. Wart, left heel  6. Hx of high-risk HPV     Patient opted to conduct today's return visit via telephone vs an in person visit to the clinic.     Encounter Date: April 22, 2021   LV: Feb 11, 2021     Chief complaint:   Chief Complaint   Patient presents with     Derm Problem     states that her HS has not been doing great, has had a few new flares       I spoke with: Sophia Mckenziemateusz     The reason for the telephone visit was:   Follow up of hidradenitis suppurativa.      Pertinent history and review of systems April 22, 2021  We talked last in Feb. At that time she was flaring a lot. She was nervous to start stelara due to  dizziness, but this has been chronic and we recommended that if her H pylori test came back negative, that she should start the stelara. She did not start the stelara because she has been having a lot of issues with her throat.Her PCP set up a endoscopy for that.  She wants to get that done first. She feels like somnething is stuck in her throat or lump in the back sometimes when swallowing trhoughout day. No pain with swallowing.        HS Nurse Assessment    Nurse Assessment Data 2020   Over the past month, about how many recurrent or new boils have you had? 5 5 4   Over the past week, how many dressing changes do you do each day? 3+ 3+ 3+   Over the past week, has your wound drainage been: Moderate Mild Moderate   Rate your HS overall from 0-10 (0 = no disease, 10 = worst) over the past week:  10 9 7-8   Rate your pain score from 0-10 (0 = no disease, 10 = worst) for the most painful/symptomatic lesion in the past week:  10 - Worst Pain 8 7   Over the past week, how much has HS influenced your quality of life? - moderately moderately   Total DLQI Score 25 (extremely large effect on patient's life) - -       Assessment/Advice/instructions given to patient/guardian including prescriptions, follow up appointment or orders for diagnostic testin) Hidradenitis Suppurativa   - Will plan to start spironolactone and touch base in 12 weeks.   - Clobetasol cream for itch  - Can consider dapsone to in the future if she wants to avoid biologics. Dapsone will also potentially help with her acne.     2) Acne  - Benzoyl peroxide 5% wash   - Clindamycin lotion    3) Candida intertrigo  - Clotrimazole cream BID PRN    RTC in 12 weeks for phone visit.      Phone call contact time:  Call Started at:9:00pm  Call Ended at: 9:25pm     Timmy Franco MD, FAAD    Departments of Internal Medicine and Dermatology  [facility]  [phone]        Again, thank you for allowing me  to participate in the care of your patient.      Sincerely,    Timmy Franco MD

## 2021-04-22 NOTE — LETTER
Date:May 3, 2021      Patient was self referred, no letter generated. Do not send.        [facility] Health Information

## 2021-04-23 NOTE — PROGRESS NOTES
Dermatology Phone Visit: Phone Number:[phone]      Dermatology Problem List:  1.Nodulocystic acne  2. Hidradenitis suppurativa, Beltran stage III  Stelara approved, but patient not comfortable starting yet given ongoing other health concerns (dizziness/lightheadedness/vertigo, sore throat/GERD, palpitations)  -Current: benzoyl peroxide 2.5% wash, clobetasol 0.05% ointment BID PRN  -Past: oral antibiotics (minocycline and rifampin), Humira (migraines), isotretinoin (dryness, headaches and dizziness),  spironolactone (did not start), anakinra prescribed (did not start).  - ILK did help the inflammatory on cheek  - Prednisone made her worse and made dizziness worse.  - Last quantiferon gold: 2018  - Hep C ab neg 11/3/20  - HIV neg 11/3/20  - Hep B: surface ab +, surface antigen neg c/w immunity 8/12/18  - CBC last wnl 11/3/20  - BMP wnl 12/27/20  3. Late phase/regressed pyogenic granuloma, R palmar 4th finger   - s/p excision with Mohs 6/25/19   4. Hx of Afib after childbirth   5. Wart, left heel  6. Hx of high-risk HPV     Patient opted to conduct today's return visit via telephone vs an in person visit to the clinic.     Encounter Date: April 22, 2021   LV: Feb 11, 2021     Chief complaint:   Chief Complaint   Patient presents with     Derm Problem     states that her HS has not been doing great, has had a few new flares       I spoke with: Sophia De Leon     The reason for the telephone visit was:   Follow up of hidradenitis suppurativa.      Pertinent history and review of systems April 22, 2021  We talked last in Feb. At that time she was flaring a lot. She was nervous to start stelara due to dizziness, but this has been chronic and we recommended that if her H pylori test came back negative, that she should start the stelara. She did not start the stelara because she has been having a lot of issues with her throat.Her PCP set up a endoscopy for that.  She wants to get that done first. She feels like  somnething is stuck in her throat or lump in the back sometimes when swallowing trhoughout day. No pain with swallowing.        HS Nurse Assessment    Nurse Assessment Data 2020   Over the past month, about how many recurrent or new boils have you had? 5 5 4   Over the past week, how many dressing changes do you do each day? 3+ 3+ 3+   Over the past week, has your wound drainage been: Moderate Mild Moderate   Rate your HS overall from 0-10 (0 = no disease, 10 = worst) over the past week:  10 9 7-8   Rate your pain score from 0-10 (0 = no disease, 10 = worst) for the most painful/symptomatic lesion in the past week:  10 - Worst Pain 8 7   Over the past week, how much has HS influenced your quality of life? - moderately moderately   Total DLQI Score 25 (extremely large effect on patient's life) - -       Assessment/Advice/instructions given to patient/guardian including prescriptions, follow up appointment or orders for diagnostic testin) Hidradenitis Suppurativa   - Will plan to start spironolactone and touch base in 12 weeks.   - Clobetasol cream for itch  - Can consider dapsone to in the future if she wants to avoid biologics. Dapsone will also potentially help with her acne.     2) Acne  - Benzoyl peroxide 5% wash   - Clindamycin lotion    3) Candida intertrigo  - Clotrimazole cream BID PRN    RTC in 12 weeks for phone visit.      Phone call contact time:  Call Started at:9:00pm  Call Ended at: 9:25pm     Timmy Franco MD, FAAD    Departments of Internal Medicine and Dermatology  [facility]  [phone]

## 2021-04-24 ENCOUNTER — HEALTH MAINTENANCE LETTER (OUTPATIENT)
Age: 36
End: 2021-04-24

## 2021-05-02 RX ORDER — BENZOYL PEROXIDE 10 G/100G
SUSPENSION TOPICAL
Qty: 187 ML | Refills: 3 | Status: SHIPPED | OUTPATIENT
Start: 2021-05-02 | End: 2021-10-28

## 2021-05-02 RX ORDER — CLINDAMYCIN PHOSPHATE 10 UG/ML
LOTION TOPICAL 2 TIMES DAILY
Qty: 60 ML | Refills: 3 | Status: SHIPPED | OUTPATIENT
Start: 2021-05-02 | End: 2021-11-03

## 2021-07-29 ENCOUNTER — VIRTUAL VISIT (OUTPATIENT)
Dept: DERMATOLOGY | Facility: CLINIC | Age: 36
End: 2021-07-29
Payer: COMMERCIAL

## 2021-07-29 DIAGNOSIS — L70.0 ACNE VULGARIS: Primary | ICD-10-CM

## 2021-07-29 PROCEDURE — 99214 OFFICE O/P EST MOD 30 MIN: CPT | Mod: 95 | Performed by: DERMATOLOGY

## 2021-07-29 RX ORDER — TRETINOIN 0.25 MG/G
CREAM TOPICAL
Qty: 45 G | Refills: 3 | Status: SHIPPED | OUTPATIENT
Start: 2021-07-29 | End: 2022-11-28

## 2021-07-29 NOTE — PATIENT INSTRUCTIONS
Increase spironolactone to 50mg BID  Adding tretinoin cream at night (can start every other night and work up to nightly)  Continue benzoyl peroxide wash daily

## 2021-07-29 NOTE — NURSING NOTE
Dermatology Rooming Note    Leonarda De Leon's goals for this visit include:   Chief Complaint   Patient presents with     Derm Problem     leonarda is having this visit today for a follow up on the HS, states that she has been having off and on flares since her visit, also has been having more drainage     Evelia Francisco CMA on 7/29/2021 at 3:05 PM

## 2021-07-29 NOTE — LETTER
7/29/2021       RE: Sophia De Leon  [address] [place] [address]     Dear Colleague,    Thank you for referring your patient, Sophia De Leon, to the [facility] DERMATOLOGY CLINIC [place] at [facility]. Please see a copy of my visit note below.    [facility] Dermatology Note  Encounter Date: Jul 29, 2021  Telephone ([phone]). Location of teledermatologist: [facility] DERMATOLOGY CLINIC [place]. Start time: 6:25 End time:6:50pm    Dermatology Problem List:  #. Nodulocystic acne  #. Hidradenitis suppurativa, Beltran stage III  Stelara approved, but patient not comfortable starting yet given ongoing other health concerns (dizziness/lightheadedness/vertigo, sore throat/GERD, palpitations)  -Current: benzoyl peroxide 2.5% wash, clobetasol 0.05% ointment BID PRN  -Past: oral antibiotics (minocycline and rifampin), Humira (migraines), isotretinoin (dryness, headaches and dizziness),  anakinra prescribed (did not start), ustekinumab (did not start)  - ILK did help the inflammatory on cheek  - Prednisone made her worse and made dizziness worse.  - Last quantiferon gold: 2018  - Hep C ab neg 11/3/20  - HIV neg 11/3/20  - Hep B: surface ab +, surface antigen neg c/w immunity 8/12/18  - CBC last wnl 11/3/20  - BMP wnl 12/27/20  #. Late phase/regressed pyogenic granuloma, R palmar 4th finger   - s/p excision with Mohs 6/25/19  #. Hx of Afib after childbirth   #. Wart, left heel  #. Hx of high-risk HPV    ___________________________________    Assessment & Plan:   1) HS/Acne  - Will plan to increase spironolactone to 100mg  - At follow-up will also consider adding dapsone vs biologic now that the ustekinumab is approved.  - Tretinoin 0.025% cream for face  - Benzoyl peroxide 5% wash   - Clindamycin lotion for flares  - Clobetasol 0.05% cream bid for itching     3) Candida intertrigo  - Clotrimazole cream BID PRN      Follow-up: 12  weeks via phone     Staff:    Timmy Franco MD, FAAD    Departments of Internal Medicine and Dermatology  [facility]  [phone]      Scribe Disclosure:  I, Zoeymicki Wilkinson, am serving as a scribe to document services personally performed by Timmy Franco MD based on data collection and the provider's statements to me.     Provider Disclosure:   The documentation recorded by the scribe accurately reflects the services I personally performed and the decisions made by me.    Timmy Franco MD, FAAD    Departments of Internal Medicine and Dermatology  [facility]  [phone]      ____________________________________________    CC: No chief complaint on file.    HPI:  Ms. Sophia De Leon is a(n) 35 year old female who presents today as a return patient for HS and acne. She started the spironolactone 50mg about 2 months ago.  She got a numb sensation when she first started it and tingling on head, but it went away. Dizziness about the same as when she started. Increased tiredness but not sure if it is related. Severe stomach pain and diarrhea when she started the med but that also resolved. When she gets itching it helps a little.    Last visit with me via telephone on 4/22/21 at which time patient was instructed to start spironolactone and clobetasol cream for itch for HS. Patient also started on BPO 5% wash and clindamycin lotion for acne. In addition, patient also started on clotrimazole cream BID PRN candida intertrigo.     EGD 5/18/21 wnl. Biopsies also normal    She continues to have shortness with breath with minimal exerrtion. Urinating more.  + blurred vision but not new.    Patient is otherwise feeling well, without additional skin concerns.    Does have a neurologist. Has upcoming vestibular therapy    Clotrimazole cream helped with both the chest and groin. The vaginal itching comes and goes. Currently clear.  She has OB/GYN.  She gets  "recurrent yeast infections.    HS Nurse Assessment    Nurse Assessment Data 12/31/2020 2/11/2021 4/22/2021   Over the past month, about how many recurrent or new boils have you had? 5 5 4   Over the past week, how many dressing changes do you do each day? 3+ 3+ 3+   Over the past week, has your wound drainage been: Moderate Mild Moderate   Rate your HS overall from 0-10 (0 = no disease, 10 = worst) over the past week:  10 9 7-8   Rate your pain score from 0-10 (0 = no disease, 10 = worst) for the most painful/symptomatic lesion in the past week:  10 - Worst Pain 8 7   Over the past week, how much has HS influenced your quality of life? - moderately moderately   Total DLQI Score 25 (extremely large effect on patient's life) - -       Medications:  Current Outpatient Medications   Medication     Acetaminophen (TYLENOL PO)     benzoyl peroxide (PANOXYL) 10 % external liquid     benzoyl peroxide 5 % external liquid     Calcium Carb-Cholecalciferol (CALCIUM/VITAMIN D PO)     clindamycin (CLEOCIN T) 1 % external lotion     clindamycin (CLINDAMAX) 1 % topical gel     clobetasol (TEMOVATE) 0.05 % external cream     clobetasol (TEMOVATE) 0.05 % external cream     clobetasol (TEMOVATE) 0.05 % external ointment     clotrimazole (LOTRIMIN) 1 % external cream     Doxycycline Hyclate 100 MG TBEC     etonogestrel-ethinyl estradiol (NUVARING) 0.12-0.015 MG/24HR vaginal ring     famotidine (PEPCID) 40 MG tablet     Gauze Pads & Dressings (TELFA NON-ADHERENT) 3\"X4\" PADS     IBUPROFEN PO     ketoconazole (NIZORAL) 2 % external shampoo     loratadine (CLARITIN) 10 MG tablet     Magnesium Oxide -Mg Supplement 250 MG PO tablet     metroNIDAZOLE (METROCREAM) 0.75 % cream     Multiple Vitamin (MULTIVITAMIN ADULT PO)     spironolactone (ALDACTONE) 50 MG tablet     tacrolimus (PROTOPIC) 0.1 % external ointment     triamcinolone (KENALOG) 0.1 % cream     Urea 40 % CREA     ustekinumab (STELARA) 90 MG/ML     Current Facility-Administered " Medications   Medication     lidocaine 1% with EPINEPHrine 1:100,000 injection 3 mL      Past Medical/Surgical History:   Patient Active Problem List   Diagnosis     Acne vulgaris     Hidradenitis suppurativa     Migraine with aura and without status migrainosus, not intractable     No past medical history on file.    CC Timmy Franco MD  [address] [place] [address] on close of this encounter.        Again, thank you for allowing me to participate in the care of your patient.      Sincerely,    Timmy Franco MD

## 2021-07-29 NOTE — PROGRESS NOTES
[facility] Dermatology Note  Encounter Date: Jul 29, 2021  Telephone ([phone]). Location of teledermatologist: [facility] [place]. Start time: 6:25 End time:6:50pm    Dermatology Problem List:  #. Nodulocystic acne  #. Hidradenitis suppurativa, Beltran stage III  Stelara approved, but patient not comfortable starting yet given ongoing other health concerns (dizziness/lightheadedness/vertigo, sore throat/GERD, palpitations)  -Current: benzoyl peroxide 2.5% wash, clobetasol 0.05% ointment BID PRN  -Past: oral antibiotics (minocycline and rifampin), Humira (migraines), isotretinoin (dryness, headaches and dizziness),  anakinra prescribed (did not start), ustekinumab (did not start)  - ILK did help the inflammatory on cheek  - Prednisone made her worse and made dizziness worse.  - Last quantiferon gold: 2018  - Hep C ab neg 11/3/20  - HIV neg 11/3/20  - Hep B: surface ab +, surface antigen neg c/w immunity 8/12/18  - CBC last wnl 11/3/20  - BMP wnl 12/27/20  #. Late phase/regressed pyogenic granuloma, R palmar 4th finger   - s/p excision with Mohs 6/25/19  #. Hx of Afib after childbirth   #. Wart, left heel  #. Hx of high-risk HPV    ___________________________________    Assessment & Plan:   1) HS/Acne  - Will plan to increase spironolactone to 100mg  - At follow-up will also consider adding dapsone vs biologic now that the ustekinumab is approved.  - Tretinoin 0.025% cream for face  - Benzoyl peroxide 5% wash   - Clindamycin lotion for flares  - Clobetasol 0.05% cream bid for itching     3) Candida intertrigo  - Clotrimazole cream BID PRN      Follow-up: 12 weeks via phone     Staff:    Timmy Franco MD, FAAD    Departments of Internal Medicine and Dermatology  [facility]  [phone]      Scribe Disclosure:  IZoey, am serving as a scribe to document services personally performed by Timmy Franco MD based on data  collection and the provider's statements to me.     Provider Disclosure:   The documentation recorded by the scribe accurately reflects the services I personally performed and the decisions made by me.    Timmy Franco MD, FAAD    Departments of Internal Medicine and Dermatology  [facility]  [phone]      ____________________________________________    CC: No chief complaint on file.    HPI:  Ms. Sophia De Leon is a(n) 35 year old female who presents today as a return patient for HS and acne. She started the spironolactone 50mg about 2 months ago.  She got a numb sensation when she first started it and tingling on head, but it went away. Dizziness about the same as when she started. Increased tiredness but not sure if it is related. Severe stomach pain and diarrhea when she started the med but that also resolved. When she gets itching it helps a little.    Last visit with me via telephone on 4/22/21 at which time patient was instructed to start spironolactone and clobetasol cream for itch for HS. Patient also started on BPO 5% wash and clindamycin lotion for acne. In addition, patient also started on clotrimazole cream BID PRN candida intertrigo.     EGD 5/18/21 wnl. Biopsies also normal    She continues to have shortness with breath with minimal exerrtion. Urinating more.  + blurred vision but not new.    Patient is otherwise feeling well, without additional skin concerns.    Does have a neurologist. Has upcoming vestibular therapy    Clotrimazole cream helped with both the chest and groin. The vaginal itching comes and goes. Currently clear.  She has OB/GYN.  She gets recurrent yeast infections.    HS Nurse Assessment    Nurse Assessment Data 12/31/2020 2/11/2021 4/22/2021   Over the past month, about how many recurrent or new boils have you had? 5 5 4   Over the past week, how many dressing changes do you do each day? 3+ 3+ 3+   Over the past week, has your wound drainage  "been: Moderate Mild Moderate   Rate your HS overall from 0-10 (0 = no disease, 10 = worst) over the past week:  10 9 7-8   Rate your pain score from 0-10 (0 = no disease, 10 = worst) for the most painful/symptomatic lesion in the past week:  10 - Worst Pain 8 7   Over the past week, how much has HS influenced your quality of life? - moderately moderately   Total DLQI Score 25 (extremely large effect on patient's life) - -       Medications:  Current Outpatient Medications   Medication     Acetaminophen (TYLENOL PO)     benzoyl peroxide (PANOXYL) 10 % external liquid     benzoyl peroxide 5 % external liquid     Calcium Carb-Cholecalciferol (CALCIUM/VITAMIN D PO)     clindamycin (CLEOCIN T) 1 % external lotion     clindamycin (CLINDAMAX) 1 % topical gel     clobetasol (TEMOVATE) 0.05 % external cream     clobetasol (TEMOVATE) 0.05 % external cream     clobetasol (TEMOVATE) 0.05 % external ointment     clotrimazole (LOTRIMIN) 1 % external cream     Doxycycline Hyclate 100 MG TBEC     etonogestrel-ethinyl estradiol (NUVARING) 0.12-0.015 MG/24HR vaginal ring     famotidine (PEPCID) 40 MG tablet     Gauze Pads & Dressings (TELFA NON-ADHERENT) 3\"X4\" PADS     IBUPROFEN PO     ketoconazole (NIZORAL) 2 % external shampoo     loratadine (CLARITIN) 10 MG tablet     Magnesium Oxide -Mg Supplement 250 MG PO tablet     metroNIDAZOLE (METROCREAM) 0.75 % cream     Multiple Vitamin (MULTIVITAMIN ADULT PO)     spironolactone (ALDACTONE) 50 MG tablet     tacrolimus (PROTOPIC) 0.1 % external ointment     triamcinolone (KENALOG) 0.1 % cream     Urea 40 % CREA     ustekinumab (STELARA) 90 MG/ML     Current Facility-Administered Medications   Medication     lidocaine 1% with EPINEPHrine 1:100,000 injection 3 mL      Past Medical/Surgical History:   Patient Active Problem List   Diagnosis     Acne vulgaris     Hidradenitis suppurativa     Migraine with aura and without status migrainosus, not intractable     No past medical history on " file.    CC Timmy Franco MD  [address] [place] [address] on close of this encounter.

## 2021-07-29 NOTE — LETTER
Date:August 18, 2021      Patient was self referred, no letter generated. Do not send.        [facility] Health Information

## 2021-10-09 ENCOUNTER — HEALTH MAINTENANCE LETTER (OUTPATIENT)
Age: 36
End: 2021-10-09

## 2021-10-28 ENCOUNTER — VIRTUAL VISIT (OUTPATIENT)
Dept: DERMATOLOGY | Facility: CLINIC | Age: 36
End: 2021-10-28
Payer: COMMERCIAL

## 2021-10-28 DIAGNOSIS — L70.0 ACNE VULGARIS: ICD-10-CM

## 2021-10-28 DIAGNOSIS — L73.2 HIDRADENITIS SUPPURATIVA: Primary | ICD-10-CM

## 2021-10-28 DIAGNOSIS — B00.1 HERPES LABIALIS: ICD-10-CM

## 2021-10-28 PROCEDURE — 99214 OFFICE O/P EST MOD 30 MIN: CPT | Mod: 95 | Performed by: DERMATOLOGY

## 2021-10-28 RX ORDER — MINOCYCLINE HYDROCHLORIDE 100 MG/1
100 TABLET ORAL 2 TIMES DAILY
Qty: 180 TABLET | Refills: 1 | Status: SHIPPED | OUTPATIENT
Start: 2021-10-28 | End: 2022-11-28

## 2021-10-28 ASSESSMENT — PAIN SCALES - GENERAL: PAINLEVEL: EXTREME PAIN (8)

## 2021-10-28 NOTE — PROGRESS NOTES
[facility] Dermatology Note  Encounter Date: Oct 28, 2021  Telephone ). Location of teledermatologist: [facility]ERMATOLOGY CLIN[facility] [place]. Start time: 8:00 End time: 8:22    Dermatology Problem List:  #. Nodulocystic acne  #. Hidradenitis suppurativa, Beltran stage II  Stelara approved, but patient not comfortable starting yet given ongoing other health concerns (dizziness/lightheadedness/vertigo, sore throat/GERD, palpitations)  -Current: benzoyl peroxide 2.5% wash, clobetasol 0.05% ointment BID PRN  -Past: oral antibiotics (minocycline and rifampin), Humira (migraines), isotretinoin (dryness, headaches and dizziness),  anakinra prescribed (did not start), ustekinumab (did not start)  - ILK did help the inflammatory on cheek  - Prednisone made her worse and made dizziness worse.  - Last quantiferon gold: 2018  - Hep C ab neg 11/3/20  - HIV neg 11/3/20  - Hep B: surface ab +, surface antigen neg c/w immunity 8/12/18  - CBC last wnl 11/3/20  - BMP wnl 12/27/20  #. Late phase/regressed pyogenic granuloma, R palmar 4th finger   - s/p excision with Mohs 6/25/19  #. Hx of Afib after childbirth   #. Wart, left heel  #. Hx of high-risk HPV    ____________________________________________    Assessment & Plan:     1) HS/Acne  - Restart minocycline 100mg BID  - Continue    Tretinoin 0.025% cream for face   - Benzoyl peroxide 5% wash    - Clindamycin lotion for flares   - Clobetasol 0.05% cream bid for itching   - Will review images and order telfa    Follow-up: RTC in 3 months via phone    Staff and Scribe:     Timmy Franco MD, FAAD, FACP     Departments of Internal Medicine and Dermatology  [facility]  [phone]  __________________    CC: No chief complaint on file.    HPI:  Ms. Sophia De Leon is a(n) 35 year old female who presents today as a return patient for follow-up. Last seen by me on 7/29/2021, at which time patient was increased to spironolactone  "100 mg and started on tretinoin 0.025% cream for treatment of HS/acne.  She felt that it got worse after starting increasing the spironolactone. She ended up stopping it due to vaginal itching and abdominal pain. She felt the abdominal pain improved after stopping. She had been on spironolactone 50mg before that. It helps acne but not HS.    Patient is otherwise feeling well, without additional skin concerns.    HS Nurse Assessment    Nurse Assessment Data 4/22/2021 7/29/2021 10/28/2021   Over the past month, about how many recurrent or new boils have you had? 4 9 9   Over the past week, how many dressing changes do you do each day? 3+ 3+ 2   Over the past week, has your wound drainage been: Moderate Moderate Moderate   Rate your HS overall from 0-10 (0 = no disease, 10 = worst) over the past week:  7-8 9 9   Rate your pain score from 0-10 (0 = no disease, 10 = worst) for the most painful/symptomatic lesion in the past week:  7 9 10 - Worst Pain   Over the past week, how much has HS influenced your quality of life? moderately moderately extremely   Total DLQI Score - - -       Medications:  Current Outpatient Medications   Medication     Acetaminophen (TYLENOL PO)     benzoyl peroxide (PANOXYL) 10 % external liquid     benzoyl peroxide 5 % external liquid     Calcium Carb-Cholecalciferol (CALCIUM/VITAMIN D PO)     clindamycin (CLEOCIN T) 1 % external lotion     clindamycin (CLINDAMAX) 1 % topical gel     clobetasol (TEMOVATE) 0.05 % external cream     clobetasol (TEMOVATE) 0.05 % external cream     clobetasol (TEMOVATE) 0.05 % external ointment     clotrimazole (LOTRIMIN) 1 % external cream     Doxycycline Hyclate 100 MG TBEC     etonogestrel-ethinyl estradiol (NUVARING) 0.12-0.015 MG/24HR vaginal ring     famotidine (PEPCID) 40 MG tablet     Gauze Pads & Dressings (TELFA NON-ADHERENT) 3\"X4\" PADS     IBUPROFEN PO     ketoconazole (NIZORAL) 2 % external shampoo     loratadine (CLARITIN) 10 MG tablet     Magnesium " Oxide -Mg Supplement 250 MG PO tablet     metroNIDAZOLE (METROCREAM) 0.75 % cream     Multiple Vitamin (MULTIVITAMIN ADULT PO)     spironolactone (ALDACTONE) 50 MG tablet     tacrolimus (PROTOPIC) 0.1 % external ointment     tretinoin (RETIN-A) 0.025 % external cream     triamcinolone (KENALOG) 0.1 % cream     Urea 40 % CREA     ustekinumab (STELARA) 90 MG/ML     Current Facility-Administered Medications   Medication     lidocaine 1% with EPINEPHrine 1:100,000 injection 3 mL      Past Medical/Surgical History:   Patient Active Problem List   Diagnosis     Acne vulgaris     Hidradenitis suppurativa     Migraine with aura and without status migrainosus, not intractable     No past medical history on file.    CC Timmy Franco MD  [address] [place] [address] on close of this encounter.

## 2021-10-28 NOTE — NURSING NOTE
Chief Complaint   Patient presents with     Derm Problem     HS follow up, stopped spironolactone--she thinks it caused an upset stomach and recurrent vaginal itching and burning      [place], EMT

## 2021-10-28 NOTE — LETTER
10/28/2021       RE: Sophia De Leon  [address] [address]     Dear Colleague,    Thank you for referring your patient, Sophia De Leon, to the [facility]ERMATOLOGY [facility] [place] at [facility]. Please see a copy of my visit note below.    [facility] Dermatology Note  Encounter Date: Oct 28, 2021  Telephone ). Location of teledermatologist: [facility] [place]. Start time: 8:00 End time: 8:22    Dermatology Problem List:  #. Nodulocystic acne  #. Hidradenitis suppurativa, Beltran stage II  Stelara approved, but patient not comfortable starting yet given ongoing other health concerns (dizziness/lightheadedness/vertigo, sore throat/GERD, palpitations)  -Current: benzoyl peroxide 2.5% wash, clobetasol 0.05% ointment BID PRN  -Past: oral antibiotics (minocycline and rifampin), Humira (migraines), isotretinoin (dryness, headaches and dizziness),  anakinra prescribed (did not start), ustekinumab (did not start)  - ILK did help the inflammatory on cheek  - Prednisone made her worse and made dizziness worse.  - Last quantiferon gold: 2018  - Hep C ab neg 11/3/20  - HIV neg 11/3/20  - Hep B: surface ab +, surface antigen neg c/w immunity 8/12/18  - CBC last wnl 11/3/20  - BMP wnl 12/27/20  #. Late phase/regressed pyogenic granuloma, R palmar 4th finger   - s/p excision with Mohs 6/25/19  #. Hx of Afib after childbirth   #. Wart, left heel  #. Hx of high-risk HPV    ____________________________________________    Assessment & Plan:     1) HS/Acne  - Restart minocycline 100mg BID  - Continue    Tretinoin 0.025% cream for face   - Benzoyl peroxide 5% wash    - Clindamycin lotion for flares   - Clobetasol 0.05% cream bid for itching   - Will review images and order telfa    Follow-up: RTC in 3 months via phone    Staff and Scribe:     Timmy Franco MD, FAAD, FACP     Departments of  Internal Medicine and Dermatology  [facility]  [phone]  __________________    CC: No chief complaint on file.    HPI:  Ms. Sophia De Leon is a(n) 35 year old female who presents today as a return patient for follow-up. Last seen by me on 7/29/2021, at which time patient was increased to spironolactone 100 mg and started on tretinoin 0.025% cream for treatment of HS/acne.  She felt that it got worse after starting increasing the spironolactone. She ended up stopping it due to vaginal itching and abdominal pain. She felt the abdominal pain improved after stopping. She had been on spironolactone 50mg before that. It helps acne but not HS.    Patient is otherwise feeling well, without additional skin concerns.    HS Nurse Assessment    Nurse Assessment Data 4/22/2021 7/29/2021 10/28/2021   Over the past month, about how many recurrent or new boils have you had? 4 9 9   Over the past week, how many dressing changes do you do each day? 3+ 3+ 2   Over the past week, has your wound drainage been: Moderate Moderate Moderate   Rate your HS overall from 0-10 (0 = no disease, 10 = worst) over the past week:  7-8 9 9   Rate your pain score from 0-10 (0 = no disease, 10 = worst) for the most painful/symptomatic lesion in the past week:  7 9 10 - Worst Pain   Over the past week, how much has HS influenced your quality of life? moderately moderately extremely   Total DLQI Score - - -       Medications:  Current Outpatient Medications   Medication     Acetaminophen (TYLENOL PO)     benzoyl peroxide (PANOXYL) 10 % external liquid     benzoyl peroxide 5 % external liquid     Calcium Carb-Cholecalciferol (CALCIUM/VITAMIN D PO)     clindamycin (CLEOCIN T) 1 % external lotion     clindamycin (CLINDAMAX) 1 % topical gel     clobetasol (TEMOVATE) 0.05 % external cream     clobetasol (TEMOVATE) 0.05 % external cream     clobetasol (TEMOVATE) 0.05 % external ointment     clotrimazole (LOTRIMIN) 1 % external cream      "Doxycycline Hyclate 100 MG TBEC     etonogestrel-ethinyl estradiol (NUVARING) 0.12-0.015 MG/24HR vaginal ring     famotidine (PEPCID) 40 MG tablet     Gauze Pads & Dressings (TELFA NON-ADHERENT) 3\"X4\" PADS     IBUPROFEN PO     ketoconazole (NIZORAL) 2 % external shampoo     loratadine (CLARITIN) 10 MG tablet     Magnesium Oxide -Mg Supplement 250 MG PO tablet     metroNIDAZOLE (METROCREAM) 0.75 % cream     Multiple Vitamin (MULTIVITAMIN ADULT PO)     spironolactone (ALDACTONE) 50 MG tablet     tacrolimus (PROTOPIC) 0.1 % external ointment     tretinoin (RETIN-A) 0.025 % external cream     triamcinolone (KENALOG) 0.1 % cream     Urea 40 % CREA     ustekinumab (STELARA) 90 MG/ML     Current Facility-Administered Medications   Medication     lidocaine 1% with EPINEPHrine 1:100,000 injection 3 mL      Past Medical/Surgical History:   Patient Active Problem List   Diagnosis     Acne vulgaris     Hidradenitis suppurativa     Migraine with aura and without status migrainosus, not intractable     No past medical history on file.    CC Timmy Franco MD  [address] [place] [address] on close of this encounter.      Again, thank you for allowing me to participate in the care of your patient.      Sincerely,    Timmy Franco MD      "

## 2021-10-28 NOTE — LETTER
Date:November 6, 2021      Patient was self referred, no letter generated. Do not send.        [facility] Health Information

## 2021-11-03 RX ORDER — CLINDAMYCIN PHOSPHATE 10 UG/ML
LOTION TOPICAL 2 TIMES DAILY
Qty: 60 ML | Refills: 3 | Status: SHIPPED | OUTPATIENT
Start: 2021-11-03 | End: 2023-06-05

## 2021-11-03 RX ORDER — CLOBETASOL PROPIONATE 0.5 MG/G
OINTMENT TOPICAL
Qty: 60 G | Refills: 1 | Status: SHIPPED | OUTPATIENT
Start: 2021-11-03 | End: 2022-03-29

## 2021-12-10 ENCOUNTER — LAB REQUISITION (OUTPATIENT)
Dept: LAB | Facility: CLINIC | Age: 36
End: 2021-12-10
Payer: COMMERCIAL

## 2021-12-10 PROCEDURE — U0005 INFEC AGEN DETEC AMPLI PROBE: HCPCS | Mod: ORL | Performed by: PEDIATRICS

## 2021-12-12 LAB — SARS-COV-2 RNA RESP QL NAA+PROBE: NEGATIVE

## 2022-01-27 ENCOUNTER — VIRTUAL VISIT (OUTPATIENT)
Dept: DERMATOLOGY | Facility: CLINIC | Age: 37
End: 2022-01-27
Payer: COMMERCIAL

## 2022-01-27 DIAGNOSIS — L73.2 HIDRADENITIS SUPPURATIVA: Primary | ICD-10-CM

## 2022-01-27 PROCEDURE — 99213 OFFICE O/P EST LOW 20 MIN: CPT | Mod: 95 | Performed by: DERMATOLOGY

## 2022-01-27 NOTE — LETTER
Date:January 28, 2022      Patient was self referred, no letter generated. Do not send.        [facility] Health Information

## 2022-01-27 NOTE — PROGRESS NOTES
[facility] Dermatology Note  Encounter Date: Jan 27, 2022  Telephone ([phone]). Location of teledermatologist: [facility]ERMATOLOGY CLIN[facility] [place]. Start time: 7:11. End time: 7:42    Dermatology Problem List:  #. Nodulocystic acne  #. Hidradenitis suppurativa, Beltran stage III  Stelara approved, but patient not comfortable starting yet given ongoing other health concerns (dizziness/lightheadedness/vertigo, sore throat/GERD, palpitations)  -Current: benzoyl peroxide 2.5% wash, clobetasol 0.05% ointment BID PRN  -Past: oral antibiotics (minocycline and rifampin), Humira (migraines), isotretinoin (dryness, headaches and dizziness),  anakinra prescribed (did not start), ustekinumab (did not start)  - ILK did help the inflammatory on cheek  - Prednisone made her worse and made dizziness worse.  - Last quantiferon gold: 2018  - Hep C ab neg 11/3/20  - HIV neg 11/3/20  - Hep B: surface ab +, surface antigen neg c/w immunity 8/12/18  - CBC last wnl 11/3/20  - BMP wnl 12/27/20  #. 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 Hx of Afib after childbirth   #. Wart, left heel  #. Hx of high-risk HPV    ____________________________________________    Assessment & Plan:     # HS/Acne  - Will try sarna  - Tretinoin 0.025% cream for face  - Benzoyl peroxide 5% wash   - Clindamycin lotion for flares  - Clobetasol 0.05% ointment bid for itching (warned about skin thinning)  - Will see if we can get nd-yag covered by insurance      Staff:     Timmy Franco MD, FAAD, FACP     Departments of Internal Medicine and Dermatology  [facility]  [phone]    ____________________________________________    CC:      HPI:  Ms. Sophia De Leon is a(n) 36 year old female who presents today as a return patient for follow-up HS.   The patient was last seen in dermatology on 10/28/21 by myself at which time spironolactone was stopped  "and restarted minocycline.  Additionally, she was continued on tretinoin 0.025% cream for face every few weeks, BPO 5% wash, clindamycin lotion for flares, and clobetasol 0.05% cream BID for itching.  Minocycline helping acne but not HS. She was getting vaginal yeast infections. We tried the clotrimazole for the skin, but it turned out to be a vaginal problem and her OB started pulsed fluconazole weekly.  Clobetasol ointment 1-2 x a day.     Painful on ribs. Been there for 1 month. Pain with movement. Seems random. No pain when pushing on it, but when muscle activate dit is tender.    Started having bad tooth pain today. Taking ibuprofen and has to schedule with dentist.,    HS Nurse Assessment    Nurse Assessment Data 7/29/2021 10/28/2021 1/27/2022   Over the past 30 days how many old lesions flared back up? - - 3   Over the past 30 days how many new lesions did you get? 9 9 3   Over the past week, how many dressing changes do you do each day? 3+ 2 2   Over the past week, has your wound drainage been: Moderate Moderate Moderate   Rate your HS overall from 0 - 10 (0 = no disease, 10 = worst) over the past week:  9 9 7-8   Rate your pain score from 0 - 10 (0 = no disease, 10 = worst) for the most painful/symptomatic lesion in the past week:  9 10 - Worst Pain 8   Over the past week, how much has HS influenced your quality of life? moderately extremely very much   Total DLQI Score - - -       Medications:  Current Outpatient Medications   Medication     Acetaminophen (TYLENOL PO)     benzoyl peroxide 5 % external liquid     Calcium Carb-Cholecalciferol (CALCIUM/VITAMIN D PO)     clindamycin (CLEOCIN T) 1 % external lotion     clobetasol (TEMOVATE) 0.05 % external ointment     etonogestrel-ethinyl estradiol (NUVARING) 0.12-0.015 MG/24HR vaginal ring     famotidine (PEPCID) 40 MG tablet     Gauze Pads & Dressings (TELFA NON-ADHERENT) 3\"X4\" PADS     IBUPROFEN PO     ketoconazole (NIZORAL) 2 % external shampoo     " loratadine (CLARITIN) 10 MG tablet     Magnesium Oxide -Mg Supplement 250 MG PO tablet     metroNIDAZOLE (METROCREAM) 0.75 % cream     minocycline (DYNACIN) 100 MG tablet     Multiple Vitamin (MULTIVITAMIN ADULT PO)     tacrolimus (PROTOPIC) 0.1 % external ointment     tretinoin (RETIN-A) 0.025 % external cream     triamcinolone (KENALOG) 0.1 % cream     Urea 40 % CREA     ustekinumab (STELARA) 90 MG/ML     Current Facility-Administered Medications   Medication     lidocaine 1% with EPINEPHrine 1:100,000 injection 3 mL      Past Medical/Surgical History:   Patient Active Problem List   Diagnosis     Acne vulgaris     Hidradenitis suppurativa     Migraine with aura and without status migrainosus, not intractable     No past medical history on file.    CC Timmy Franco MD  [address] [place] [address] on close of this encounter.

## 2022-01-27 NOTE — LETTER
1/27/2022       RE: Sophia De Leon  [address] [address]     Dear Colleague,    Thank you for referring your patient, Sophia De Leon, to the [facility] [place] [facility]. Please see a copy of my visit note below.    [facility] Dermatology Note  Encounter Date: Jan 27, 2022  Telephone ([phone]). Location of teledermatologist: [facility] [place]. Start time: 7:11. End time: 7:42    Dermatology Problem List:  #. Nodulocystic acne  #. Hidradenitis suppurativa, Beltran stage III  Stelara approved, but patient not comfortable starting yet given ongoing other health concerns (dizziness/lightheadedness/vertigo, sore throat/GERD, palpitations)  -Current: benzoyl peroxide 2.5% wash, clobetasol 0.05% ointment BID PRN  -Past: oral antibiotics (minocycline and rifampin), Humira (migraines), isotretinoin (dryness, headaches and dizziness),  anakinra prescribed (did not start), ustekinumab (did not start)  - ILK did help the inflammatory on cheek  - Prednisone made her worse and made dizziness worse.  - Last quantiferon gold: 2018  - Hep C ab neg 11/3/20  - HIV neg 11/3/20  - Hep B: surface ab +, surface antigen neg c/w immunity 8/12/18  - CBC last wnl 11/3/20  - BMP wnl 12/27/20  #. 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 Hx of Afib after childbirth   #. Wart, left heel  #. Hx of high-risk HPV    ____________________________________________    Assessment & Plan:     # HS/Acne  - Will try sarna  - Tretinoin 0.025% cream for face  - Benzoyl peroxide 5% wash   - Clindamycin lotion for flares  - Clobetasol 0.05% ointment bid for itching (warned about skin thinning)  - Will see if we can get nd-yag covered by insurance      Staff:     Timmy Franco MD, FAAD, FACP     Departments of Internal Medicine and  Dermatology  [facility]  [phone]    ____________________________________________    CC:      HPI:  Ms. Sophia De Leon is a(n) 36 year old female who presents today as a return patient for follow-up HS.   The patient was last seen in dermatology on 10/28/21 by myself at which time spironolactone was stopped and restarted minocycline.  Additionally, she was continued on tretinoin 0.025% cream for face every few weeks, BPO 5% wash, clindamycin lotion for flares, and clobetasol 0.05% cream BID for itching.  Minocycline helping acne but not HS. She was getting vaginal yeast infections. We tried the clotrimazole for the skin, but it turned out to be a vaginal problem and her OB started pulsed fluconazole weekly.  Clobetasol ointment 1-2 x a day.     Painful on ribs. Been there for 1 month. Pain with movement. Seems random. No pain when pushing on it, but when muscle activate dit is tender.    Started having bad tooth pain today. Taking ibuprofen and has to schedule with dentist.,    HS Nurse Assessment    Nurse Assessment Data 7/29/2021 10/28/2021 1/27/2022   Over the past 30 days how many old lesions flared back up? - - 3   Over the past 30 days how many new lesions did you get? 9 9 3   Over the past week, how many dressing changes do you do each day? 3+ 2 2   Over the past week, has your wound drainage been: Moderate Moderate Moderate   Rate your HS overall from 0 - 10 (0 = no disease, 10 = worst) over the past week:  9 9 7-8   Rate your pain score from 0 - 10 (0 = no disease, 10 = worst) for the most painful/symptomatic lesion in the past week:  9 10 - Worst Pain 8   Over the past week, how much has HS influenced your quality of life? moderately extremely very much   Total DLQI Score - - -       Medications:  Current Outpatient Medications   Medication     Acetaminophen (TYLENOL PO)     benzoyl peroxide 5 % external liquid     Calcium Carb-Cholecalciferol (CALCIUM/VITAMIN D PO)     clindamycin  "(CLEOCIN T) 1 % external lotion     clobetasol (TEMOVATE) 0.05 % external ointment     etonogestrel-ethinyl estradiol (NUVARING) 0.12-0.015 MG/24HR vaginal ring     famotidine (PEPCID) 40 MG tablet     Gauze Pads & Dressings (TELFA NON-ADHERENT) 3\"X4\" PADS     IBUPROFEN PO     ketoconazole (NIZORAL) 2 % external shampoo     loratadine (CLARITIN) 10 MG tablet     Magnesium Oxide -Mg Supplement 250 MG PO tablet     metroNIDAZOLE (METROCREAM) 0.75 % cream     minocycline (DYNACIN) 100 MG tablet     Multiple Vitamin (MULTIVITAMIN ADULT PO)     tacrolimus (PROTOPIC) 0.1 % external ointment     tretinoin (RETIN-A) 0.025 % external cream     triamcinolone (KENALOG) 0.1 % cream     Urea 40 % CREA     ustekinumab (STELARA) 90 MG/ML     Current Facility-Administered Medications   Medication     lidocaine 1% with EPINEPHrine 1:100,000 injection 3 mL      Past Medical/Surgical History:   Patient Active Problem List   Diagnosis     Acne vulgaris     Hidradenitis suppurativa     Migraine with aura and without status migrainosus, not intractable     No past medical history on file.    CC Timmy Franco MD  [address] [place] [address] on close of this encounter.      Again, thank you for allowing me to participate in the care of your patient.      Sincerely,    Timmy Franco MD      "

## 2022-01-27 NOTE — NURSING NOTE
Dermatology Rooming Note    Sophia De Leon's goals for this visit include:   Chief Complaint   Patient presents with     Derm Problem     HS follow, flaring since last visit, have not improved     Evelia Francisco CMA on 1/27/2022 at 3:03 PM

## 2022-01-28 NOTE — PATIENT INSTRUCTIONS
- Try sarna or vics vapor rub  - Tretinoin 0.025% cream for face every 3 nights  - Benzoyl peroxide 5% wash every other day  - Clindamycin lotion for flares  - Clobetasol 0.05% ointment bid for itching. Be careful about skin thinning.  - Will see if we can get nd-yag covered by insurance  - Sent compounded pain med

## 2022-02-01 ENCOUNTER — TELEPHONE (OUTPATIENT)
Dept: DERMATOLOGY | Facility: CLINIC | Age: 37
End: 2022-02-01
Payer: COMMERCIAL

## 2022-02-01 NOTE — LETTER
"February 1, 2022      Sophia De Leon  [address]  [place] [address]            We are writing to request coverage of long-pulsed 1064-nm Nd: YAG laser treatment for recalcitrant hidradenitis suppurativa with significant effect on quality of life. This patient has symptoms of including inflammatory nodules, abscesses and intermittent draining tunnels in the groin.     Hidradenitis responds well to ND-YAG laser hair removal as it is a follicular process. We would expect 1 treatment every 3- 6 weeks for a maximum of 12 treatments.     The CPT Code Description utilized would be 29849: Unlisted procedure, skin, mucous membrane and subcutaneous tissue.  The approximate cost is $275.00 per treatment. The only FDA covered treatment for this condition is weekly adalimumab. In addition to being more invasive, the cost of this intervention is much larger.     Finally, this treatment has been recommended in some situations by the North American HS Management Guidelines (J Am Acad Dermatol. 2020 Nov;83(5):3554-1954)     Please, see the following references:   Giselle Marin et al. \"Randomized control trial for the treatment of hidradenitis suppurativa with a neodymium?doped yttrium aluminium garnet laser.\" Dermatologic surgery 35.8 (2009): 8610-7042.     Haven Ruelas, et al. \"Histopathologic study of hidradenitis suppurativa following long-pulsed 1064-nm Nd: YAG laser treatment.\" Archives of dermatology 147.1 (2011): 21-28.   Sage Pritchett, et al. \"Prospective controlled clinical and histopathologic study of hidradenitis suppurativa treated with the long-pulsed neodymium: yttrium-aluminium-garnet laser.\" Journal of the American Academy of Dermatology 62.4 (2010): 637-645.     We strive to provide our patient with outstanding care. Therefore, I request you please contact me at [phone] if you have any questions regarding coverage or our treatment rational.     Timmy Franco MD, FAAD, FACP    "   Department of Dermatology   [facility]   Phone([facility]): [phone]            Sincerely,      Timmy Frnaco MD, FAAD, FACP

## 2022-02-01 NOTE — TELEPHONE ENCOUNTER
Financial Counselor Review:    Please submit letter of medical necessity from Dr. Franco dated 02/01/2022 to insurance.  Procedure to be performed (include CPT code):  Laser hair removal, 64359  Diagnosis:  Hidradenitis Suppurativa  ICD-10 code:  L73.2  # of treatments requested:  12  Sites: Groin

## 2022-02-02 NOTE — TELEPHONE ENCOUNTER
Patient spoke with a representative at [facility] who was able to relay to patient that the CPT 02826 does not require authorization.  They were not able to tell the patient whether it would be covered for her specific diagnosis L73.2.  She will return a call to her insurance and ask again about her coverage, copayments and deductibles for this procedure.    She will call back to discuss NdYag prep and schedule if she would like to proceed.

## 2022-02-02 NOTE — TELEPHONE ENCOUNTER
Patient scheduled for March 29th at 9:45 am and numbing with the nurse at 9:15 am. Patient confirmed understanding verbally and had no further questions or concerns.     Kim Huber LPN

## 2022-02-02 NOTE — TELEPHONE ENCOUNTER
Patient returned call and would like to contact her insurance in regards to coverage. Patient will contact clinic back if she would like to schedule for LHR. Please review prep instructions if patient would like to schedule.     Marla Weston LPN

## 2022-02-02 NOTE — TELEPHONE ENCOUNTER
PB DOS: TBD  Type of Procedure: LHR  CPT Codes: 17999 x 12  ICD10 Codes: L73.2  Site: Groin  Surgeon/Ordering provider: Timmy Franco [id number]  Pre-cert/Authorization completed:  PA not required   Payer: Dutch Blandon   Spoke to [facility] PA list     Please advise patient to contact insurance for benefit and coverage details.

## 2022-02-02 NOTE — TELEPHONE ENCOUNTER
I left a message for patient to call [facility].    No PA requires for NdYag laser for HS.  Patient should contact her insurance to check coverage.  Provide her with CPT code 13121 and ICD10 code L73.2.    If it's not covered, the out of pocket cost is $275 per treatment.  Payment would be due on the day of service.  If patient would like to schedule ok to schedule laser with Dr. Shepard or a consult first with Dr. Jordan.    Prep instructions:  1. Shave night before or morning of procedure.  2. Avoid plucking, waxing, or electrolysis 4 weeks before.  3. Strict sun avoidance 6 weeks prior to and 6 weeks after treatment.    Sophie Restrepo RN

## 2022-03-28 ENCOUNTER — TELEPHONE (OUTPATIENT)
Dept: DERMATOLOGY | Facility: CLINIC | Age: 37
End: 2022-03-28
Payer: COMMERCIAL

## 2022-03-28 NOTE — TELEPHONE ENCOUNTER
Nurse returned patients call to reschedule, pt informed writer that she found a  and she will be able to make her appointment with Dr. Shepard. Patient had no further questions or concerns.     Kim Huber LPN

## 2022-03-28 NOTE — TELEPHONE ENCOUNTER
[facility] Call Center    Phone Message    May a detailed message be left on voicemail: yes     Reason for Call: Other: Pt called and wanted to know what is the next opening for the procedure. Pt might not have sitters for tomorrow. Pt wanted to keep her appt until she hears back from the care team. Please call her back. Thanks    Action Taken: Message routed to:  Adult Clinics: Dermatology p [phone]    Travel Screening: Not Applicable

## 2022-03-29 ENCOUNTER — OFFICE VISIT (OUTPATIENT)
Dept: DERMATOLOGY | Facility: CLINIC | Age: 37
End: 2022-03-29
Payer: COMMERCIAL

## 2022-03-29 DIAGNOSIS — L73.2 HIDRADENITIS SUPPURATIVA: Primary | ICD-10-CM

## 2022-03-29 PROCEDURE — 17999 UNLISTD PX SKN MUC MEMB SUBQ: CPT | Performed by: DERMATOLOGY

## 2022-03-29 RX ORDER — OMEPRAZOLE 10 MG/1
20 CAPSULE, DELAYED RELEASE ORAL DAILY
COMMUNITY
End: 2022-11-28

## 2022-03-29 NOTE — PATIENT INSTRUCTIONS
Gentle Max Hidradenitis Instructions    Laser : I will have redness, pain and swelling. I may have skin discoloration, bruising and itching. Risks are permenant discoloration, hives, infection scarring, eye injury,hair growth, and blister. The outcome could be no improvement or slight improvement. Multiple treatments are required. All hair will not be removed.     Before the procedure:  Sun Protection:  Do not allow the area to become tan or come in with a tan for a treatment. Tans will increased the risks of the procedure. Do not use spray or any over counter product self tanners prior to the procedure.     Shaving:   Gently shave the area the evening before the procedure.     Other:  Avoid any retinols such as Differin, adapalene, retinol or tretinoin to the treated area 1 week prior. Hold bleaching creams such as hydroquinone until 1 week prior.  Avoid any hair removal procedures such as waxing, electrolysis or other lasers on the skin within 6 weeks prior. Do not have any other cosmetic procedures done on the area within the prior 6 weeks such as chemical peels or dermabrasion.      Post Procedure:  Day 1-7  The healing time for any given treatment varies between clients. The following represents the general recovery phases you might expect. Individual clients may experience variations from this course.     Swelling/Discomfort/Redness:  The most common side effects are erythema and edema (redness and swelling) which generally occur immediately after and typically resolve within 48 hours. If crusting and blistering occurs call the clinic.     Activity:  Avoid swimming the day of laser hair removal treatment. Also, no rigorous exercise the day of treatment.     Moisturizers and Sunscreens:  Wait until the skin has returned to normal to start topical products.     Sun Protection:  Do not allow the area to become tan or come in with a tan for a treatment. Tans will increased the risks of the procedure. Do not use  spray or any over counter product self tanners prior to the procedure.     Warning signs:  Call the clinic if you have significant pain, increasing redness, pus, blisters/crusting or for any other concerns.     Who should I call with questions?    [facility]: [phone]    [facility]: [phone]    For urgent needs outside of business hours call the [facility] at [phone] and ask for the dermatology resident on call    Mychart messaging response may be delayed by several days

## 2022-03-29 NOTE — PROGRESS NOTES
Laser Hair Removal Gentle Max Prox (1064nm)  Procedure Date: Mar 29, 2022    Staff Surgeon: Danielle Shepard MD    Resident Surgeon: none    Assistant: Kim Huber LPN    Trimming required prior to treatment in clinic?: Yes, patient shaving prior.   Shepherd skin type: III  Diagnosis/Treatment Reason: Hidradenitis supparativa    Interval history:  On minocycline, minimal improvement. Has had dizziness, complicated with chronic vertigo. Had issues with throat and chest pain on doxycycline. Last period on     Will contact Dr. Franco given dizziness while on mincycycline, chest pain on doxycycline.    Treatment#: 1     Wavelength(755/1064nm):1064  Location: mons and groin  Fluence: 45  Spot size: 10  Pulse width:20 mS ( mS)   Total Number of Pulses: 32  Dynamic cooling spray settin mS  Dynamic cooling device delay:  30 mS      Anesthesia:  See nursing record, topical    Dixie used?:No  Ice used?: No    Description of Operation/Procedure:   The nature and purpose of the procedure, associated risks, possible consequences, complications and alternative methods of treatment were explained in detail, this includes but is not limited to hyperpigmentation, hypopigmentation, scarring, bruising, hair loss pain/discomfort, eye injury, paradoxical hair growth, hives, infection, itching  and blister.   We reviewed that the outcome could be any of the following: no improvement, slight improvement or change in skin color & texture, the skin might be permanently lighter or darker, and though uncommon, superficial scarring may occur.  Multiple treatments are required. We reviewed with patients that laser hair removal is a reduction in hair and not permanent. The risks were again reviewed including skin hyperpigmentation, hypopigmentation, scarring, bruising blistering. Reviewed white and red hair do not respond. Reviewed hair may regrow as it is not permanent but a reduction. Reviewed risks of paradoxical  hypertrichosis. Patient has been offered electrolysis as alternative.  Reviewed that a 3 months waiting period prior to surgery to recommended. Reviewed that the patient can terminate the procedure at anytime.      A photo and operative consent were obtained. Time-out was performed.  The patient was positioned to optimally expose the area treated. Dynamic cooling was verified and functioning properly.  Protective eyewear was worn by the patient and goggles on all personnel in the treatment room.  The patient confirmed the site to be treated. The laser energy output was verified by meter reading.  N95 and buffalo filtration was used.     The clinically evident lesion(s) was/were treated with laser beam as above with 1 pass.  The patient tolerated the procedure well and no complications were noted. Post operative instructions were provided. The total laser operation and preparation time was approximately 60minutes.  The patient was counseled to contact us immediately for any concerns. The patient was offered and recommended to read their after visit summary.     We reviewed the need to use the phone for any emergencies due the the mychart delay     The patient will follow-up in 4-12 weeks      Staff Involved:  Scribe/Staff      Scribe Disclosure:   Romario GARCIA, am serving as a scribe to document services personally performed by this physician, Dr. Danielle Shepard, based on data collection and the provider's statements to me.     Provider Disclosure:   The documentation recorded by the scribe accurately reflects the services I personally performed and the decisions made by me.    Danielle Shepard MD    Department of Dermatology  [facility]: Phone: [phone], Fax:[phone]  [facility]: Phone: [phone], Fax: [phone]

## 2022-03-29 NOTE — NURSING NOTE
Sophia De Leon's goals for this visit include:   Chief Complaint   Patient presents with     Laser Treatment     LHR       She requests these members of her care team be copied on today's visit information: no    PCP: No Ref-Primary, Physician    Referring Provider:  No referring provider defined for this encounter.    There were no vitals taken for this visit.    Do you need any medication refills at today's visit? No    Dermatology Laser Intake Checklist:  History of psoriasis: No  History of recent tan, indoor or outdoor tanning/vacation or other sun exposure: No  History of vitiligo: No  Family history of vitiligo: No  Recent other cosmetic procedure(microderm abrasion/peel/hair removal/facial etc): No  History of HSV: No  Did the patient start valtrex: No  For genital laser hair removal patient only: Is there a history of genital warts or condyloma: No  Tattoo in the area to be treated: No  Is patient using hydroquinone: No  Retinoids and other acne medications stopped for 2 weeks: No  Has the patient had accutane in the last 6-12 months: No  Pregnant or breastfeeding: No  History of skin cancer in area planned for treatment: No  History of treatment with gold: No  Changes in medical history: No  Photos obtained: No  Does the patient smoke: No  Is the patient on ibuprofen/aspirin/plavix/coumadin/other blood thinner: No  If patient is taking narcotic or diazepam(valium)-does patient have : No  There were no vitals taken for this visit.    Pat Alonzo LPN

## 2022-03-29 NOTE — LETTER
3/29/2022         RE: Sophia De Leon  [address] [address]        Dear Colleague,    Thank you for referring your patient, Sophia De Leon, to the [facility]. Please see a copy of my visit note below.    Laser Hair Removal Gentle Max Prox (1064nm)  Procedure Date: Mar 29, 2022    Staff Surgeon: Danielle Shepard MD    Resident Surgeon: none    Assistant: Kim Huber LPN    Trimming required prior to treatment in clinic?: Yes, patient shaving prior.   Shepherd skin type: III  Diagnosis/Treatment Reason: Hidradenitis supparativa    Interval history:  On minocycline, minimal improvement. Has had dizziness, complicated with chronic vertigo. Had issues with throat and chest pain on doxycycline. Last period on     Will contact Dr. Franco given dizziness while on mincycycline, chest pain on doxycycline.    Treatment#: 1     Wavelength(755/1064nm):1064  Location: mons and groin  Fluence: 45  Spot size: 10  Pulse width:20 mS ( mS)   Total Number of Pulses: 32  Dynamic cooling spray settin mS  Dynamic cooling device delay:  30 mS      Anesthesia:  See nursing record, topical    Dixie used?:No  Ice used?: No    Description of Operation/Procedure:   The nature and purpose of the procedure, associated risks, possible consequences, complications and alternative methods of treatment were explained in detail, this includes but is not limited to hyperpigmentation, hypopigmentation, scarring, bruising, hair loss pain/discomfort, eye injury, paradoxical hair growth, hives, infection, itching  and blister.   We reviewed that the outcome could be any of the following: no improvement, slight improvement or change in skin color & texture, the skin might be permanently lighter or darker, and though uncommon, superficial scarring may occur.  Multiple treatments are required. We reviewed with patients that laser hair removal is a reduction in hair and not permanent. The risks were  again reviewed including skin hyperpigmentation, hypopigmentation, scarring, bruising blistering. Reviewed white and red hair do not respond. Reviewed hair may regrow as it is not permanent but a reduction. Reviewed risks of paradoxical hypertrichosis. Patient has been offered electrolysis as alternative.  Reviewed that a 3 months waiting period prior to surgery to recommended. Reviewed that the patient can terminate the procedure at anytime.      A photo and operative consent were obtained. Time-out was performed.  The patient was positioned to optimally expose the area treated. Dynamic cooling was verified and functioning properly.  Protective eyewear was worn by the patient and goggles on all personnel in the treatment room.  The patient confirmed the site to be treated. The laser energy output was verified by meter reading.  N95 and buffalo filtration was used.     The clinically evident lesion(s) was/were treated with laser beam as above with 1 pass.  The patient tolerated the procedure well and no complications were noted. Post operative instructions were provided. The total laser operation and preparation time was approximately 60minutes.  The patient was counseled to contact us immediately for any concerns. The patient was offered and recommended to read their after visit summary.     We reviewed the need to use the phone for any emergencies due the the mychart delay     The patient will follow-up in 4-12 weeks      Staff Involved:  Scribe/Staff      Scribe Disclosure:   I, Romario Villalta, am serving as a scribe to document services personally performed by this physician, Dr. Danielle Shepard, based on data collection and the provider's statements to me.     Provider Disclosure:   The documentation recorded by the scribe accurately reflects the services I personally performed and the decisions made by me.    Danielle Shepard MD    Department of Dermatology  [facility]  [facility]: Phone: [phone], Fax:[phone]  [facility]: Phone: [phone], Fax: [phone]                       Again, thank you for allowing me to participate in the care of your patient.        Sincerely,        Danielle Shepard MD

## 2022-03-29 NOTE — NURSING NOTE
LMX applied to the groin area inguinal creases.    LMX  ND[facility]: 67630-650-75  Exp. 09/2023    Pat Alonzo LPN

## 2022-04-22 ENCOUNTER — MYC MEDICAL ADVICE (OUTPATIENT)
Dept: DERMATOLOGY | Facility: CLINIC | Age: 37
End: 2022-04-22
Payer: COMMERCIAL

## 2022-04-25 ENCOUNTER — VIRTUAL VISIT (OUTPATIENT)
Dept: DERMATOLOGY | Facility: CLINIC | Age: 37
End: 2022-04-25
Payer: COMMERCIAL

## 2022-04-25 DIAGNOSIS — L73.2 HIDRADENITIS SUPPURATIVA: Primary | ICD-10-CM

## 2022-04-25 PROCEDURE — 99214 OFFICE O/P EST MOD 30 MIN: CPT | Mod: 95 | Performed by: DERMATOLOGY

## 2022-04-25 ASSESSMENT — PATIENT HEALTH QUESTIONNAIRE - PHQ9: SUM OF ALL RESPONSES TO PHQ QUESTIONS 1-9: 21

## 2022-04-25 NOTE — PROGRESS NOTES
[facility] Dermatology Note  Encounter Date: Apr 25, 2022  Telephone and photos ([phone] ). Location of teledermatologist: [facility]. Start time: 11:23am. End time: 11:48am.    Dermatology Problem List:  #. Nodulocystic acne  #. Hidradenitis suppurativa, Beltran stage III  Stelara approved, but patient not comfortable starting yet given ongoing other health concerns (dizziness/lightheadedness/vertigo, sore throat/GERD, palpitations)  -Current: benzoyl peroxide 2.5% wash, clobetasol 0.05% ointment BID PRN  -Past: oral antibiotics (minocycline and rifampin), Humira (migraines), isotretinoin (dryness, headaches and dizziness),  anakinra prescribed (did not start), ustekinumab (did not start)  - ILK did help the inflammatory on cheek  - Prednisone made her worse and made dizziness worse.  - Last quantiferon gold: 2018  - Hep C ab neg 11/3/20  - HIV neg 11/3/20  - Hep B: surface ab +, surface antigen neg c/w immunity 8/12/18  - CBC last wnl 11/3/20  - BMP wnl 12/27/20  #. 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 Hx of Afib after childbirth   #. Wart, left heel  #. Hx of high-risk HPV     ____________________________________________     Assessment & Plan:      # HS/Acne - with flare today, call GI, get okay to continue minocycline.    - Tretinoin 0.025% cream for face  - Benzoyl peroxide 5%/clindamycin should be started   - Ndyag- she would like to repeat, will add ice to LMX- reviewed the  HS laser is antiinflammatory, hair may be removed. Review she may terminate at a tune,   - she will continue minocycline only if her GI doctor says it is okay   Per Dr. Franco, clobetasol for sarna itch, no change not addressed  -KAUSHIK for GI records   -reviewed inflammatory bowel disease risks    ADDENDUM 4/25/2022: I called patient as rooming note demonstrates and elevated PHq9. I could not reach patient but no suicidal ideation noted  on testing. Will have RN reach out to make sure resources are in place with mental health     Procedures Performed:    None    Follow-up: offered this week for injection and declined, she will call if she changes her mind. We will try to help her get okay from GI. She will check with insurance.   Staff:     Danielle Shepard MD    Department of Dermatology  [facility]: Phone: [phone], Fax:[phone]  [facility]: Phone: [phone], Fax: [phone]      ____________________________________________    CC: Derm Problem (Follow up)    HPI:  Ms. Sophia De Leon is a(n) 36 year old female who presents today as a return patient for Hidradenitis. The patient reports she didn't feel totally numb and didn't realize she wouldn't be totally numb. Still wants to do laser again.    Pt reports sharp to dull pain of stomach. It is gone after 2 days. She was taking it the last few days. Had stomach pain 2 days ago.  Before that she had it  1 to a few weeks prior. She did have it prior to that. She was placed on omeprazole for this pain and it was helpful. This was started about 1-2 months ago. Has had tomach pain for over 1 year. She had stomach pain all over like labor pain. Dizziness she has reported was prior to minocycline.     Patient is otherwise feeling well, without additional skin concerns.    Labs Reviewed:  NA    Physical Exam:  Vitals: There were no vitals taken for this visit.  SKIN: Teledermatology photos were reviewed; image quality and interpretability: acceptable. Image date: today   - erythema of the mons pubis with area of scarring  - No other lesions of concern on areas examined.     Medications:  Current Outpatient Medications   Medication     Acetaminophen (TYLENOL PO)     benzoyl peroxide 5 % external liquid     clindamycin (CLEOCIN T) 1 % external lotion     COMPOUND CONTAINING  "CONTROLLED SUBSTANCE (CMPD RX) - PHARMACY TO MIX COMPOUNDED MEDICATION     Gauze Pads & Dressings (TELFA NON-ADHERENT) 3\"X4\" PADS     IBUPROFEN PO     ketoconazole (NIZORAL) 2 % external shampoo     loratadine (CLARITIN) 10 MG tablet     minocycline (DYNACIN) 100 MG tablet     Multiple Vitamin (MULTIVITAMIN ADULT PO)     omeprazole (PRILOSEC) 10 MG DR capsule     tacrolimus (PROTOPIC) 0.1 % external ointment     tretinoin (RETIN-A) 0.025 % external cream     Current Facility-Administered Medications   Medication     lidocaine 1% with EPINEPHrine 1:100,000 injection 3 mL      Past Medical/Surgical History:   Patient Active Problem List   Diagnosis     Acne vulgaris     Hidradenitis suppurativa     Migraine with aura and without status migrainosus, not intractable     No past medical history on file.    CC No referring provider defined for this encounter. on close of this encounter.  "

## 2022-04-25 NOTE — LETTER
4/25/2022         RE: Sophia De Leon  [address] [facility] [address]        Dear Colleague,    Thank you for referring your patient, Sophia De Leon, to the [facility]. Please see a copy of my visit note below.    [facility] Dermatology Note  Encounter Date: Apr 25, 2022  Telephone and photos ([phone] ). Location of teledermatologist: [facility]. Start time: 11:23am. End time: 11:48am.    Dermatology Problem List:  #. Nodulocystic acne  #. Hidradenitis suppurativa, Beltran stage III  Stelara approved, but patient not comfortable starting yet given ongoing other health concerns (dizziness/lightheadedness/vertigo, sore throat/GERD, palpitations)  -Current: benzoyl peroxide 2.5% wash, clobetasol 0.05% ointment BID PRN  -Past: oral antibiotics (minocycline and rifampin), Humira (migraines), isotretinoin (dryness, headaches and dizziness),  anakinra prescribed (did not start), ustekinumab (did not start)  - ILK did help the inflammatory on cheek  - Prednisone made her worse and made dizziness worse.  - Last quantiferon gold: 2018  - Hep C ab neg 11/3/20  - HIV neg 11/3/20  - Hep B: surface ab +, surface antigen neg c/w immunity 8/12/18  - CBC last wnl 11/3/20  - BMP wnl 12/27/20  #. 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 Hx of Afib after childbirth   #. Wart, left heel  #. Hx of high-risk HPV     ____________________________________________     Assessment & Plan:      # HS/Acne - with flare today, call GI, get okay to continue minocycline.    - Tretinoin 0.025% cream for face  - Benzoyl peroxide 5%/clindamycin should be started   - Ndyag- she would like to repeat, will add ice to LMX- reviewed the  HS laser is antiinflammatory, hair may be removed. Review she may terminate at a tune,   - she will continue minocycline only if her GI doctor says it is okay   Per Dr. Franco, clobetasol for  sarna itch, no change not addressed  -KAUSHIK for GI records   -reviewed inflammatory bowel disease risks    ADDENDUM 4/25/2022: I called patient as rooming note demonstrates and elevated PHq9. I could not reach patient but no suicidal ideation noted on testing. Will have RN reach out to make sure resources are in place with mental health     Procedures Performed:    None    Follow-up: offered this week for injection and declined, she will call if she changes her mind. We will try to help her get okay from GI. She will check with insurance.   Staff:     Danielle Shepard MD    Department of Dermatology  [facility]: Phone: [phone], Fax:[phone]  [facility]: Phone: [phone], Fax: [phone]      ____________________________________________    CC: Derm Problem (Follow up)    HPI:  Ms. Sophia De Leon is a(n) 36 year old female who presents today as a return patient for Hidradenitis. The patient reports she didn't feel totally numb and didn't realize she wouldn't be totally numb. Still wants to do laser again.    Pt reports sharp to dull pain of stomach. It is gone after 2 days. She was taking it the last few days. Had stomach pain 2 days ago.  Before that she had it  1 to a few weeks prior. She did have it prior to that. She was placed on omeprazole for this pain and it was helpful. This was started about 1-2 months ago. Has had tomach pain for over 1 year. She had stomach pain all over like labor pain. Dizziness she has reported was prior to minocycline.     Patient is otherwise feeling well, without additional skin concerns.    Labs Reviewed:  NA    Physical Exam:  Vitals: There were no vitals taken for this visit.  SKIN: Teledermatology photos were reviewed; image quality and interpretability: acceptable. Image date: today   - erythema of the mons pubis with area of scarring  - No  "other lesions of concern on areas examined.     Medications:  Current Outpatient Medications   Medication     Acetaminophen (TYLENOL PO)     benzoyl peroxide 5 % external liquid     clindamycin (CLEOCIN T) 1 % external lotion     COMPOUND CONTAINING CONTROLLED SUBSTANCE (CMPD RX) - PHARMACY TO MIX COMPOUNDED MEDICATION     Gauze Pads & Dressings (TELFA NON-ADHERENT) 3\"X4\" PADS     IBUPROFEN PO     ketoconazole (NIZORAL) 2 % external shampoo     loratadine (CLARITIN) 10 MG tablet     minocycline (DYNACIN) 100 MG tablet     Multiple Vitamin (MULTIVITAMIN ADULT PO)     omeprazole (PRILOSEC) 10 MG DR capsule     tacrolimus (PROTOPIC) 0.1 % external ointment     tretinoin (RETIN-A) 0.025 % external cream     Current Facility-Administered Medications   Medication     lidocaine 1% with EPINEPHrine 1:100,000 injection 3 mL      Past Medical/Surgical History:   Patient Active Problem List   Diagnosis     Acne vulgaris     Hidradenitis suppurativa     Migraine with aura and without status migrainosus, not intractable     No past medical history on file.    CC No referring provider defined for this encounter. on close of this encounter.    Per chart review in [facility], patient is following with psych and most recent visit was 4/14/22.  Sophie Restrepo RN        Again, thank you for allowing me to participate in the care of your patient.        Sincerely,        Danielle Shepard MD    "

## 2022-04-25 NOTE — PATIENT INSTRUCTIONS
[facility] Dermatology Visit    Thank you for allowing us to participate in your care. Your findings, instructions and follow-up plan are as follows:         When should I call my doctor?  If you are worsening or not improving, please, contact us or seek urgent care as noted below.     Who should I call with questions (adults)?  [facility] (adult and pediatric): [phone]  [facility] (adult): [phone]  For urgent needs outside of business hours call the [facility] at [phone] and ask for the dermatology resident on call  If this is a medical emergency and you are unable to reach an ER, Call [phone]    Who should I call with questions (pediatric)?  [facility]- Pediatric Dermatology  Dr. Diana Huston, Dr. Mahendra Dela Cruz, Dr. Omayra Lerma, Maxine Carter, PA  Dr. Angelique Almonte, Dr. Brenda Kovacs & Dr. Henri Underwood  Non Urgent  Nurse Triage Line; [phone]- Angelica and Jannie RN Care Coordinators   [place] (/Complex ) [phone]    If you need a prescription refill, please contact your pharmacy. Refills are approved or denied by our physicians during normal business hours, Monday through Fridays  Per office policy, refills will not be granted if you have not been seen within the past year (or sooner depending on your child's condition).    Scheduling Information:  Pediatric Appointment Scheduling and Call Center [phone]  Radiology Scheduling- [phone]  Sedation Unit Scheduling- [phone]  [place] Scheduling- General [phone]; Pediatric Dermatology [phone]  Main  Services: [phone]  Martiniquais: [phone]  Malaysian: [phone]  Hmong/Darren/Surinamese: [phone]  Preadmission Nursing Department Fax Number: [phone] (fax all pre-operative paperwork to this number)    For urgent matters arising during evenings, weekends, or  holidays that cannot wait for normal business hours please call [phone] and ask for the dermatology resident on call to be paged.

## 2022-04-25 NOTE — Clinical Note
Patient with high PHq-9. I could not reach by phone. No suicidal ideation per testing. Please call, offer referral to pscyhiatry and psychology. Also, review if any thoughts of SI to proceed to the ER.

## 2022-04-25 NOTE — NURSING NOTE
Chief Complaint   Patient presents with     Derm Problem     Follow up     Teledermatology Nurse Call Patients:     Are you in the [facility] at the time of the encounter? Yes    Today's visit will be billed to you and your insurance.    A teledermatology visit is not as thorough as an in-person visit and the quality of the photograph sent may not be of the same quality as that taken by the dermatology clinic.    [facility] Dermatology Visit    Thank you for allowing us to participate in your care. Your findings, instructions and follow-up plan are as follows:         When should I call my doctor?    If you are worsening or not improving, please, contact us or seek urgent care as noted below.     Who should I call with questions (adults)?    [facility] (adult and pediatric): [phone]    [facility] (adult): [phone]    For urgent needs outside of business hours call the [facility] at [phone] and ask for the dermatology resident on call    If this is a medical emergency and you are unable to reach an ER, Call [phone]    Who should I call with questions (pediatric)?  [facility]- Pediatric Dermatology  Dr. Diana Huston, Dr. Mahendra Dela Cruz, Dr. Omayra Lerma, Maxine Carter, SEAN Almonte, Dr. Brenda Kovacs & Dr. Henri Underwood  Non Urgent  Nurse Triage Line; [phone]- Angelica and Jannie GANNON Care Coordinators   [place] (/Complex ) [phone]    If you need a prescription refill, please contact your pharmacy. Refills are approved or denied by our physicians during normal business hours, Monday through Fridays  Per office policy, refills will not be granted if you have not been seen within the past year (or sooner depending on your child's condition).    Scheduling Information:  Pediatric Appointment Scheduling and Call Center [phone]  Radiology Scheduling- [phone]  Sedation Unit Scheduling- [phone]  [place] Scheduling- General [phone]; Pediatric Dermatology [phone]  Main  Services: [phone]  Danish: [phone]  Zimbabwean: [phone]  Hmong/Darren/Wil: [phone]  Preadmission Nursing Department Fax Number: [phone] (fax all pre-operative paperwork to this number)    For urgent matters arising during evenings, weekends, or holidays that cannot wait for normal business hours please call [phone] and ask for the dermatology resident on call to be paged.    Brianda Joseph VF

## 2022-04-26 NOTE — PROGRESS NOTES
Per chart review in [facility], patient is following with psych and most recent visit was 4/14/22.  Sophie Restrepo RN

## 2022-05-05 ENCOUNTER — MYC MEDICAL ADVICE (OUTPATIENT)
Dept: DERMATOLOGY | Facility: CLINIC | Age: 37
End: 2022-05-05
Payer: COMMERCIAL

## 2022-05-09 ENCOUNTER — VIRTUAL VISIT (OUTPATIENT)
Dept: DERMATOLOGY | Facility: CLINIC | Age: 37
End: 2022-05-09
Payer: COMMERCIAL

## 2022-05-09 DIAGNOSIS — L85.9 HYPERKERATOSIS: Primary | ICD-10-CM

## 2022-05-09 PROCEDURE — 99214 OFFICE O/P EST MOD 30 MIN: CPT | Mod: 95 | Performed by: DERMATOLOGY

## 2022-05-09 NOTE — NURSING NOTE
Chief Complaint   Patient presents with     Follow Up     Sophia, is here for a follow up and states she is not doing good     Teledermatology Nurse Call Patients:     Are you in the [facility] at the time of the encounter? yes    Today's visit will be billed to you and your insurance.    A teledermatology visit is not as thorough as an in-person visit and the quality of the photograph sent may not be of the same quality as that taken by the dermatology clinic.  Beto Medrano VF

## 2022-05-09 NOTE — PROGRESS NOTES
[facility] Dermatology Note  Encounter Date: May 9, 2022  [employer] and Telephone ([phone] ). Location of teledermatologist: [facility].  Start time: 2:12pm End time: 2:29pm.    Dermatology Problem List:  #. Nodulocystic acne  #. Hidradenitis suppurativa, Beltran stage III  Stelara approved, but patient not comfortable starting yet given ongoing other health concerns (dizziness/lightheadedness/vertigo, sore throat/GERD, palpitations)  -Current: benzoyl peroxide 2.5% wash, clobetasol 0.05% ointment BID PRN  -Past: oral antibiotics (minocycline and rifampin), Humira (migraines), isotretinoin (dryness, headaches and dizziness),  anakinra prescribed (did not start), ustekinumab (did not start)  - ILK did help the inflammatory on cheek  - Prednisone made her worse and made dizziness worse.  - Last quantiferon gold: 2018  - Hep C ab neg 11/3/20  - HIV neg 11/3/20  - Hep B: surface ab +, surface antigen neg c/w immunity 8/12/18  - CBC last wnl 11/3/20  - BMP wnl 12/27/20  #. 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 Hx of Afib after childbirth   #. Wart, left heel  #. Hx of high-risk HPV     ____________________________________________     Assessment & Plan:      # HS/Acne - with flare today, call GI, get okay to continue minocycline and at last visit unclear if okay to continue from GI standpoint. Does have one nodule on cheek, see below   - Tretinoin 0.025% cream for face, thin layer daily  - Benzoyl peroxide 5%/clindamycin should be in use at this time  - Ndyag- scheduled for this fall. Reviewed numbing medication. Risks of toxicity including cardiac, siezure and death. Pt understands the risks.   Per Dr. Franco, clobetasol for sarna itch, no change not addressed  -warm packs for darining -send new photo of cheek to us       #hyperkeratotic heels- pt feels wart  -trial of vaseline petrloeum jelly in cracks  followed by urea 40%ointment, wash hands after use     # nodule in face- pt report this has gone-acneiform/hs(see above)- photo is 1 week old and feels less painful and drained  -Go to ER or urgent care for signs of infection including increasing pain, increasing redness, discharge such as pus, fevers, chills or if not feeling well.  -BP and clindamycin    #vaingal symptoms after antibiotic, most likely candida. Has hx of afib  -recommend monistat 7 days recommended (or miconazole topical)     Procedures Performed:    None    Follow-up: 5/26/2022 with Dr. Franco, she reported she would send updated image  Staff:     Danielle Shepard MD    Department of Dermatology  [facility]: Phone: [phone], Fax:[phone]  [facility]: Phone: [phone], Fax: [phone]    ADDENDUM 5/10/22- chart reviewed for updated image and none seen.   ____________________________________________    CC: Follow Up (Sophia, is here for a follow up and states she is not doing good )    HPI:  Ms. Sophia De Leon is a(n) 36 year old female who presents today as a return patient for flare and wart on heel      Wart on heel is painful. Reports bothersome. In the past it has been painful.     1 week ago painful even with chewing and eating. She started up minocycline with GI discomfort with this and loose stools and vaginal itching. She feels this is mostly resolved when she stopped the med.     She last had dental work about 1 month. She had an extraction. It was on the other side of the mouth.   Patient is otherwise feeling well, without additional skin concerns. She feels this is an HS flare up. Denies fevers. She came off minocycline 2-3 days ago.     She reports her GI doctor is no longer working    Labs Reviewed:   Ref Range & Units 5 mo ago   SODIUM 135 - 145 mmol/L 141    POTASSIUM 3.5 - 5.0 mmol/L 4.0   "  CHLORIDE 98 - 110 mmol/L 109    CO2,TOTAL 21 - 31 mmol/L 23    ANION GAP 5 - 18 9    GLUCOSE 65 - 100 mg/dL 97    CALCIUM 8.5 - 10.5 mg/dL 9.4    BUN 8 - 25 mg/dL 10    CREATININE 0.57 - 1.11 mg/dL 0.67    BUN/CREAT RATIO           10 - 20 15    GFR if African American >60 ml/min/1.73m2 >60    GFR if not African American >60 ml/min/1.73m2 >60        Physical Exam:  Vitals: There were no vitals taken for this visit.  SKIN: Teledermatology photos were reviewed; image quality and interpretability: acceptable. Image date: photo taken 1 week ago.  - acneiform nodule, right cheek with central yellow- pt reports it no longer looks like this    -hyperkeratotic heel- No other lesions of concern on areas examined.     Medications:  Current Outpatient Medications   Medication     Acetaminophen (TYLENOL PO)     benzoyl peroxide 5 % external liquid     clindamycin (CLEOCIN T) 1 % external lotion     COMPOUND CONTAINING CONTROLLED SUBSTANCE (CMPD RX) - PHARMACY TO MIX COMPOUNDED MEDICATION     Gauze Pads & Dressings (TELFA NON-ADHERENT) 3\"X4\" PADS     IBUPROFEN PO     ketoconazole (NIZORAL) 2 % external shampoo     loratadine (CLARITIN) 10 MG tablet     minocycline (DYNACIN) 100 MG tablet     Multiple Vitamin (MULTIVITAMIN ADULT PO)     omeprazole (PRILOSEC) 10 MG DR capsule     tacrolimus (PROTOPIC) 0.1 % external ointment     tretinoin (RETIN-A) 0.025 % external cream     Current Facility-Administered Medications   Medication     lidocaine 1% with EPINEPHrine 1:100,000 injection 3 mL      Past Medical/Surgical History:   Patient Active Problem List   Diagnosis     Acne vulgaris     Hidradenitis suppurativa     Migraine with aura and without status migrainosus, not intractable     No past medical history on file.    CC No referring provider defined for this encounter. on close of this encounter.  "

## 2022-05-09 NOTE — PATIENT INSTRUCTIONS
[facility] Dermatology Visit    Thank you for allowing us to participate in your care. Your findings, instructions and follow-up plan are as follows:     For vaginal symptoms Monistat 7 days  For heels  -trial of vaseline petroeum jelly in cracks followed by urea 40%ointment, wash hands after use     For face  -Go to ER or urgent care for signs of infection including increasing pain, increasing redness, discharge such as pus, fevers, chills or if not feeling well.  -benzoyl peroxide and clindamycin and tretinoin  -warm packs    -send new photo of face    When should I call my doctor?  If you are worsening or not improving, please, contact us or seek urgent care as noted below.     Who should I call with questions (adults)?  [facility] (adult and pediatric): [phone]  [facility] (adult): [phone]  For urgent needs outside of business hours call the [facility] at [phone] and ask for the dermatology resident on call  If this is a medical emergency and you are unable to reach an ER, Call [phone]    Who should I call with questions (pediatric)?  [facility]- Pediatric Dermatology  Dr. Diana Huston, Dr. Mahendra Dela Cruz, Dr. Omayra Lerma, Maxine Carter, SEAN Almonte, Dr. Brenda Kovacs & Dr. Henri Underwood  Non Urgent  Nurse Triage Line; [phone]- Angelica and Jannie GANNON Care Coordinators   [place] (/Complex ) [phone]    If you need a prescription refill, please contact your pharmacy. Refills are approved or denied by our physicians during normal business hours, Monday through Fridays  Per office policy, refills will not be granted if you have not been seen within the past year (or sooner depending on your child's condition).    Scheduling Information:  Pediatric Appointment Scheduling and Call Center [phone]  Radiology Scheduling-  [phone]  Sedation Unit Scheduling- [phone]  [place] Scheduling- General [phone]; Pediatric Dermatology [phone]  Main  Services: [phone]  Anguillan: [phone]  Hong Konger: [phone]  Hmong/Azeri/Wil: [phone]  Preadmission Nursing Department Fax Number: [phone] (fax all pre-operative paperwork to this number)    For urgent matters arising during evenings, weekends, or holidays that cannot wait for normal business hours please call [phone] and ask for the dermatology resident on call to be paged.

## 2022-05-09 NOTE — LETTER
5/9/2022         RE: Sophia De Leon  [address] [facility] [address]        Dear Colleague,    Thank you for referring your patient, Sophia De Leon, to the [facility]. Please see a copy of my visit note below.    [facility] Dermatology Note  Encounter Date: May 9, 2022  Store-and-Forward and Telephone ([phone] ). Location of teledermatologist: [facility].  Start time: 2:12pm End time: 2:29pm.    Dermatology Problem List:  #. Nodulocystic acne  #. Hidradenitis suppurativa, Beltran stage III  Stelara approved, but patient not comfortable starting yet given ongoing other health concerns (dizziness/lightheadedness/vertigo, sore throat/GERD, palpitations)  -Current: benzoyl peroxide 2.5% wash, clobetasol 0.05% ointment BID PRN  -Past: oral antibiotics (minocycline and rifampin), Humira (migraines), isotretinoin (dryness, headaches and dizziness),  anakinra prescribed (did not start), ustekinumab (did not start)  - ILK did help the inflammatory on cheek  - Prednisone made her worse and made dizziness worse.  - Last quantiferon gold: 2018  - Hep C ab neg 11/3/20  - HIV neg 11/3/20  - Hep B: surface ab +, surface antigen neg c/w immunity 8/12/18  - CBC last wnl 11/3/20  - BMP wnl 12/27/20  #. 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 Hx of Afib after childbirth   #. Wart, left heel  #. Hx of high-risk HPV     ____________________________________________     Assessment & Plan:      # HS/Acne - with flare today, call GI, get okay to continue minocycline and at last visit unclear if okay to continue from GI standpoint. Does have one nodule on cheek, see below   - Tretinoin 0.025% cream for face, thin layer daily  - Benzoyl peroxide 5%/clindamycin should be in use at this time  - Ndyag- scheduled for this fall. Reviewed numbing medication. Risks of toxicity including cardiac, siezure and death. Pt  understands the risks.   Per Dr. Franco, clobetasol for sarna itch, no change not addressed  -warm packs for darining -send new photo of cheek to us       #hyperkeratotic heels- pt feels wart  -trial of vaseline petrloeum jelly in cracks followed by urea 40%ointment, wash hands after use     # nodule in face- pt report this has gone-acneiform/hs(see above)- photo is 1 week old and feels less painful and drained  -Go to ER or urgent care for signs of infection including increasing pain, increasing redness, discharge such as pus, fevers, chills or if not feeling well.  -BP and clindamycin    #vaingal symptoms after antibiotic, most likely candida. Has hx of afib  -recommend monistat 7 days recommended (or miconazole topical)     Procedures Performed:    None    Follow-up: 5/26/2022 with Dr. Franco, she reported she would send updated image  Staff:     Danielle Shepard MD    Department of Dermatology  [facility]: Phone: [phone], Fax:[phone]  [facility]: Phone: [phone], Fax: [phone]    ADDENDUM 5/10/22- chart reviewed for updated image and none seen.   ____________________________________________    CC: Follow Up (Sophia, is here for a follow up and states she is not doing good )    HPI:  Ms. Sophia De Leon is a(n) 36 year old female who presents today as a return patient for flare and wart on heel      Wart on heel is painful. Reports bothersome. In the past it has been painful.     1 week ago painful even with chewing and eating. She started up minocycline with GI discomfort with this and loose stools and vaginal itching. She feels this is mostly resolved when she stopped the med.     She last had dental work about 1 month. She had an extraction. It was on the other side of the mouth.   Patient is otherwise feeling well, without additional skin concerns. She feels this  "is an HS flare up. Denies fevers. She came off minocycline 2-3 days ago.     She reports her GI doctor is no longer working    Labs Reviewed:   Ref Range & Units 5 mo ago   SODIUM 135 - 145 mmol/L 141    POTASSIUM 3.5 - 5.0 mmol/L 4.0    CHLORIDE 98 - 110 mmol/L 109    CO2,TOTAL 21 - 31 mmol/L 23    ANION GAP 5 - 18 9    GLUCOSE 65 - 100 mg/dL 97    CALCIUM 8.5 - 10.5 mg/dL 9.4    BUN 8 - 25 mg/dL 10    CREATININE 0.57 - 1.11 mg/dL 0.67    BUN/CREAT RATIO           10 - 20 15    GFR if African American >60 ml/min/1.73m2 >60    GFR if not African American >60 ml/min/1.73m2 >60        Physical Exam:  Vitals: There were no vitals taken for this visit.  SKIN: Teledermatology photos were reviewed; image quality and interpretability: acceptable. Image date: photo taken 1 week ago.  - acneiform nodule, right cheek with central yellow- pt reports it no longer looks like this    -hyperkeratotic heel- No other lesions of concern on areas examined.     Medications:  Current Outpatient Medications   Medication     Acetaminophen (TYLENOL PO)     benzoyl peroxide 5 % external liquid     clindamycin (CLEOCIN T) 1 % external lotion     COMPOUND CONTAINING CONTROLLED SUBSTANCE (CMPD RX) - PHARMACY TO MIX COMPOUNDED MEDICATION     Gauze Pads & Dressings (TELFA NON-ADHERENT) 3\"X4\" PADS     IBUPROFEN PO     ketoconazole (NIZORAL) 2 % external shampoo     loratadine (CLARITIN) 10 MG tablet     minocycline (DYNACIN) 100 MG tablet     Multiple Vitamin (MULTIVITAMIN ADULT PO)     omeprazole (PRILOSEC) 10 MG DR capsule     tacrolimus (PROTOPIC) 0.1 % external ointment     tretinoin (RETIN-A) 0.025 % external cream     Current Facility-Administered Medications   Medication     lidocaine 1% with EPINEPHrine 1:100,000 injection 3 mL      Past Medical/Surgical History:   Patient Active Problem List   Diagnosis     Acne vulgaris     Hidradenitis suppurativa     Migraine with aura and without status migrainosus, not intractable     No past " medical history on file.    CC No referring provider defined for this encounter. on close of this encounter.      Again, thank you for allowing me to participate in the care of your patient.        Sincerely,        Danielle Shepard MD

## 2022-05-10 ENCOUNTER — TELEPHONE (OUTPATIENT)
Dept: DERMATOLOGY | Facility: CLINIC | Age: 37
End: 2022-05-10
Payer: COMMERCIAL

## 2022-05-10 NOTE — TELEPHONE ENCOUNTER
Needs to be added on to [facility] or [facility] for possibly Kenalog injection or I and D.     Beto Medrano VF

## 2022-05-10 NOTE — TELEPHONE ENCOUNTER
I left a message for patient to call [facility].    Per chart review, patient reports lesion on face as resolved.  Is appt still needed?  Sophie Restrepo RN

## 2022-05-11 NOTE — TELEPHONE ENCOUNTER
PLEASE ADVISE ON WHAT IS NEEDING TO BE DONE.    Below is message recvd in staff messages from Dr. Shepard. Photo is in from Monday. Prev message sent to provider by CECILE Restrepo RN yesterday.    Danielle Shepard MD  [facility] Dermatology [facility] [place]  PT stated her facial lesion was resolved and she would send updated image on Monday.   .   Chart reviewd and no updated image     I see a message to be about appt      Georgi not sure     Please call her and make sure the lesion on the face is still resolving     Danielle Shepard MD   ____________________________________    Recvd new staff message just now @10:50 from procedure :    HI,     This was the information left in the check out notes:     Check Out Comments: Needs to be added on to [facility] for possibly Kenalog injection or I and D  Disposition: Return in about 2 days (around 5/11/2022).     Where should I schedule this patient?     Thanks   Ksenia hart Procedure        Avani Fraga, JOHNATHAN on 5/11/2022 at 10:53 AM

## 2022-05-11 NOTE — TELEPHONE ENCOUNTER
The patient said this lesion was improving. Please, call her and see if she still needs to be seen. She never sent a follow up image.     Is there another question I am missing?   Thank you    Danielle Shepard MD    Department of Dermatology  [facility]: Phone: [phone], Fax:[phone]  [facility]: Phone: [phone], Fax: [phone]

## 2022-05-11 NOTE — TELEPHONE ENCOUNTER
Writer spoke to patient who stated that her face lesion has improved. She currently has a lesion on her groin area that she said is starting to drain so she does not want to schedule an appointment at this time. Patient will call back if/when she needs to be seen back. Has an appointment with Dr. Franco 5/26.    Kim Huber LPN

## 2022-05-21 ENCOUNTER — HEALTH MAINTENANCE LETTER (OUTPATIENT)
Age: 37
End: 2022-05-21

## 2022-06-14 DIAGNOSIS — B37.2 CANDIDAL INTERTRIGO: ICD-10-CM

## 2022-06-14 RX ORDER — CLOTRIMAZOLE 1 %
CREAM (GRAM) TOPICAL 2 TIMES DAILY
Qty: 45 G | Refills: 3 | Status: SHIPPED | OUTPATIENT
Start: 2022-06-14 | End: 2023-12-02

## 2022-06-14 NOTE — TELEPHONE ENCOUNTER
clotrimazole (LOTRIMIN) 1 % external cream   Last Written Prescription Date:  ?  Last Fill Quantity: /,   # refills: ?  Last Office Visit :  5/9/2022  Future Office visit:   8/16/2022    Routing refill request to provider for review/approval because:  Drug not active on patient's medication list      Genet Keane RN  Central Triage Red Flags/Med Refills

## 2022-06-14 NOTE — TELEPHONE ENCOUNTER
Received refill request for clotrimazole as the resident on call. Reviewed patient's chart and attached communication. Patient last seen 5/9/22 for for acne/HS but was previously prescribed clotrimazole for candida intertrigo. RTC 8/16/22. After reviewing the medication list and assessment and plan from last visit, the refill request was accepted.    CC'ing Dr. Franco as a OBINNA Natarajan  PGY4 Dermatology Resident  Pager: [phone]

## 2022-08-16 ENCOUNTER — OFFICE VISIT (OUTPATIENT)
Dept: DERMATOLOGY | Facility: CLINIC | Age: 37
End: 2022-08-16
Payer: COMMERCIAL

## 2022-08-16 DIAGNOSIS — L08.0 PYODERMA (SKIN INFECTION): ICD-10-CM

## 2022-08-16 DIAGNOSIS — L73.2 HIDRADENITIS SUPPURATIVA: ICD-10-CM

## 2022-08-16 DIAGNOSIS — Z76.89 ENCOUNTER PRIOR TO INITIATION OF MEDICATION: Primary | ICD-10-CM

## 2022-08-16 LAB — HCG UR QL: NEGATIVE

## 2022-08-16 PROCEDURE — 99214 OFFICE O/P EST MOD 30 MIN: CPT | Mod: 25 | Performed by: DERMATOLOGY

## 2022-08-16 PROCEDURE — 81025 URINE PREGNANCY TEST: CPT | Performed by: DERMATOLOGY

## 2022-08-16 PROCEDURE — 87070 CULTURE OTHR SPECIMN AEROBIC: CPT | Performed by: DERMATOLOGY

## 2022-08-16 PROCEDURE — 87205 SMEAR GRAM STAIN: CPT | Performed by: DERMATOLOGY

## 2022-08-16 PROCEDURE — 17999 UNLISTD PX SKN MUC MEMB SUBQ: CPT | Mod: 58 | Performed by: DERMATOLOGY

## 2022-08-16 RX ORDER — MINOCYCLINE HYDROCHLORIDE 100 MG/1
100 CAPSULE ORAL 2 TIMES DAILY
Qty: 20 CAPSULE | Refills: 0 | Status: SHIPPED | OUTPATIENT
Start: 2022-08-16 | End: 2022-11-28

## 2022-08-16 ASSESSMENT — PAIN SCALES - GENERAL: PAINLEVEL: SEVERE PAIN (6)

## 2022-08-16 NOTE — PROGRESS NOTES
[facility] Dermatology Note  Encounter Date: Aug 16, 2022  Office Visit     Dermatology Problem List:  #. Nodulocystic acne  #. Hidradenitis suppurativa, Beltran stage III  Stelara approved, but patient not comfortable starting yet given ongoing other health concerns (dizziness/lightheadedness/vertigo, sore throat/GERD, palpitations)  -Current: benzoyl peroxide 2.5% wash, clobetasol 0.05% ointment BID PRN  -Past: oral antibiotics (minocycline and rifampin), Humira (migraines), isotretinoin (dryness, headaches and dizziness),  anakinra prescribed (did not start), ustekinumab (did not start)  - ILK did help the inflammatory on cheek  - Prednisone made her worse and made dizziness worse.  - Last quantiferon gold: 2018  - Hep C ab neg 11/3/20  - HIV neg 11/3/20  - Hep B: surface ab +, surface antigen neg c/w immunity 8/12/18  - CBC last wnl 11/3/20  - BMP wnl 12/27/20  #. 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 Hx of Afib after childbirth   #. Wart, left heel  #. Hx of high-risk HPV     ____________________________________________     Assessment & Plan:      # HS/Acne -no change to plan  - Tretinoin 0.025% cream for face, thin layer daily  - Benzoyl peroxide 5%/clindamycin should be in use at this time  - Ndyag- re consented again with risks of irritation for numbing and also see laser procedure note.   Per Dr. Franco, clobetasol for sarna itch, no change not addressed      #Hx of shingles, right forehead, now with new pustules  - patient declines kelfex, start minocycline twice daily for ten days, HCG prior, she will be abstinent during this time  -culture today  -Go to ER or urgent care for signs of infection including increasing pain, increasing redness, discharge such as pus, fevers, chills or if not feeling well.           #hyperkeratotic heels- pt feels wart  -trial of vaseline petrloeum jelly in cracks followed by urea 40%ointment, wash hands  "after use               Procedures Performed:   See laser record, same settings      Follow up as scheduled for NDYAg    Staff:     Danielle Shepard MD    Department of Dermatology  [facility]: Phone: [phone], Fax:[phone]  [facility]: Phone: [phone], Fax: [phone]      ____________________________________________    CC: Laser Treatment (ND Yag HS# 2 Mons, groin, perineum )    HPI:  Ms. Sophia De Leon is a(n) 36 year old female who presents today as a return patient for rash on head. Was in ER august 6th. wAs found to  Have shingles.       7/29/202 covid positive  Patient is otherwise feeling well, without additional skin concerns.     Labs Reviewed:  Ct completed outside    Physical Exam:  Vitals: There were no vitals taken for this visit.  SKIN:  -pustules right forehead, grouped  - No other lesions of concern on areas examined.     Medications:  Current Outpatient Medications   Medication     Acetaminophen (TYLENOL PO)     benzoyl peroxide 5 % external liquid     clindamycin (CLEOCIN T) 1 % external lotion     clotrimazole (LOTRIMIN) 1 % external cream     Gauze Pads & Dressings (TELFA NON-ADHERENT) 3\"X4\" PADS     IBUPROFEN PO     loratadine (CLARITIN) 10 MG tablet     minocycline (DYNACIN) 100 MG tablet     urea (GORDONS UREA) 40 % external ointment     COMPOUND CONTAINING CONTROLLED SUBSTANCE (CMPD RX) - PHARMACY TO MIX COMPOUNDED MEDICATION     ketoconazole (NIZORAL) 2 % external shampoo     Multiple Vitamin (MULTIVITAMIN ADULT PO)     omeprazole (PRILOSEC) 10 MG DR capsule     tacrolimus (PROTOPIC) 0.1 % external ointment     tretinoin (RETIN-A) 0.025 % external cream     Current Facility-Administered Medications   Medication     lidocaine 1% with EPINEPHrine 1:100,000 injection 3 mL      Past Medical History:   Patient Active Problem List   Diagnosis     Acne vulgaris "     Hidradenitis suppurativa     Migraine with aura and without status migrainosus, not intractable     No past medical history on file.    CC No referring provider defined for this encounter. on close of this encounter.

## 2022-08-16 NOTE — PROCEDURES
Dermatology Laser Intake Checklist:  History of psoriasis: No  History of recent tan, indoor or outdoor tanning/vacation or other sun exposure: No  History of vitiligo: No  Family history of vitiligo: No  Recent other cosmetic procedure(microderm abrasion/peel/hair removal/facial etc): No  History of HSV: No  Did the patient start valtrex: No  For genital laser hair removal patient only: Is there a history of genital warts or condyloma: No  Tattoo in the area to be treated: No  Is patient using hydroquinone: No  Retinoids and other acne medications stopped for 2 weeks: No  Has the patient had accutane in the last 6-12 months: No  Pregnant or breastfeeding: No  History of skin cancer in area planned for treatment: No  History of treatment with gold: No  Changes in medical history: No  Photos obtained: No  Does the patient smoke: No  Is the patient on ibuprofen/aspirin/plavix/coumadin/other blood thinner: No  If patient is taking narcotic or diazepam(valium)-does patient have : No  There were no vitals taken for this visit.

## 2022-08-16 NOTE — LETTER
8/16/2022         RE: Sophia De Leon  [address] [facility] [address]        Dear Colleague,    Thank you for referring your patient, Sophia De Leon, to the [facility]. Please see a copy of my visit note below.    [facility] Dermatology Note  Encounter Date: Aug 16, 2022  Office Visit     Dermatology Problem List:  #. Nodulocystic acne  #. Hidradenitis suppurativa, Beltran stage III  Stelara approved, but patient not comfortable starting yet given ongoing other health concerns (dizziness/lightheadedness/vertigo, sore throat/GERD, palpitations)  -Current: benzoyl peroxide 2.5% wash, clobetasol 0.05% ointment BID PRN  -Past: oral antibiotics (minocycline and rifampin), Humira (migraines), isotretinoin (dryness, headaches and dizziness),  anakinra prescribed (did not start), ustekinumab (did not start)  - ILK did help the inflammatory on cheek  - Prednisone made her worse and made dizziness worse.  - Last quantiferon gold: 2018  - Hep C ab neg 11/3/20  - HIV neg 11/3/20  - Hep B: surface ab +, surface antigen neg c/w immunity 8/12/18  - CBC last wnl 11/3/20  - BMP wnl 12/27/20  #. 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 Hx of Afib after childbirth   #. Wart, left heel  #. Hx of high-risk HPV     ____________________________________________     Assessment & Plan:      # HS/Acne -no change to plan  - Tretinoin 0.025% cream for face, thin layer daily  - Benzoyl peroxide 5%/clindamycin should be in use at this time  - Ndyag- re consented again with risks of irritation for numbing and also see laser procedure note.   Per Dr. Franco, clobetasol for sarna itch, no change not addressed      #Hx of shingles, right forehead, now with new pustules  - patient declines kelfex, start minocycline twice daily for ten days, HCG prior, she will be abstinent during this time  -culture today  -Go to ER or urgent care for signs of infection  "including increasing pain, increasing redness, discharge such as pus, fevers, chills or if not feeling well.           #hyperkeratotic heels- pt feels wart  -trial of vaseline petrloeum jelly in cracks followed by urea 40%ointment, wash hands after use               Procedures Performed:   See laser record, same settings      Follow up as scheduled for Josias    Staff:     Danielle Shepard MD    Department of Dermatology  [facility]: Phone: [phone], Fax:[phone]  [facility]: Phone: [phone], Fax: [phone]      ____________________________________________    CC: Laser Treatment (ND Yag HS# 2 Mons, groin, perineum )    HPI:  Ms. Sophia De Leon is a(n) 36 year old female who presents today as a return patient for rash on head. Was in ER august 6th. wAs found to  Have shingles.       7/29/202 covid positive  Patient is otherwise feeling well, without additional skin concerns.     Labs Reviewed:  Ct completed outside    Physical Exam:  Vitals: There were no vitals taken for this visit.  SKIN:  -pustules right forehead, grouped  - No other lesions of concern on areas examined.     Medications:  Current Outpatient Medications   Medication     Acetaminophen (TYLENOL PO)     benzoyl peroxide 5 % external liquid     clindamycin (CLEOCIN T) 1 % external lotion     clotrimazole (LOTRIMIN) 1 % external cream     Gauze Pads & Dressings (TELFA NON-ADHERENT) 3\"X4\" PADS     IBUPROFEN PO     loratadine (CLARITIN) 10 MG tablet     minocycline (DYNACIN) 100 MG tablet     urea (GORDONS UREA) 40 % external ointment     COMPOUND CONTAINING CONTROLLED SUBSTANCE (CMPD RX) - PHARMACY TO MIX COMPOUNDED MEDICATION     ketoconazole (NIZORAL) 2 % external shampoo     Multiple Vitamin (MULTIVITAMIN ADULT PO)     omeprazole (PRILOSEC) 10 MG DR capsule     tacrolimus (PROTOPIC) 0.1 % external ointment     " tretinoin (RETIN-A) 0.025 % external cream     Current Facility-Administered Medications   Medication     lidocaine 1% with EPINEPHrine 1:100,000 injection 3 mL      Past Medical History:   Patient Active Problem List   Diagnosis     Acne vulgaris     Hidradenitis suppurativa     Migraine with aura and without status migrainosus, not intractable     No past medical history on file.    CC No referring provider defined for this encounter. on close of this encounter.        Again, thank you for allowing me to participate in the care of your patient.        Sincerely,        Danielle Shepard MD

## 2022-08-16 NOTE — PATIENT INSTRUCTIONS
Go to ER or urgent care for signs of infection including increasing pain, increasing redness, discharge such as pus, fevers, chills or if not feeling well.     Go to lab for pregnancy test, if negative take minoxycline           Gentle Max Hidradenitis Instructions    Laser : I will have redness, pain and swelling. I may have skin discoloration, bruising and itching. Risks are permenant discoloration, hives, infection scarring, eye injury,hair growth, and blister. The outcome could be no improvement or slight improvement. Multiple treatments are required. All hair will not be removed.     This treatment is typically done as a series of approximately 6 treatments.     The numbing cream will not completely numb the area.     You may stop the procedure at anytime.     The laser is not FDA approved for hidradenitis.        Before the procedure:  Sun Protection:  Do not allow the area to become tan or come in with a tan for a treatment. Tans will increased the risks of the procedure. Do not use spray or any over counter product self tanners prior to the procedure.     Shaving:   Gently shave the area the evening before the procedure the night prior.      Other:  Avoid any retinols such as Differin, adapalene, retinol or tretinoin to the treated area 1 week prior. Hold bleaching creams such as hydroquinone until 1 week prior.  Avoid any hair removal procedures such as waxing, electrolysis or other lasers on the skin within 6 weeks prior. Do not have any other cosmetic procedures done on the area within the prior 6 weeks such as chemical peels or dermabrasion.      Post Procedure:  Day 1-7  The healing time for any given treatment varies between clients. The following represents the general recovery phases you might expect. Individual clients may experience variations from this course.     Swelling/Discomfort/Redness:  The most common side effects are erythema and edema (redness and swelling) which generally occur  immediately after and typically resolve within 48 hours. If crusting and blistering occurs call the clinic.     Activity:  Avoid swimming the day of laser hair removal treatment. Also, no rigorous exercise the day of treatment.     Moisturizers and Sunscreens:  Wait until the skin has returned to normal to start topical products.     Sun Protection:  Do not allow the area to become tan or come in with a tan for a treatment. Tans will increased the risks of the procedure. Do not use spray or any over counter product self tanners prior to the procedure.     Warning signs:  Call the clinic if you have significant pain, increasing redness, pus, blisters/crusting or for any other concerns.     Who should I call with questions?  [facility]: [phone]  [facility]: [phone]  For urgent needs outside of business hours call the [facility] at [phone] and ask for the dermatology resident on call  [employer] messaging response may be delayed by several days

## 2022-08-16 NOTE — PROCEDURES
Laser Hair Removal Gentle Max Prox (1064nm)  Procedure Date: Aug 16, 2022    Staff Surgeon: Danielle Shepard    Resident Surgeon: none     Assistant: Claudia Valencia and Sophie Restrepo, both RNs    Trimming required prior to treatment in clinic?: NO   Shepherd skin type: III  Diagnosis/Treatment Reason: Hidradenitis supparativa    Treatment#: 2 (57 passes).    Wavelength(755/1064nm):1064    Location: mons and groin  Fluence: 45  Spot size: 10  Pulse width:20 mS ( mS)   Total Number of Pulses: 99  Dynamic cooling spray settin mS  Dynamic cooling device delay:  30 mS         Anesthesia:  See nursing record, topical    Dixie used?:No  Ice used?: No     Description of Operation/Procedure:    The nature and purpose of the procedure, associated risks, possible consequences, complications and alternative methods of treatment were explained in detail, this includes but is not limited to skin lightening, darkening, blistering, scarring. Reviewed numbing can cause irritation and toxicity.    The use of numbing topicals can help with pain, but does not eliminate pain. We reviewed risk of irritation and toxicity. She denies pregnancy or breastfeeding.    Reviewed that the patient can terminate the procedure at anytime.         A   operative consent were obtained. Time-out was performed.  The patient was positioned to optimally expose the area treated. Dynamic cooling was verified and functioning properly.  Protective eyewear was worn by the patient and goggles on all personnel in the treatment room.  The patient confirmed the site to be treated. The laser energy output was verified by meter reading.  N95 and buffalo filtration was used.     The clinically evident lesion(s) was/were treated with laser beam as above with 1 pass.  The patient tolerated the procedure well and no complications were noted. Post operative instructions were provided. The total laser operation and preparation time was approximately  60minutes.  The  patient was counseled to contact us immediately for any concerns. The patient will use the phone, not my chart, for urgent concerns as we reviewed that mychart messaging is delayed. The patient will avoid the sun in these areas post procedure. The patient was offered and recommended to read their after visit summary.               The patient will follow-up in 4-12 weeks        Staff Involved:  Staff       Danielle Shepard MD    Department of Dermatology  [facility]: Phone: [phone], Fax:[phone]  [facility]: Phone: [phone], Fax: [phone]

## 2022-08-18 LAB
BACTERIA SKIN AEROBE CULT: ABNORMAL
GRAM STAIN RESULT: ABNORMAL
GRAM STAIN RESULT: ABNORMAL

## 2022-08-19 RX ORDER — MUPIROCIN 20 MG/G
OINTMENT TOPICAL
Qty: 30 G | Refills: 0 | Status: SHIPPED | OUTPATIENT
Start: 2022-08-19 | End: 2022-11-28

## 2022-08-24 NOTE — PROGRESS NOTES
[facility] Dermatology Note  Encounter Date: Aug 25, 2022  Office Visit     Dermatology Problem List:  1. Nodulocystic acne  -flare of R forehead 08/25/22 in dermatomal distribution ddx zoster vs Pityrosporum folliculitis vs pyoderma faciale.  -s/p valtrex without improvement  -bacterial culture with normal jonathan  -swabbed for zoster PCR 08/25/22   2. Hidradenitis suppurativa, Beltran stage II  -Current: Stelara, benzoyl peroxide 2.5% wash, clobetasol 0.05% ointment BID PRN to groin  -Past: oral antibiotics (minocycline and rifampin), Humira (migraines), isotretinoin (dryness, headaches and dizziness),  anakinra prescribed (did not start), ustekinumab (did not start)  - ILK did help the inflammatory on cheek  - Prednisone made her worse and made dizziness worse.  - Last quantiferon gold: 2018  - Hep C ab neg 11/3/20  - HIV neg 11/3/20  - Hep B: surface ab +, surface antigen neg c/w immunity 8/12/18  - CBC last wnl 11/3/20  - BMP wnl 12/27/20  3. 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 Hx of Afib after childbirth   5. Wart, left heel  6. Hx of high-risk HPV  7. Seborrheic dermatitis, scalp  - ketoconazole 2% shampoo, lidex 0.05% solution  ____________________________________________    Assessment & Plan:    # HS, Beltran stage II. Improved on exam today with some active disease under right breast and in left groin. Stelara was previously approved by insurance, but patient has previously been hesitant to try systemic therapy.     Discussed pain management options, including gabapentin, but patient deferred at this time. Lastly, discussed benefits and risks of stelara vs dapsone. Patient elected to start stelara.   - Start stelara 90 mg q28 days. Counseled on low risk of side effects. Patient denies having any known allergies.   - We will also obtain baseline labs for dapsone today as below. 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 Bacterial swab grew normal jonathan. Per chart review, patient had similar symptoms with pustules and papules on left cheek in fall/winter 2019 to 2020. Biopsy at this time revealed dense suppurative granulomatous dermatitis with neutrophils. Additionally, discussed possibility of starting another round of Accutane at a low dose for greater duration when compared to previous (she was previously only able to tolerate this medication at 40 mg/day for 1 month due to adverse effects). Patient would like to treat topically at this point and start Stelara as above.  -Clindamycin 1% lotion daily  -Benzoyl peroxide wash  -Varicella Zoster DNA PCR Skin Swab obtained today    # Seborrheic dermatitis, scalp.   - Start ketoconazole 2 % shampoo once/week  - Provided with samples of Head and Shoulder shampoo  - Start lidex 0.05% solution to scalp prn for pruritis       Follow-up: 4 week(s) virtually (telephone with photos), or earlier for new or changing lesions    Staff, Resident Scribe:     Scribe Disclosure:  I, MYRON RANDALL, am serving as a scribe to document services personally performed by Timmy Franco MD based on data collection and the provider's statements to me.     Christiano Melo MD PGY-2  Dermatology Resident  Pager: [phone]     Staff Physician Comments:   I saw and evaluated the patient with the resident and I agree with the assessment and plan.  I was present for the examination.    Timmy Franco MD, FAAD    Departments of Internal Medicine and  "Dermatology  [facility]  [phone]        ____________________________________________    CC: Derm Problem (Sophia is here today for HS follow up. Has a flare on forehead was told they are shingles. They are not healing. )    HPI:  Ms. Sophia De Leon is a(n) 36 year old female who presents today as a return patient for HS follow up. Last seen by Dr. Shepard on 8/16/22, at which time patient underwent laser therapy for treatment of HS/acne. Additionally, patient underwent a culture for treatment of suspected shingles.     Today, patient reports that she is having increasing flares of the HS in her groin region. Nodules, drainage, pain  There is some irritation in her armpits, but no boils. Some flares are present behind ears and on the chest. On the left breast she has a painful cyst that has improved with warm compresses. A mammogram was completed for the suspected cyst.    Additionally, patient notes papules of right forehead. They began as small spots and slowly grew. Patient believes they started after becoming ill in late July. They have been present for >1 month and are very painful and itchy. Some discharge that \"smelled like HS.\" No vision problems with her right eye.    Patient was not able to tolerate minocycle; has been using mupirocen to the area. She also completed a course of valcyclovir without improvement. Denies fevers or chills.     Patient reports regular sensations of vertigo and dizziness.     Patient is otherwise feeling well, without additional skin concerns.    Physical Exam:  Vitals: /61   SKIN: Full skin, which includes the head/face, both arms, chest, back, abdomen,both legs, genitalia and/or groin buttocks, digits and/or nails, was examined.  - Scalp with scaling and minimal erythema  - pustules and nodules on the right forehead  (see photo)    HS Data  HS Exam Data 10/29/2020 8/25/2022   LC Type LC1 LC1   Clinical Subtypes Regular type Regular type   Acne? - Yes " "  Acne Comments - improved from last visit   Dissecting Cellulitis? - No   Total Beltran Stage II II   Total Inflammatory Nodules 3 2   Total Abcesses 0 0   Total Draining Tunnels 2 0   Total Abscess and Nodule Count 3 2   IHS4 Score  11 2   Total  HASI Score 20 13   HS-PGA 4 -     - No other lesions of concern on areas examined.             Medications:  Current Outpatient Medications   Medication     Acetaminophen (TYLENOL PO)     benzoyl peroxide 5 % external liquid     cholecalciferol 50 MCG (2000 UT) CAPS     clindamycin (CLEOCIN T) 1 % external lotion     clotrimazole (LOTRIMIN) 1 % external cream     ferrous gluconate (FERGON) 324 (38 Fe) MG tablet     fluocinonide (LIDEX) 0.05 % external solution     Gauze Pads & Dressings (TELFA NON-ADHERENT) 3\"X4\" PADS     IBUPROFEN PO     ketoconazole (NIZORAL) 2 % external shampoo     loratadine (CLARITIN) 10 MG tablet     minocycline (DYNACIN) 100 MG tablet     minocycline (MINOCIN) 100 MG capsule     mupirocin (BACTROBAN) 2 % external ointment     Riboflavin (B-2) 50 MG TABS     urea (GORDONS UREA) 40 % external ointment     ustekinumab (STELARA) 90 MG/ML     COMPOUND CONTAINING CONTROLLED SUBSTANCE (CMPD RX) - PHARMACY TO MIX COMPOUNDED MEDICATION     ketoconazole (NIZORAL) 2 % external shampoo     Multiple Vitamin (MULTIVITAMIN ADULT PO)     omeprazole (PRILOSEC) 10 MG DR capsule     tacrolimus (PROTOPIC) 0.1 % external ointment     tretinoin (RETIN-A) 0.025 % external cream     Current Facility-Administered Medications   Medication     lidocaine 1% with EPINEPHrine 1:100,000 injection 3 mL      Past Medical History:   Patient Active Problem List   Diagnosis     Acne vulgaris     Hidradenitis suppurativa     Migraine with aura and without status migrainosus, not intractable     No past medical history on file.     CC Referred Self, MD  No address on file on close of this encounter.  "

## 2022-08-25 ENCOUNTER — OFFICE VISIT (OUTPATIENT)
Dept: DERMATOLOGY | Facility: CLINIC | Age: 37
End: 2022-08-25
Payer: COMMERCIAL

## 2022-08-25 VITALS — SYSTOLIC BLOOD PRESSURE: 111 MMHG | DIASTOLIC BLOOD PRESSURE: 61 MMHG

## 2022-08-25 DIAGNOSIS — B02.9 VARICELLA ZOSTER: ICD-10-CM

## 2022-08-25 DIAGNOSIS — L70.0 ACNE VULGARIS: ICD-10-CM

## 2022-08-25 DIAGNOSIS — L73.2 HIDRADENITIS SUPPURATIVA: Primary | ICD-10-CM

## 2022-08-25 DIAGNOSIS — L21.9 DERMATITIS, SEBORRHEIC: ICD-10-CM

## 2022-08-25 PROCEDURE — 99214 OFFICE O/P EST MOD 30 MIN: CPT | Mod: GC | Performed by: DERMATOLOGY

## 2022-08-25 PROCEDURE — 87798 DETECT AGENT NOS DNA AMP: CPT | Mod: 90 | Performed by: PATHOLOGY

## 2022-08-25 PROCEDURE — 99000 SPECIMEN HANDLING OFFICE-LAB: CPT | Performed by: PATHOLOGY

## 2022-08-25 RX ORDER — KETOCONAZOLE 20 MG/ML
SHAMPOO TOPICAL WEEKLY
Qty: 120 ML | Refills: 11 | Status: SHIPPED | OUTPATIENT
Start: 2022-08-25

## 2022-08-25 RX ORDER — FLUOCINONIDE TOPICAL SOLUTION USP, 0.05% 0.5 MG/ML
SOLUTION TOPICAL PRN
Qty: 60 ML | Refills: 3 | Status: SHIPPED | OUTPATIENT
Start: 2022-08-25 | End: 2023-01-30

## 2022-08-25 RX ORDER — FERROUS GLUCONATE 324(38)MG
324 TABLET ORAL
COMMUNITY
Start: 2022-03-25

## 2022-08-25 RX ORDER — THIAMINE HCL 100 MG
1 TABLET ORAL DAILY
COMMUNITY
Start: 2022-06-15 | End: 2024-04-05

## 2022-08-25 RX ORDER — USTEKINUMAB 90 MG/ML
90 INJECTION, SOLUTION SUBCUTANEOUS
Qty: 1 ML | Refills: 3 | OUTPATIENT
Start: 2022-08-25 | End: 2022-11-28

## 2022-08-25 RX ORDER — USTEKINUMAB 45 MG/.5ML
45 INJECTION, SOLUTION SUBCUTANEOUS
Qty: 0.5 ML | Refills: 6 | Status: CANCELLED | OUTPATIENT
Start: 2022-08-25

## 2022-08-25 NOTE — LETTER
8/25/2022       RE: Sophia De Leon  [address] [facility] [address]     Dear Colleague,    Thank you for referring your patient, Sophia De Leon, to the [facility]MAT[facility] [place] at [facility]. Please see a copy of my visit note below.    [facility] Dermatology Note  Encounter Date: Aug 25, 2022  Office Visit     Dermatology Problem List:  1. Nodulocystic acne  -flare of R forehead 08/25/22 in dermatomal distribution ddx zoster vs Pityrosporum folliculitis vs pyoderma faciale.  -s/p valtrex without improvement  -bacterial culture with normal jonathan  -swabbed for zoster PCR 08/25/22   2. Hidradenitis suppurativa, Ruby stage II  -Current: Stelara, benzoyl peroxide 2.5% wash, clobetasol 0.05% ointment BID PRN to groin  -Past: oral antibiotics (minocycline and rifampin), Humira (migraines), isotretinoin (dryness, headaches and dizziness),  anakinra prescribed (did not start), ustekinumab (did not start)  - ILK did help the inflammatory on cheek  - Prednisone made her worse and made dizziness worse.  - Last quantiferon gold: 2018  - Hep C ab neg 11/3/20  - HIV neg 11/3/20  - Hep B: surface ab +, surface antigen neg c/w immunity 8/12/18  - CBC last wnl 11/3/20  - BMP wnl 12/27/20  3. 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 Hx of Afib after childbirth   5. Wart, left heel  6. Hx of high-risk HPV  7. Seborrheic dermatitis, scalp  - ketoconazole 2% shampoo, lidex 0.05% solution  ____________________________________________    Assessment & Plan:    # HS, Ruby stage II. Improved on exam today with some active disease under right breast and in left groin. Stelara was previously approved by insurance, but patient has previously been hesitant to try systemic therapy.     Discussed pain management options, including gabapentin, but patient deferred at this time. Lastly, discussed benefits and  risks of stelara vs dapsone. Patient elected to start stelara.   - Start stelara 90 mg q28 days. Counseled on low risk of side effects. Patient denies having any known allergies.   - We will also obtain baseline labs for dapsone today as below. 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 Bacterial swab grew normal jonathan. Per chart review, patient had similar symptoms with pustules and papules on left cheek in fall/winter 2019 to 2020. Biopsy at this time revealed dense suppurative granulomatous dermatitis with neutrophils. Additionally, discussed possibility of starting another round of Accutane at a low dose for greater duration when compared to previous (she was previously only able to tolerate this medication at 40 mg/day for 1 month due to adverse effects). Patient would like to treat topically at this point and start Stelara as above.  -Clindamycin 1% lotion daily  -Benzoyl peroxide wash  -Varicella Zoster DNA PCR Skin Swab obtained today    # Seborrheic dermatitis, scalp.   - Start ketoconazole 2 % shampoo once/week  - Provided with samples of Head and Shoulder shampoo  - Start lidex 0.05% solution to scalp prn for pruritis       Follow-up: 4 week(s) virtually (telephone with photos), or earlier for new or changing lesions    Staff, Resident Scribe:     Scribe Disclosure:  MYRON GARCIA, am serving as a scribe to document services personally performed by Timmy Franco MD based on data collection and the provider's statements to me.     Christiano  "MD Lorie PGY-2  Dermatology Resident  Pager: [phone]   ____________________________________________    CC: Derm Problem (Sophia is here today for HS follow up. Has a flare on forehead was told they are shingles. They are not healing. )    HPI:  Ms. Sophia De Leon is a(n) 36 year old female who presents today as a return patient for HS follow up. Last seen by Dr. Shepard on 8/16/22, at which time patient underwent laser therapy for treatment of HS/acne. Additionally, patient underwent a culture for treatment of suspected shingles.     Today, patient reports that she is having increasing flares of the HS in her groin region. Nodules, drainage, pain  There is some irritation in her armpits, but no boils. Some flares are present behind ears and on the chest. On the left breast she has a painful cyst that has improved with warm compresses. A mammogram was completed for the suspected cyst.    Additionally, patient notes papules of right forehead. They began as small spots and slowly grew. Patient believes they started after becoming ill in late July. They have been present for >1 month and are very painful and itchy. Some discharge that \"smelled like HS.\" No vision problems with her right eye.    Patient was not able to tolerate minocycle; has been using mupirocen to the area. She also completed a course of valcyclovir without improvement. Denies fevers or chills.     Patient reports regular sensations of vertigo and dizziness.     Patient is otherwise feeling well, without additional skin concerns.    Physical Exam:  Vitals: /61   SKIN: Full skin, which includes the head/face, both arms, chest, back, abdomen,both legs, genitalia and/or groin buttocks, digits and/or nails, was examined.  - Scalp with scaling and minimal erythema  - pustules and nodules on the right forehead  (see photo)    HS Data  HS Exam Data 10/29/2020 8/25/2022   LC Type LC1 LC1   Clinical Subtypes Regular type Regular type   Acne? - " "Yes   Acne Comments - improved from last visit   Dissecting Cellulitis? - No   Total Beltran Stage II II   Total Inflammatory Nodules 3 2   Total Abcesses 0 0   Total Draining Tunnels 2 0   Total Abscess and Nodule Count 3 2   IHS4 Score  11 2   Total  HASI Score 20 13   HS-PGA 4 -     - No other lesions of concern on areas examined.             Medications:  Current Outpatient Medications   Medication     Acetaminophen (TYLENOL PO)     benzoyl peroxide 5 % external liquid     cholecalciferol 50 MCG (2000 UT) CAPS     clindamycin (CLEOCIN T) 1 % external lotion     clotrimazole (LOTRIMIN) 1 % external cream     ferrous gluconate (FERGON) 324 (38 Fe) MG tablet     fluocinonide (LIDEX) 0.05 % external solution     Gauze Pads & Dressings (TELFA NON-ADHERENT) 3\"X4\" PADS     IBUPROFEN PO     ketoconazole (NIZORAL) 2 % external shampoo     loratadine (CLARITIN) 10 MG tablet     minocycline (DYNACIN) 100 MG tablet     minocycline (MINOCIN) 100 MG capsule     mupirocin (BACTROBAN) 2 % external ointment     Riboflavin (B-2) 50 MG TABS     urea (GORDONS UREA) 40 % external ointment     ustekinumab (STELARA) 90 MG/ML     COMPOUND CONTAINING CONTROLLED SUBSTANCE (CMPD RX) - PHARMACY TO MIX COMPOUNDED MEDICATION     ketoconazole (NIZORAL) 2 % external shampoo     Multiple Vitamin (MULTIVITAMIN ADULT PO)     omeprazole (PRILOSEC) 10 MG DR capsule     tacrolimus (PROTOPIC) 0.1 % external ointment     tretinoin (RETIN-A) 0.025 % external cream     Current Facility-Administered Medications   Medication     lidocaine 1% with EPINEPHrine 1:100,000 injection 3 mL      Past Medical History:   Patient Active Problem List   Diagnosis     Acne vulgaris     Hidradenitis suppurativa     Migraine with aura and without status migrainosus, not intractable     No past medical history on file.     CC Referred Self, MD  No address on file on close of this encounter.      Again, thank you for allowing me to participate in the care of your patient.  "     Sincerely,    Timmy Franco MD

## 2022-08-25 NOTE — LETTER
Date:September 15, 2022      Patient was self referred, no letter generated. Do not send.        [facility] Health Information       Would give 1/2 capful miralax in 6-8oz water or juice on Friday night, if no large or soft stool Saturday, repeat Saturday night  Then would start fiber supplement and toilet every 3 hours while at home  [place]- zip code, look around     [address] [place] [address] life coaching- [phone]

## 2022-08-25 NOTE — PROGRESS NOTES
"HS Nurse Assessment    Nurse Assessment Data 10/28/2021 1/27/2022 8/25/2022   Over the past 30 days how many old lesions flared back up? - 3 7   Over the past 30 days how many new lesions did you get? 9 3 1 or more \"depending on what is on my head\"   Over the past week, how many dressing changes do you do each day? 2 2 3+   Over the past week, has your wound drainage been: Moderate Moderate Severe   Rate your HS overall from 0 - 10 (0 = no disease, 10 = worst) over the past week:  9 7-8 9   Rate your pain score from 0 - 10 (0 = no disease, 10 = worst) for the most painful/symptomatic lesion in the past week:  10 - Worst Pain 8 10 - Worst Pain   Over the past week, how much has HS influenced your quality of life? extremely very much extremely   Total DLQI Score - - -           "

## 2022-08-25 NOTE — PATIENT INSTRUCTIONS
Start Stelara  Get labs drawn in case we start Dapsone  Continue topical clindamycin to acne of forehead  Continue salicylic acid wash    Seborrheic dermatitis:  Start ketoconazole shampoo once per week  Start head and shoulders shampoo daily  Start lidex solution as needed for itching of the scalp

## 2022-08-26 LAB — VZV DNA SPEC QL NAA+PROBE: NEGATIVE

## 2022-09-17 ENCOUNTER — HEALTH MAINTENANCE LETTER (OUTPATIENT)
Age: 37
End: 2022-09-17

## 2022-09-21 ENCOUNTER — MYC MEDICAL ADVICE (OUTPATIENT)
Dept: DERMATOLOGY | Facility: CLINIC | Age: 37
End: 2022-09-21

## 2022-10-06 ENCOUNTER — MYC MEDICAL ADVICE (OUTPATIENT)
Dept: DERMATOLOGY | Facility: CLINIC | Age: 37
End: 2022-10-06

## 2022-11-28 ENCOUNTER — VIRTUAL VISIT (OUTPATIENT)
Dept: DERMATOLOGY | Facility: CLINIC | Age: 37
End: 2022-11-28
Payer: COMMERCIAL

## 2022-11-28 DIAGNOSIS — L08.0 PYODERMA FACIALE: Primary | ICD-10-CM

## 2022-11-28 PROCEDURE — 99442 PR PHYSICIAN TELEPHONE EVALUATION 11-20 MIN: CPT | Mod: 93 | Performed by: DERMATOLOGY

## 2022-11-28 RX ORDER — DOXYCYCLINE 100 MG/1
100 CAPSULE ORAL 2 TIMES DAILY
Qty: 180 CAPSULE | Refills: 0 | Status: SHIPPED | OUTPATIENT
Start: 2022-11-28 | End: 2022-12-05

## 2022-11-28 RX ORDER — PREDNISONE 10 MG/1
TABLET ORAL
Qty: 30 TABLET | Refills: 0 | Status: SHIPPED | OUTPATIENT
Start: 2022-11-28 | End: 2023-01-30

## 2022-11-28 NOTE — NURSING NOTE
Chief Complaint   Patient presents with     telephone visit      Flare ups on face painful right side    Teledermatology Nurse Call Patients:     Are you in the [facility] at the time of the encounter? yes    Today's visit will be billed to you and your insurance.    A teledermatology visit is not as thorough as an in-person visit and the quality of the photograph sent may not be of the same quality as that taken by the dermatology clinic.    Annia Dunn,   Virtual Visit Facilitator

## 2022-11-28 NOTE — PROGRESS NOTES
[facility] Dermatology Note  Encounter Date: Nov 28, 2022  Store-and-Forward and Telephone ([phone]). Location of teledermatologist: [facility].  Start time: 4:49pm. End time: 5:12pm.    Dermatology Problem List:  1. Nodulocystic acne  -flare of R forehead 08/25/22 in dermatomal distribution ddx zoster vs Pityrosporum folliculitis vs pyoderma faciale.  -s/p valtrex without improvement  -bacterial culture with normal jonathan  -swabbed for zoster PCR 08/25/22   2. Hidradenitis suppurativa, Beltran stage II  -Current: Stelara, benzoyl peroxide 2.5% wash, clobetasol 0.05% ointment BID PRN to groin  -Past: oral antibiotics (minocycline and rifampin), Humira (migraines), isotretinoin (dryness, headaches and dizziness),  anakinra prescribed (did not start), ustekinumab (did not start)  - ILK did help the inflammatory on cheek  - Prednisone made her worse and made dizziness worse.  - Last quantiferon gold: 2018  - Hep C ab neg 11/3/20  - HIV neg 11/3/20  - Hep B: surface ab +, surface antigen neg c/w immunity 8/12/18  - CBC last wnl 11/3/20  - BMP wnl 12/27/20  3. 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 Hx of Afib after childbirth   5. Wart, left heel  6. Hx of high-risk HPV  7. Seborrheic dermatitis, scalp  - ketoconazole 2% shampoo, lidex 0.05% solution    ____________________________________________    Assessment & Plan:     # Hs, beltran stage II on Ndyag and better. Dr. Franco suggested stelara. Patient decided on dapsone. # pyoderma faciale was started on clindamycin, BP   -hold cefadroxil, do longer course of doxycycline  -prednisone risks including BP, DM, headache/migraine, psyhosis, hyperphagia, blood sugar  -will message Dr. Franco regarding Dapsone  -consider accutane    #pyoderma facial-severe   -hold cefadroxil, do longer course of doxycycline  -prednisone risks including BP, DM, headache/migraine, psyhosis,  hyperphagia, blood sugar-declines higher dosing.     #seb derm  -ketoconazole  -HS shampoo  -lidex solution for pruritus    #subcutaneous nodule evaluating with PCP-going to be thursday    Procedures Performed:    None    Follow-up: with Dr. Franco, will route chart regarding dapsone, last G6pd normal, return for acne in 1 weeks. Earlier for worsening.     Staff:     Danielle Shepard MD    Department of Dermatology  [facility]: Phone: [phone], Fax:[phone]  [facility]: Phone: [phone], Fax: [phone]      ____________________________________________    CC: telephone visit  (Flare ups on face painful right side )    HPI:  Ms. Sophia De Leon is a(n) 36 year old female who presents today as a return patient for clindamycin lotion, BP and tretinoin.     Pt is on menses. CAn be abstinent. On doxycycline.     Denies fevers, chills, does feel achy.      Patient is otherwise feeling well, without additional skin concerns.    Labs Reviewed:  Last g6pd normal    Physical Exam:  Vitals: There were no vitals taken for this visit.  SKIN: Teledermatology photos were reviewed; image quality and interpretability:  acceptable. Image date: today   - nodules/pustules, erythema and crusting  - No other lesions of concern on areas examined.     Medications:  Current Outpatient Medications   Medication     Acetaminophen (TYLENOL PO)     benzoyl peroxide 5 % external liquid     cholecalciferol 50 MCG (2000 UT) CAPS     clindamycin (CLEOCIN T) 1 % external lotion     clotrimazole (LOTRIMIN) 1 % external cream     ferrous gluconate (FERGON) 324 (38 Fe) MG tablet     IBUPROFEN PO     ketoconazole (NIZORAL) 2 % external shampoo     minocycline (DYNACIN) 100 MG tablet     Riboflavin (B-2) 50 MG TABS     ustekinumab (STELARA) 90 MG/ML     COMPOUND CONTAINING CONTROLLED SUBSTANCE (CMPD RX) - PHARMACY TO  "MIX COMPOUNDED MEDICATION     fluocinonide (LIDEX) 0.05 % external solution     Gauze Pads & Dressings (TELFA NON-ADHERENT) 3\"X4\" PADS     ketoconazole (NIZORAL) 2 % external shampoo     loratadine (CLARITIN) 10 MG tablet     minocycline (MINOCIN) 100 MG capsule     Multiple Vitamin (MULTIVITAMIN ADULT PO)     mupirocin (BACTROBAN) 2 % external ointment     omeprazole (PRILOSEC) 10 MG DR capsule     tacrolimus (PROTOPIC) 0.1 % external ointment     tretinoin (RETIN-A) 0.025 % external cream     urea (GORDONS UREA) 40 % external ointment     Current Facility-Administered Medications   Medication     lidocaine 1% with EPINEPHrine 1:100,000 injection 3 mL      Past Medical/Surgical History:   Patient Active Problem List   Diagnosis     Acne vulgaris     Hidradenitis suppurativa     Migraine with aura and without status migrainosus, not intractable     No past medical history on file.    CC No referring provider defined for this encounter. on close of this encounter.  "

## 2022-11-28 NOTE — PATIENT INSTRUCTIONS
[facility] Dermatology Visit    Thank you for allowing us to participate in your care. Your findings, instructions and follow-up plan are as follows:         When should I call my doctor?  If you are worsening or not improving, please, contact us or seek urgent care as noted below.     Who should I call with questions (adults)?  [facility] (adult and pediatric): [phone]  [facility] (adult): [phone]  For urgent needs outside of business hours call the [facility] at [phone] and ask for the dermatology resident on call  If this is a medical emergency and you are unable to reach an ER, Call 911    Who should I call with questions (pediatric)?  [facility]- Pediatric Dermatology  Dr. Diana Huston, Dr. Mahendra Dela Cruz, Dr. Omayra Lerma, Maxine Carter, PA  Dr. Angelique Almonte, Dr. Brenda Kovacs & Dr. Henri Underwood  Non Urgent  Nurse Triage Line; [phone]- Angelica and Jannie RN Care Coordinators   [place] (/Complex ) [phone]    If you need a prescription refill, please contact your pharmacy. Refills are approved or denied by our physicians during normal business hours, Monday through Fridays  Per office policy, refills will not be granted if you have not been seen within the past year (or sooner depending on your child's condition).    Scheduling Information:  Pediatric Appointment Scheduling and Call Center [phone]  Radiology Scheduling- [phone]  Sedation Unit Scheduling- [phone]  [place] Scheduling- General [phone]; Pediatric Dermatology [phone]  Main  Services: [phone]  Cymro: [phone]  Portuguese: [phone]  Hmong/Darren/Vincentian: [phone]  Preadmission Nursing Department Fax Number: [phone] (fax all pre-operative paperwork to this number)    For urgent matters arising during evenings, weekends, or  holidays that cannot wait for normal business hours please call [phone] and ask for the dermatology resident on call to be paged.

## 2022-11-28 NOTE — LETTER
11/28/2022         RE: Sophia De Leon  [address] [facility] [address]        Dear Colleague,    Thank you for referring your patient, Sophia De Leon, to the [facility]. Please see a copy of my visit note below.    [facility] Dermatology Note  Encounter Date: Nov 28, 2022  Store-and-Forward and Telephone ([phone]). Location of teledermatologist: [facility].  Start time: 4:49pm. End time: 5:12pm.    Dermatology Problem List:  1. Nodulocystic acne  -flare of R forehead 08/25/22 in dermatomal distribution ddx zoster vs Pityrosporum folliculitis vs pyoderma faciale.  -s/p valtrex without improvement  -bacterial culture with normal jonathan  -swabbed for zoster PCR 08/25/22   2. Hidradenitis suppurativa, Ruby stage II  -Current: Stelara, benzoyl peroxide 2.5% wash, clobetasol 0.05% ointment BID PRN to groin  -Past: oral antibiotics (minocycline and rifampin), Humira (migraines), isotretinoin (dryness, headaches and dizziness),  anakinra prescribed (did not start), ustekinumab (did not start)  - ILK did help the inflammatory on cheek  - Prednisone made her worse and made dizziness worse.  - Last quantiferon gold: 2018  - Hep C ab neg 11/3/20  - HIV neg 11/3/20  - Hep B: surface ab +, surface antigen neg c/w immunity 8/12/18  - CBC last wnl 11/3/20  - BMP wnl 12/27/20  3. 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 Hx of Afib after childbirth   5. Wart, left heel  6. Hx of high-risk HPV  7. Seborrheic dermatitis, scalp  - ketoconazole 2% shampoo, lidex 0.05% solution    ____________________________________________    Assessment & Plan:     # ruby Dalton stage II on Ndyag and better. Dr. Franco suggested stelara. Patient decided on dapsone. 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 Franco regarding Dapsone  -consider accutane    #pyoderma facial-severe   -hold cefadroxil, do longer course of doxycycline  -prednisone risks including BP, DM, headache/migraine, psyhosis, hyperphagia, blood sugar-declines higher dosing.     #seb derm  -ketoconazole  -HS shampoo  -lidex solution for pruritus    #subcutaneous nodule evaluating with PCP-going to be thursday    Procedures Performed:    None    Follow-up: with Dr. Franco, will route chart regarding dapsone, last G6pd normal, return for acne in 1 weeks. Earlier for worsening.     Staff:     Danielle Shepard MD    Department of Dermatology  [facility]: Phone: [phone], Fax:[phone]  [facility]: Phone: [phone], Fax: [phone]      ____________________________________________    CC: telephone visit  (Flare ups on face painful right side )    HPI:  Ms. Sophia De Leon is a(n) 36 year old female who presents today as a return patient for clindamycin lotion, BP and tretinoin.     Pt is on menses. CAn be abstinent. On doxycycline.     Denies fevers, chills, does feel achy.      Patient is otherwise feeling well, without additional skin concerns.    Labs Reviewed:  Last g6pd normal    Physical Exam:  Vitals: There were no vitals taken for this visit.  SKIN: Teledermatology photos were reviewed; image quality and interpretability:  acceptable. Image date: today   - nodules/pustules, erythema and crusting  - No other lesions of concern on areas examined.     Medications:  Current Outpatient Medications   Medication     Acetaminophen (TYLENOL PO)     benzoyl peroxide 5 % external liquid     cholecalciferol 50 MCG (2000 UT) CAPS     clindamycin (CLEOCIN T) 1 % external lotion     clotrimazole (LOTRIMIN) 1 % external cream     ferrous gluconate (FERGON) 324 (38 Fe) MG  "tablet     IBUPROFEN PO     ketoconazole (NIZORAL) 2 % external shampoo     minocycline (DYNACIN) 100 MG tablet     Riboflavin (B-2) 50 MG TABS     ustekinumab (STELARA) 90 MG/ML     COMPOUND CONTAINING CONTROLLED SUBSTANCE (CMPD RX) - PHARMACY TO MIX COMPOUNDED MEDICATION     fluocinonide (LIDEX) 0.05 % external solution     Gauze Pads & Dressings (TELFA NON-ADHERENT) 3\"X4\" PADS     ketoconazole (NIZORAL) 2 % external shampoo     loratadine (CLARITIN) 10 MG tablet     minocycline (MINOCIN) 100 MG capsule     Multiple Vitamin (MULTIVITAMIN ADULT PO)     mupirocin (BACTROBAN) 2 % external ointment     omeprazole (PRILOSEC) 10 MG DR capsule     tacrolimus (PROTOPIC) 0.1 % external ointment     tretinoin (RETIN-A) 0.025 % external cream     urea (GORDONS UREA) 40 % external ointment     Current Facility-Administered Medications   Medication     lidocaine 1% with EPINEPHrine 1:100,000 injection 3 mL      Past Medical/Surgical History:   Patient Active Problem List   Diagnosis     Acne vulgaris     Hidradenitis suppurativa     Migraine with aura and without status migrainosus, not intractable     No past medical history on file.    CC No referring provider defined for this encounter. on close of this encounter.      Again, thank you for allowing me to participate in the care of your patient.        Sincerely,        Danielle Shepard MD    "

## 2022-12-01 NOTE — PROGRESS NOTES
[facility] Dermatology Note  Encounter Date: Dec 5, 2022  Store-and-Forward and Telephone ([phone] ). Location of teledermatologist: [facility].  Start time: 11:25am End time: 11:38am.    Dermatology Problem List:  1. Nodulocystic acne  -flare of R forehead 08/25/22 in dermatomal distribution ddx zoster vs Pityrosporum folliculitis vs pyoderma faciale.  -s/p valtrex without improvement  -bacterial culture with normal jonathan  -swabbed for zoster PCR 08/25/22   2. Hidradenitis suppurativa, Beltran stage II  -Current: Stelara, benzoyl peroxide 2.5% wash, clobetasol 0.05% ointment BID PRN to groin  -Past: oral antibiotics (minocycline and rifampin), Humira (migraines), isotretinoin (dryness, headaches and dizziness),  anakinra prescribed (did not start), ustekinumab (did not start)  - ILK did help the inflammatory on cheek  - Prednisone made her worse and made dizziness worse.  - Last quantiferon gold: 2018  - Hep C ab neg 11/3/20  - HIV neg 11/3/20  - Hep B: surface ab +, surface antigen neg c/w immunity 8/12/18  - CBC last wnl 11/3/20  - BMP wnl 12/27/20  3. 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 Hx of Afib after childbirth   5. Wart, left heel  6. Hx of high-risk HPV  7. Seborrheic dermatitis, scalp  - ketoconazole 2% shampoo, lidex 0.05% solution     SHX- pt is abstinent  ____________________________________________     Assessment & Plan:      # Hs, beltran stage II on Ndyag and better. Pt declined stelara and was offered dapsone. I reviewed with Dr. Franco and he is okay with start.  Pt declines today  -For dapsone, I revivewed baseline CBC with diff, Hepatic panel, CMP, G6PD  Will repeat CBC with retic count and alt/ast at weeks1, 2, 4, qmonthly, then q3 months when stable. Patient denies prior history of anemia or malignancy. Not currently on sulfa medications.  Denies history of sulfa allergy. Reviewed chest  pain, SOB, GI symptoms, renal disease, or history of neuropathy. birth defects risks reviewed.       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 Photos and phone    Staff and Scribe:     Scribe Disclosure:   I, Sid Schultz, am serving as a scribe to document services personally performed by this physician based on data collection and the provider's statements to me.       Provider Disclosure:   The documentation recorded by the scribe accurately reflects the services I personally performed and the decisions made by me.    Danielle Shepard MD    Department of Dermatology  [facility]: Phone: [phone], Fax:[phone]  [facility]: Phone: [phone], Fax: [phone]        ____________________________________________    CC: Derm Problem (Extreme flare ups on face, painful, right side swollen )    HPI:  Ms. Sophia De Leon is a(n) 36 year old female who presents today as a return patient for dapsone start. Her face is better    Last seen virtually 11/28/22 for HS and facial pyoderma. At that time, patient was instructed to hold cefadroxil, take doxycycline 100 mg twice daily, and begin prednisone taper.     Today, reports that her face is getting better. Feels dizzy, [place], throat discomfort, fatigue.     Is using clotrimazole for vaginal yeast infection. She wants to reduce her doxy to once daily    Is abstinent  Patient is otherwise feeling well, without additional skin concerns.    Labs Reviewed:  See asessment and plan    Physical Exam:  Vitals: There were  no vitals taken for this visit.  SKIN: Teledermatology photos were reviewed; image quality and interpretability:  acceptable. Image date: see upload date  - acneiform papules and nodules scattered on face   - No other lesions of concern on areas examined.     Medications:  Current Outpatient Medications   Medication     Acetaminophen (TYLENOL PO)     benzoyl peroxide 5 % external liquid     cholecalciferol 50 MCG (2000 UT) CAPS     clindamycin (CLEOCIN T) 1 % external lotion     clotrimazole (LOTRIMIN) 1 % external cream     doxycycline hyclate (VIBRAMYCIN) 100 MG capsule     ferrous gluconate (FERGON) 324 (38 Fe) MG tablet     IBUPROFEN PO     ketoconazole (NIZORAL) 2 % external shampoo     predniSONE (DELTASONE) 10 MG tablet     Riboflavin (B-2) 50 MG TABS     fluocinonide (LIDEX) 0.05 % external solution     Current Facility-Administered Medications   Medication     lidocaine 1% with EPINEPHrine 1:100,000 injection 3 mL      Past Medical/Surgical History:   Patient Active Problem List   Diagnosis     Acne vulgaris     Hidradenitis suppurativa     Migraine with aura and without status migrainosus, not intractable     No past medical history on file.    CC Referred Self, MD  No address on file on close of this encounter.

## 2022-12-05 ENCOUNTER — VIRTUAL VISIT (OUTPATIENT)
Dept: DERMATOLOGY | Facility: CLINIC | Age: 37
End: 2022-12-05
Payer: COMMERCIAL

## 2022-12-05 DIAGNOSIS — L08.0 PYODERMA FACIALE: ICD-10-CM

## 2022-12-05 DIAGNOSIS — L73.2 HIDRADENITIS SUPPURATIVA: Primary | ICD-10-CM

## 2022-12-05 DIAGNOSIS — L21.9 DERMATITIS, SEBORRHEIC: ICD-10-CM

## 2022-12-05 PROCEDURE — 99442 PR PHYSICIAN TELEPHONE EVALUATION 11-20 MIN: CPT | Mod: 93 | Performed by: DERMATOLOGY

## 2022-12-05 RX ORDER — AMOXICILLIN 500 MG/1
500 CAPSULE ORAL 2 TIMES DAILY
Qty: 180 CAPSULE | Refills: 0 | Status: SHIPPED | OUTPATIENT
Start: 2022-12-05 | End: 2023-03-27

## 2022-12-05 RX ORDER — AZELAIC ACID 0.15 G/G
GEL TOPICAL
Qty: 50 G | Refills: 3 | Status: SHIPPED | OUTPATIENT
Start: 2022-12-05 | End: 2023-03-27

## 2022-12-05 ASSESSMENT — PATIENT HEALTH QUESTIONNAIRE - PHQ9: SUM OF ALL RESPONSES TO PHQ QUESTIONS 1-9: 21

## 2022-12-05 NOTE — PATIENT INSTRUCTIONS
[facility] Dermatology Visit    Thank you for allowing us to participate in your care. Your findings, instructions and follow-up plan are as follows:           When should I call my doctor?  If you are worsening or not improving, please, contact us or seek urgent care as noted below.     Who should I call with questions (adults)?  [facility] (adult and pediatric): [phone]  [facility] (adult): [phone]  For urgent needs outside of business hours call the [facility] at [phone] and ask for the dermatology resident on call  If this is a medical emergency and you are unable to reach an ER, Call 911    Who should I call with questions (pediatric)?  [facility]- Pediatric Dermatology  Dr. Diana Huston, Dr. Mahendra Dela Cruz, Dr. Omayra Lerma, Maxine Carter, PA  Dr. Angeliqeu Almonte, Dr. Brenda Kovacs & Dr. Henri Underwood  Non Urgent  Nurse Triage Line; [phone]- Angelica and Jannie RN Care Coordinators   [place] (/Complex ) [phone]    If you need a prescription refill, please contact your pharmacy. Refills are approved or denied by our physicians during normal business hours, Monday through Fridays  Per office policy, refills will not be granted if you have not been seen within the past year (or sooner depending on your child's condition).    Scheduling Information:  Pediatric Appointment Scheduling and Call Center [phone]  Radiology Scheduling- [phone]  Sedation Unit Scheduling- [phone]  [place] Scheduling- General [phone]; Pediatric Dermatology [phone]  Main  Services: [phone]  British: [phone]  Italian: [phone]  Hmong/Maori/Maldivian: [phone]  Preadmission Nursing Department Fax Number: [phone] (fax all pre-operative paperwork to this number)    For urgent matters arising during evenings, weekends, or  holidays that cannot wait for normal business hours please call [phone] and ask for the dermatology resident on call to be paged.

## 2022-12-05 NOTE — NURSING NOTE
Chief Complaint   Patient presents with     Derm Problem     Extreme flare ups on face, painful, right side swollen      Teledermatology Nurse Call Patients:     Are you in the [facility] at the time of the encounter? yes    Today's visit will be billed to you and your insurance.    A teledermatology visit is not as thorough as an in-person visit and the quality of the photograph sent may not be of the same quality as that taken by the dermatology clinic.    Depression Response    Patient completed the PHQ-9 assessment for depression and scored >9? Yes  Question 9 on the PHQ-9 was positive for suicidality? No  Does patient have current mental health provider? Yes    Is this a virtual visit? Yes   Does patient have suicidal ideation (positive question 9)? No - offer to place Mental Health Referral.  Patient declined referral/not needed    I personally notified the following: visit provider     Shelby Kocher

## 2022-12-05 NOTE — LETTER
12/5/2022         RE: Sophia De Leon  [address] [facility] [address]        Dear Colleague,    Thank you for referring your patient, Sophia De Leon, to the [facility]. Please see a copy of my visit note below.       [facility] Dermatology Note  Encounter Date: Dec 5, 2022  Store-and-Forward and Telephone ([phone] ). Location of teledermatologist: [facility].  Start time: 11:25am End time: 11:38am.    Dermatology Problem List:  1. Nodulocystic acne  -flare of R forehead 08/25/22 in dermatomal distribution ddx zoster vs Pityrosporum folliculitis vs pyoderma faciale.  -s/p valtrex without improvement  -bacterial culture with normal jonathan  -swabbed for zoster PCR 08/25/22   2. Hidradenitis suppurativa, Beltran stage II  -Current: Stelara, benzoyl peroxide 2.5% wash, clobetasol 0.05% ointment BID PRN to groin  -Past: oral antibiotics (minocycline and rifampin), Humira (migraines), isotretinoin (dryness, headaches and dizziness),  anakinra prescribed (did not start), ustekinumab (did not start)  - ILK did help the inflammatory on cheek  - Prednisone made her worse and made dizziness worse.  - Last quantiferon gold: 2018  - Hep C ab neg 11/3/20  - HIV neg 11/3/20  - Hep B: surface ab +, surface antigen neg c/w immunity 8/12/18  - CBC last wnl 11/3/20  - BMP wnl 12/27/20  3. 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 Hx of Afib after childbirth   5. Wart, left heel  6. Hx of high-risk HPV  7. Seborrheic dermatitis, scalp  - ketoconazole 2% shampoo, lidex 0.05% solution     SHX- pt is abstinent  ____________________________________________     Assessment & Plan:      # Hs, beltran stage II on Ndyag and better. Pt declined stelara and was offered dapsone. I reviewed with Dr. Franco and he is okay with start.  Pt declines today  -For dapsone, I revivewed baseline CBC with diff, Hepatic panel, CMP,  G6PD  Will repeat CBC with retic count and alt/ast at weeks1, 2, 4, qmonthly, then q3 months when stable. Patient denies prior history of anemia or malignancy. Not currently on sulfa medications.  Denies history of sulfa allergy. Reviewed chest pain, SOB, GI symptoms, renal disease, or history of neuropathy. birth defects risks reviewed.       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 Photos and phone    Staff and Scribe:     Scribe Disclosure:   I, Sid Schultz, am serving as a scribe to document services personally performed by this physician based on data collection and the provider's statements to me.       Provider Disclosure:   The documentation recorded by the scribe accurately reflects the services I personally performed and the decisions made by me.    Danielle Shepard MD    Department of Dermatology  [facility]: Phone: [phone], Fax:[phone]  [facility]: Phone: [phone], Fax: [phone]        ____________________________________________    CC: Derm Problem (Extreme flare ups on face, painful, right side swollen )    HPI:  Ms. Sophia De Leon is a(n) 36 year old female who presents today as a return patient for dapsone start. Her face is better    Last seen virtually 11/28/22 for HS and facial pyoderma. At that time, patient was instructed to hold cefadroxil, take doxycycline 100 mg twice daily, and begin prednisone taper.     Today, reports that her face is getting better. Feels dizzy, [place], throat discomfort, fatigue.     Is using  clotrimazole for vaginal yeast infection. She wants to reduce her doxy to once daily    Is abstinent  Patient is otherwise feeling well, without additional skin concerns.    Labs Reviewed:  See asessment and plan    Physical Exam:  Vitals: There were no vitals taken for this visit.  SKIN: Teledermatology photos were reviewed; image quality and interpretability:  acceptable. Image date: see upload date  - acneiform papules and nodules scattered on face   - No other lesions of concern on areas examined.     Medications:  Current Outpatient Medications   Medication     Acetaminophen (TYLENOL PO)     benzoyl peroxide 5 % external liquid     cholecalciferol 50 MCG (2000 UT) CAPS     clindamycin (CLEOCIN T) 1 % external lotion     clotrimazole (LOTRIMIN) 1 % external cream     doxycycline hyclate (VIBRAMYCIN) 100 MG capsule     ferrous gluconate (FERGON) 324 (38 Fe) MG tablet     IBUPROFEN PO     ketoconazole (NIZORAL) 2 % external shampoo     predniSONE (DELTASONE) 10 MG tablet     Riboflavin (B-2) 50 MG TABS     fluocinonide (LIDEX) 0.05 % external solution     Current Facility-Administered Medications   Medication     lidocaine 1% with EPINEPHrine 1:100,000 injection 3 mL      Past Medical/Surgical History:   Patient Active Problem List   Diagnosis     Acne vulgaris     Hidradenitis suppurativa     Migraine with aura and without status migrainosus, not intractable     No past medical history on file.    SASHA Singh MD  No address on file on close of this encounter.      Again, thank you for allowing me to participate in the care of your patient.        Sincerely,        Danielle Shepard MD

## 2022-12-07 ENCOUNTER — TELEPHONE (OUTPATIENT)
Dept: DERMATOLOGY | Facility: CLINIC | Age: 37
End: 2022-12-07

## 2022-12-07 NOTE — TELEPHONE ENCOUNTER
Left Voicemail (1st Attempt) for the patient to call back and schedule the following:    Appointment type: return  Provider: dr. noriega   Return date: 1/2/2023  Specialty phone number: [phone]   Additonal Notes: Return in about 4 weeks (around 1/2/2023) for photos and phone    Ksenia hart Procedure   Orthopedics, Podiatry, Sports Medicine, Ent ,Eye , Audiology, Adult Endocrine & Diabetes, Nutrition & Medication Therapy Management Specialties   [facility]

## 2022-12-09 ENCOUNTER — TELEPHONE (OUTPATIENT)
Dept: DERMATOLOGY | Facility: CLINIC | Age: 37
End: 2022-12-09

## 2022-12-09 NOTE — TELEPHONE ENCOUNTER
[facility] Call Center    Phone Message    May a detailed message be left on voicemail: yes     Reason for Call: Medication Question or concern regarding medication   Prescription Clarification  Name of Medication: fluconazole  Prescribing Provider:    Pharmacy: [id number] - [place] - [address] AT [address]   What on the order needs clarification? Pt called and said that she is currently having a yeast infection due to taking a lot of antibiotics. Pt stated that she is very uncomfortable at the moment and was given this rx in the past and it helped her a lot. Pt said that OTC medication causes a lot of irritation. Please call her back if you have any questions. Thanks        Action Taken: Message routed to:  Adult Clinics: Dermatology p [id number]    Travel Screening: Not Applicable

## 2022-12-09 NOTE — TELEPHONE ENCOUNTER
Writer returned pt's call about wanting Fluconazole prescribed. She stated her doctor in the past has prescribed this for her. Dr. Franco had ordered back in 2020. She asked since  Shepard is prescribing her antibiotics, if she could also order this medication. Writer explained that  Shepard is out sick and since this is not a dermatology medication she should reach out to her primary care doctor or urgent care to prescribe. She denied having any further questions or concerns.    Angelia Abbott RN on 12/9/2022 at 4:14 PM

## 2023-01-30 ENCOUNTER — VIRTUAL VISIT (OUTPATIENT)
Dept: DERMATOLOGY | Facility: CLINIC | Age: 38
End: 2023-01-30
Payer: COMMERCIAL

## 2023-01-30 ENCOUNTER — TELEPHONE (OUTPATIENT)
Dept: DERMATOLOGY | Facility: CLINIC | Age: 38
End: 2023-01-30

## 2023-01-30 DIAGNOSIS — L73.2 HIDRADENITIS SUPPURATIVA: Primary | ICD-10-CM

## 2023-01-30 DIAGNOSIS — Z51.81 MEDICATION MONITORING ENCOUNTER: ICD-10-CM

## 2023-01-30 PROCEDURE — 99443 PR PHYSICIAN TELEPHONE EVALUATION 21-30 MIN: CPT | Mod: 93 | Performed by: DERMATOLOGY

## 2023-01-30 RX ORDER — DAPSONE 25 MG/1
TABLET ORAL
Qty: 120 TABLET | Refills: 1 | Status: SHIPPED | OUTPATIENT
Start: 2023-01-30 | End: 2023-06-05

## 2023-01-30 ASSESSMENT — PATIENT HEALTH QUESTIONNAIRE - PHQ9
SUM OF ALL RESPONSES TO PHQ QUESTIONS 1-9: 17
SUM OF ALL RESPONSES TO PHQ QUESTIONS 1-9: 17
10. IF YOU CHECKED OFF ANY PROBLEMS, HOW DIFFICULT HAVE THESE PROBLEMS MADE IT FOR YOU TO DO YOUR WORK, TAKE CARE OF THINGS AT HOME, OR GET ALONG WITH OTHER PEOPLE: EXTREMELY DIFFICULT

## 2023-01-30 NOTE — NURSING NOTE
Chief Complaint   Patient presents with     telephone visit      Hidradenitis Supputativa   Teledermatology Nurse Call Patients:     Are you in the [facility] at the time of the encounter? yes    Today's visit will be billed to you and your insurance.    A teledermatology visit is not as thorough as an in-person visit and the quality of the photograph sent may not be of the same quality as that taken by the dermatology clinic.    Annia Dunn,   Virtual Visit Facilitator

## 2023-01-30 NOTE — PATIENT INSTRUCTIONS
[facility] Dermatology Visit    Thank you for allowing us to participate in your care. Your findings, instructions and follow-up plan are as follows:           When should I call my doctor?  If you are worsening or not improving, please, contact us or seek urgent care as noted below.     Who should I call with questions (adults)?  [facility] (adult and pediatric): [phone]  [facility] (adult): [phone]  For urgent needs outside of business hours call the [facility] at [phone] and ask for the dermatology resident on call  If this is a medical emergency and you are unable to reach an ER, Call 911    Who should I call with questions (pediatric)?  [facility]- Pediatric Dermatology  Dr. Diana Huston, Dr. Mahendra Dela Cruz, Dr. Omayra Lerma, Maxine Carter, PA  Dr. Angelique Almonte, Dr. Brenda Kovacs & Dr. Henri Underwood  Non Urgent  Nurse Triage Line; [phone]- Angelica and Jannie RN Care Coordinators   [place] (/Complex ) [phone]    If you need a prescription refill, please contact your pharmacy. Refills are approved or denied by our physicians during normal business hours, Monday through Fridays  Per office policy, refills will not be granted if you have not been seen within the past year (or sooner depending on your child's condition).    Scheduling Information:  Pediatric Appointment Scheduling and Call Center [phone]  Radiology Scheduling- [phone]  Sedation Unit Scheduling- [phone]  [place] Scheduling- General [phone]; Pediatric Dermatology [phone]  Main  Services: [phone]  Austrian: [phone]  [place] Islander: [phone]  Hmong/Croatian/Micronesian: [phone]  Preadmission Nursing Department Fax Number: [phone] (fax all pre-operative paperwork to this number)    For urgent matters arising during evenings, weekends, or  holidays that cannot wait for normal business hours please call [phone] and ask for the dermatology resident on call to be paged.

## 2023-01-30 NOTE — TELEPHONE ENCOUNTER
Talked with patient today to schedule their follow-up with Dr. Shepard.   Follow-up was scheduled per provider checkout notes.     Annia Dunn,   Virtual Visit Facilitator

## 2023-01-30 NOTE — LETTER
1/30/2023         RE: Sophia De Leon  [address] [facility] [address]        Dear Colleague,    Thank you for referring your patient, Sophia De Leon, to the [facility]. Please see a copy of my visit note below.    [facility] Dermatology Note  Encounter Date: Jan 30, 2023  Store-and-Forward and Telephone ([phone]). Location of teledermatologist: [facility].  Start time: 10:03am. End time: 10:24am.    Dermatology Problem List:  1. Nodulocystic acne  -flare of R forehead 08/25/22 in dermatomal distribution ddx zoster vs Pityrosporum folliculitis vs pyoderma faciale.  -s/p valtrex without improvement  -bacterial culture with normal jonathan  -swabbed for zoster PCR 08/25/22   2. Hidradenitis suppurativa, Beltran stage II  -Current: Stelara, benzoyl peroxide 2.5% wash, clobetasol 0.05% ointment BID PRN to groin  -Past: oral antibiotics (minocycline and rifampin), Humira (migraines), isotretinoin (dryness, headaches and dizziness),  anakinra prescribed (did not start), ustekinumab (did not start)  - ILK did help the inflammatory on cheek  - Prednisone made her worse and made dizziness worse.  - Last quantiferon gold: 2018  - Hep C ab neg 11/3/20  - HIV neg 11/3/20  - Hep B: surface ab +, surface antigen neg c/w immunity 8/12/18  - CBC last wnl 11/3/20  - BMP wnl 12/27/20  3. 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 Hx of Afib after childbirth   5. Wart, left heel  6. Hx of high-risk HPV  7. Seborrheic dermatitis, scalp  - ketoconazole 2% shampoo, lidex 0.05% solution     SHX- pt using condoms    ____________________________________________    Assessment & Plan:     # Hs, beltran stage II on Ndyag and better. Pt reports still flaring. She wants to start dapsone, reviewed liver, blood count, need for repeat labs, last menses 1/23, reviewed pregnancy rsiks. Reviewed GI symptoms, SOB, chest pain.    -dapsone 50mg for 1 week, check labs, increase to 100mg daily if normal  -isotretinoin, dryness, headaches, dizziness in the past but could reconsider with monitoring from eye clinic if eye pressures       # pyoderma faciale -stable, declines prednisone, did take on course and got better, no systemic symptoms  -complete prednisone  - on amoxicillin once daily, increase to twice daily   -BP recommended, she declines clindamycin due to irritation, adding azelaic acid, reviewed risk of irritation     #seb derm-no change to plan  -pt feels itchin lida lidex, will hold, ketoconazole/HS shampoo    Procedures Performed:    None    Follow-up: in 1 months, 1 weeks for labs    Staff:     Danielle Shepard MD    Department of Dermatology  [facility]: Phone: [phone], Fax:[phone]  [facility]: Phone: [phone], Fax: [phone]      ____________________________________________    CC: telephone visit  (Hidradenitis Supputativa)    HPI:  Ms. Sophia De Leon is a(n) 37 year old female who presents today as a return patient for acne, feels the same. Does not want prednisone.  She had some GI upset on amoxicillin twice daily but tolerates once daily.     Wants dapsone  Declines ocps and spirnolactone    Does not want prednsioen    Patient is otherwise feeling well, without additional skin concerns.    Labs Reviewed:  NA    Physical Exam:  Vitals: There were no vitals taken for this visit.  SKIN: Teledermatology photos were reviewed; image quality and interpretability: acceptable. Image date: 1/26/2022  - acneiform papules on the face, pustules, nodules on face and also neck  - No other lesions of concern on areas examined.     Medications:  Current Outpatient Medications   Medication     Acetaminophen (TYLENOL PO)     amoxicillin (AMOXIL) 500 MG capsule     azelaic acid (FINACIA) 15 % external gel      benzoyl peroxide 5 % external liquid     cholecalciferol 50 MCG (2000 UT) CAPS     clindamycin (CLEOCIN T) 1 % external lotion     clotrimazole (LOTRIMIN) 1 % external cream     dapsone (ACZONE) 25 MG tablet     ferrous gluconate (FERGON) 324 (38 Fe) MG tablet     IBUPROFEN PO     ketoconazole (NIZORAL) 2 % external shampoo     Riboflavin (B-2) 50 MG TABS     Current Facility-Administered Medications   Medication     lidocaine 1% with EPINEPHrine 1:100,000 injection 3 mL      Past Medical/Surgical History:   Patient Active Problem List   Diagnosis     Acne vulgaris     Hidradenitis suppurativa     Migraine with aura and without status migrainosus, not intractable     No past medical history on file.    CC Referred Self, MD  No address on file on close of this encounter.      Again, thank you for allowing me to participate in the care of your patient.        Sincerely,        Danielle Shepard MD

## 2023-01-30 NOTE — PROGRESS NOTES
[facility] Dermatology Note  Encounter Date: Jan 30, 2023  Store-and-Forward and Telephone ([phone]). Location of teledermatologist: [facility].  Start time: 10:03am. End time: 10:24am.    Dermatology Problem List:  1. Nodulocystic acne  -flare of R forehead 08/25/22 in dermatomal distribution ddx zoster vs Pityrosporum folliculitis vs pyoderma faciale.  -s/p valtrex without improvement  -bacterial culture with normal jonathan  -swabbed for zoster PCR 08/25/22   2. Hidradenitis suppurativa, Beltran stage II  -Current: Stelara, benzoyl peroxide 2.5% wash, clobetasol 0.05% ointment BID PRN to groin  -Past: oral antibiotics (minocycline and rifampin), Humira (migraines), isotretinoin (dryness, headaches and dizziness),  anakinra prescribed (did not start), ustekinumab (did not start)  - ILK did help the inflammatory on cheek  - Prednisone made her worse and made dizziness worse.  - Last quantiferon gold: 2018  - Hep C ab neg 11/3/20  - HIV neg 11/3/20  - Hep B: surface ab +, surface antigen neg c/w immunity 8/12/18  - CBC last wnl 11/3/20  - BMP wnl 12/27/20  3. 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 Hx of Afib after childbirth   5. Wart, left heel  6. Hx of high-risk HPV  7. Seborrheic dermatitis, scalp  - ketoconazole 2% shampoo, lidex 0.05% solution     SHX- pt using condoms    ____________________________________________    Assessment & Plan:     # Hs, jeannie stage II on Ndyag and better. Pt reports still flaring. She wants to start dapsone, reviewed liver, blood count, need for repeat labs, last menses 1/23, reviewed pregnancy rsiks. Reviewed GI symptoms, SOB, chest pain.   -dapsone 50mg for 1 week, check labs, increase to 100mg daily if normal  -isotretinoin, dryness, headaches, dizziness in the past but could reconsider with monitoring from eye clinic if eye pressures       # pyoderma faciale -stable, declines  prednisone, did take on course and got better, no systemic symptoms  -complete prednisone  - on amoxicillin once daily, increase to twice daily   -BP recommended, she declines clindamycin due to irritation, adding azelaic acid, reviewed risk of irritation     #seb derm-no change to plan  -pt feels itchin lida lidex, will hold, ketoconazole/HS shampoo    Procedures Performed:    None    Follow-up: in 1 months, 1 weeks for labs    Staff:     Danielle Shepard MD    Department of Dermatology  [facility]: Phone: [phone], Fax:[phone]  [facility]: Phone: [phone], Fax: [phone]      ____________________________________________    CC: telephone visit  (Hidradenitis Supputativa)    HPI:  Ms. Sophia De Leon is a(n) 37 year old female who presents today as a return patient for acne, feels the same. Does not want prednisone.  She had some GI upset on amoxicillin twice daily but tolerates once daily.     Wants dapsone  Declines ocps and spirnolactone    Does not want prednsioen    Patient is otherwise feeling well, without additional skin concerns.    Labs Reviewed:  NA    Physical Exam:  Vitals: There were no vitals taken for this visit.  SKIN: Teledermatology photos were reviewed; image quality and interpretability: acceptable. Image date: 1/26/2022  - acneiform papules on the face, pustules, nodules on face and also neck  - No other lesions of concern on areas examined.     Medications:  Current Outpatient Medications   Medication     Acetaminophen (TYLENOL PO)     amoxicillin (AMOXIL) 500 MG capsule     azelaic acid (FINACIA) 15 % external gel     benzoyl peroxide 5 % external liquid     cholecalciferol 50 MCG (2000 UT) CAPS     clindamycin (CLEOCIN T) 1 % external lotion     clotrimazole (LOTRIMIN) 1 % external cream     dapsone (ACZONE) 25 MG tablet     ferrous gluconate (FERGON) 324  (38 Fe) MG tablet     IBUPROFEN PO     ketoconazole (NIZORAL) 2 % external shampoo     Riboflavin (B-2) 50 MG TABS     Current Facility-Administered Medications   Medication     lidocaine 1% with EPINEPHrine 1:100,000 injection 3 mL      Past Medical/Surgical History:   Patient Active Problem List   Diagnosis     Acne vulgaris     Hidradenitis suppurativa     Migraine with aura and without status migrainosus, not intractable     No past medical history on file.    CC Referred Self, MD  No address on file on close of this encounter.

## 2023-02-27 ENCOUNTER — VIRTUAL VISIT (OUTPATIENT)
Dept: DERMATOLOGY | Facility: CLINIC | Age: 38
End: 2023-02-27
Payer: COMMERCIAL

## 2023-02-27 DIAGNOSIS — L73.2 HIDRADENITIS SUPPURATIVA: Primary | ICD-10-CM

## 2023-02-27 PROCEDURE — 99442 PR PHYSICIAN TELEPHONE EVALUATION 11-20 MIN: CPT | Mod: 93 | Performed by: DERMATOLOGY

## 2023-02-27 NOTE — PROGRESS NOTES
"[facility] Dermatology Note  Encounter Date: Feb 27, 2023  Store-and-Forward and Telephone ([phone] ). Location of teledermatologist: [facility].  Start time: 10:5am] End time: 11:12am.    Dermatology Problem List:  1. Nodulocystic acne  -flare of R forehead 08/25/22 in dermatomal distribution ddx zoster vs Pityrosporum folliculitis vs pyoderma faciale.  -s/p valtrex without improvement  -bacterial culture with normal jonathan  -swabbed for zoster PCR 08/25/22   2. Hidradenitis suppurativa, Beltran stage II  -Current: Stelara, benzoyl peroxide 2.5% wash, clobetasol 0.05% ointment BID PRN to groin  -Past: oral antibiotics (minocycline and rifampin), Humira (migraines), isotretinoin (dryness, headaches and dizziness),  anakinra prescribed (did not start), ustekinumab (did not start)  - ILK did help the inflammatory on cheek  - Prednisone made her worse and made dizziness worse.  - Last quantiferon gold: 2018  - Hep C ab neg 11/3/20  - HIV neg 11/3/20  - Hep B: surface ab +, surface antigen neg c/w immunity 8/12/18  - CBC last wnl 11/3/20  - BMP wnl 12/27/20  3. 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 Hx of Afib after childbirth   5. Wart, left heel  6. Hx of high-risk HPV  7. Seborrheic dermatitis, scalp  - ketoconazole 2% shampoo, lidex 0.05% solution     SHX- pt using condoms- verified again today  ____________________________________________    Assessment & Plan:     #Hs, from Dr. Franco  clinic. Beltran stage II in the past. Improves with Nd:yag. Never started dapsone  -dapsone - reviewed pregnancy risks in detail. Reviewed need for labs within 7-10 days first dose  -amoxicillin recommend complete until June when dapsone will work  -refuses prednisone  -future from last record : \"-isotretinoin, dryness, headaches, dizziness in the past but could reconsider with monitoring from eye clinic if eye " "pressures \"     # pyoderma faciale -stable, declines prednisone, did take on course and got better, no systemic symptoms  -complete prednisone- pt is better to stable but still severe and declines prednisone  - on amoxicillin once daily, increase to twice daily   -BP recommended, azleic acid(declines clindamycin)  -same plan as above    Procedures Performed:    None    Follow-up: 4 weeks and in clinic for laser, want to add axillae    Staff:     Danielle Shepard MD    Department of Dermatology  [facility]: Phone: [phone], Fax:[phone]  [facility]: Phone: [phone], Fax: [phone]      ____________________________________________    CC: Follow Up (Follow up per patient)    HPI:  Ms. Sophia De Leon is a(n) 37 year old female who presents today as a return patient for HS and pyoderma faciale    Doing well on amoxicillin. Her baseline dizziness is not different    GI system feels stable    Starting dapsone      Patient is otherwise feeling well, without additional skin concerns.    Labs Reviewed:       Physical Exam:  Vitals: There were no vitals taken for this visit.  SKIN: Teledermatology photos were reviewed; image quality and interpretability:  acceptable. Image date: taken within 1 week  - erythemaotus papules and plaques with pustules on face  - No other lesions of concern on areas examined.     Medications:  Current Outpatient Medications   Medication     Acetaminophen (TYLENOL PO)     amoxicillin (AMOXIL) 500 MG capsule     azelaic acid (FINACIA) 15 % external gel     benzoyl peroxide 5 % external liquid     cholecalciferol 50 MCG (2000 UT) CAPS     clindamycin (CLEOCIN T) 1 % external lotion     clotrimazole (LOTRIMIN) 1 % external cream     dapsone (ACZONE) 25 MG tablet     ferrous gluconate (FERGON) 324 (38 Fe) MG tablet     IBUPROFEN PO     ketoconazole (NIZORAL) 2 % " external shampoo     Riboflavin (B-2) 50 MG TABS     Current Facility-Administered Medications   Medication     lidocaine 1% with EPINEPHrine 1:100,000 injection 3 mL      Past Medical/Surgical History:   Patient Active Problem List   Diagnosis     Acne vulgaris     Hidradenitis suppurativa     Migraine with aura and without status migrainosus, not intractable     No past medical history on file.    CC Referred Self, MD  No address on file on close of this encounter.

## 2023-02-27 NOTE — LETTER
2/27/2023         RE: Sophia De Leon  [address] [facility] [address]        Dear Colleague,    Thank you for referring your patient, Sophia De Leon, to the [facility]. Please see a copy of my visit note below.    [facility] Dermatology Note  Encounter Date: Feb 27, 2023  Store-and-Forward and Telephone ([phone] ). Location of teledermatologist: [facility].  Start time: 10:5am] End time: 11:12am.    Dermatology Problem List:  1. Nodulocystic acne  -flare of R forehead 08/25/22 in dermatomal distribution ddx zoster vs Pityrosporum folliculitis vs pyoderma faciale.  -s/p valtrex without improvement  -bacterial culture with normal jonathan  -swabbed for zoster PCR 08/25/22   2. Hidradenitis suppurativa, Beltran stage II  -Current: Stelara, benzoyl peroxide 2.5% wash, clobetasol 0.05% ointment BID PRN to groin  -Past: oral antibiotics (minocycline and rifampin), Humira (migraines), isotretinoin (dryness, headaches and dizziness),  anakinra prescribed (did not start), ustekinumab (did not start)  - ILK did help the inflammatory on cheek  - Prednisone made her worse and made dizziness worse.  - Last quantiferon gold: 2018  - Hep C ab neg 11/3/20  - HIV neg 11/3/20  - Hep B: surface ab +, surface antigen neg c/w immunity 8/12/18  - CBC last wnl 11/3/20  - BMP wnl 12/27/20  3. 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 Hx of Afib after childbirth   5. Wart, left heel  6. Hx of high-risk HPV  7. Seborrheic dermatitis, scalp  - ketoconazole 2% shampoo, lidex 0.05% solution     SHX- pt using condoms- verified again today  ____________________________________________    Assessment & Plan:     #Hs, from Dr. Franco  clinic. Beltran stage II in the past. Improves with Nd:yag. Never started dapsone  -dapsone - reviewed pregnancy risks in detail. Reviewed need for labs within 7-10 days first  "dose  -amoxicillin recommend complete until June when dapsone will work  -refuses prednisone  -future from last record : \"-isotretinoin, dryness, headaches, dizziness in the past but could reconsider with monitoring from eye clinic if eye pressures \"     # pyoderma faciale -stable, declines prednisone, did take on course and got better, no systemic symptoms  -complete prednisone- pt is better to stable but still severe and declines prednisone  - on amoxicillin once daily, increase to twice daily   -BP recommended, azleic acid(declines clindamycin)  -same plan as above    Procedures Performed:    None    Follow-up: 4 weeks and in clinic for laser, want to add axillae    Staff:     Danielle Shepard MD    Department of Dermatology  [facility]: Phone: [phone], Fax:[phone]  [facility]: Phone: [phone], Fax: [phone]      ____________________________________________    CC: Follow Up (Follow up per patient)    HPI:  Ms. Sophia De Leon is a(n) 37 year old female who presents today as a return patient for HS and pyoderma faciale    Doing well on amoxicillin. Her baseline dizziness is not different    GI system feels stable    Starting dapsone      Patient is otherwise feeling well, without additional skin concerns.    Labs Reviewed:       Physical Exam:  Vitals: There were no vitals taken for this visit.  SKIN: Teledermatology photos were reviewed; image quality and interpretability:  acceptable. Image date: taken within 1 week  - erythemaotus papules and plaques with pustules on face  - No other lesions of concern on areas examined.     Medications:  Current Outpatient Medications   Medication     Acetaminophen (TYLENOL PO)     amoxicillin (AMOXIL) 500 MG capsule     azelaic acid (FINACIA) 15 % external gel     benzoyl peroxide 5 % external liquid     cholecalciferol 50 MCG (2000 UT) " CAPS     clindamycin (CLEOCIN T) 1 % external lotion     clotrimazole (LOTRIMIN) 1 % external cream     dapsone (ACZONE) 25 MG tablet     ferrous gluconate (FERGON) 324 (38 Fe) MG tablet     IBUPROFEN PO     ketoconazole (NIZORAL) 2 % external shampoo     Riboflavin (B-2) 50 MG TABS     Current Facility-Administered Medications   Medication     lidocaine 1% with EPINEPHrine 1:100,000 injection 3 mL      Past Medical/Surgical History:   Patient Active Problem List   Diagnosis     Acne vulgaris     Hidradenitis suppurativa     Migraine with aura and without status migrainosus, not intractable     No past medical history on file.    CC Referred Self, MD  No address on file on close of this encounter.        Again, thank you for allowing me to participate in the care of your patient.        Sincerely,        Danielle Shepard MD

## 2023-02-27 NOTE — NURSING NOTE
Chief Complaint   Patient presents with     Follow Up     Follow up per patient     Teledermatology Nurse Call Patients:     Are you in the [facility] at the time of the encounter? yes    Today's visit will be billed to you and your insurance.    A teledermatology visit is not as thorough as an in-person visit and the quality of the photograph sent may not be of the same quality as that taken by the dermatology clinic.    Shelby Kocher

## 2023-02-28 ENCOUNTER — TELEPHONE (OUTPATIENT)
Dept: DERMATOLOGY | Facility: CLINIC | Age: 38
End: 2023-02-28
Payer: COMMERCIAL

## 2023-02-28 ENCOUNTER — MYC MEDICAL ADVICE (OUTPATIENT)
Dept: DERMATOLOGY | Facility: CLINIC | Age: 38
End: 2023-02-28
Payer: COMMERCIAL

## 2023-02-28 NOTE — TELEPHONE ENCOUNTER
PB DOS: TBD  Type of Procedure: NdYag laser treatment   CPT Codes: 34147   # of treatments requested:  10  Sites: mons and groin  ICD10 Codes: L73.2   hidradenitis suppurativa  Surgeon/Ordering provider: Timmy Franco [id number]  Pre-cert/Authorization completed:  PA not required   [facility]ayer[facility]      Spoke to [facility] PA list          This is not a guarantee of payment. Payment is subject to the patient's plan benefits and eligibility at the time services are rendered.

## 2023-02-28 NOTE — TELEPHONE ENCOUNTER
Financial Counselor Review:  Please submit for new calendar year    Please submit letter of medical necessity from Dr. Franco dated 11/29/20  to insurance.  Procedure to be performed (include CPT code):  NdYag laser, 42720  Diagnosis:  hidradenitis suppurativa  ICD-10 code:  L73.2  # of treatments requested:  10  Sites: mons and groin

## 2023-03-21 ENCOUNTER — MYC MEDICAL ADVICE (OUTPATIENT)
Dept: DERMATOLOGY | Facility: CLINIC | Age: 38
End: 2023-03-21
Payer: COMMERCIAL

## 2023-03-22 ENCOUNTER — LAB (OUTPATIENT)
Dept: LAB | Facility: CLINIC | Age: 38
End: 2023-03-22
Payer: COMMERCIAL

## 2023-03-22 DIAGNOSIS — Z51.81 MEDICATION MONITORING ENCOUNTER: ICD-10-CM

## 2023-03-22 LAB
BASOPHILS # BLD AUTO: 0 10E3/UL (ref 0–0.2)
BASOPHILS NFR BLD AUTO: 1 %
EOSINOPHIL # BLD AUTO: 0.1 10E3/UL (ref 0–0.7)
EOSINOPHIL NFR BLD AUTO: 2 %
ERYTHROCYTE [DISTWIDTH] IN BLOOD BY AUTOMATED COUNT: 13.7 % (ref 10–15)
HCT VFR BLD AUTO: 35 % (ref 35–47)
HGB BLD-MCNC: 10.9 G/DL (ref 11.7–15.7)
LYMPHOCYTES # BLD AUTO: 1.4 10E3/UL (ref 0.8–5.3)
LYMPHOCYTES NFR BLD AUTO: 23 %
MCH RBC QN AUTO: 25 PG (ref 26.5–33)
MCHC RBC AUTO-ENTMCNC: 31.1 G/DL (ref 31.5–36.5)
MCV RBC AUTO: 80 FL (ref 78–100)
MONOCYTES # BLD AUTO: 0.5 10E3/UL (ref 0–1.3)
MONOCYTES NFR BLD AUTO: 8 %
NEUTROPHILS # BLD AUTO: 3.9 10E3/UL (ref 1.6–8.3)
NEUTROPHILS NFR BLD AUTO: 66 %
PLATELET # BLD AUTO: 248 10E3/UL (ref 150–450)
RBC # BLD AUTO: 4.36 10E6/UL (ref 3.8–5.2)
RETICS # AUTO: 0.07 10E6/UL (ref 0.03–0.1)
RETICS/RBC NFR AUTO: 1.6 % (ref 0.5–2)
WBC # BLD AUTO: 6 10E3/UL (ref 4–11)

## 2023-03-22 PROCEDURE — 85045 AUTOMATED RETICULOCYTE COUNT: CPT

## 2023-03-22 PROCEDURE — 84702 CHORIONIC GONADOTROPIN TEST: CPT

## 2023-03-22 PROCEDURE — 84450 TRANSFERASE (AST) (SGOT): CPT

## 2023-03-22 PROCEDURE — 36415 COLL VENOUS BLD VENIPUNCTURE: CPT

## 2023-03-22 PROCEDURE — 85025 COMPLETE CBC W/AUTO DIFF WBC: CPT

## 2023-03-22 PROCEDURE — 84460 ALANINE AMINO (ALT) (SGPT): CPT

## 2023-03-23 LAB
ALT SERPL W P-5'-P-CCNC: 35 U/L (ref 0–50)
AST SERPL W P-5'-P-CCNC: 22 U/L (ref 0–45)
B-HCG SERPL-ACNC: <1 IU/L (ref 0–5)

## 2023-03-27 ENCOUNTER — VIRTUAL VISIT (OUTPATIENT)
Dept: DERMATOLOGY | Facility: CLINIC | Age: 38
End: 2023-03-27
Payer: COMMERCIAL

## 2023-03-27 DIAGNOSIS — Z51.81 MEDICATION MONITORING ENCOUNTER: Primary | ICD-10-CM

## 2023-03-27 DIAGNOSIS — L70.0 ACNE VULGARIS: ICD-10-CM

## 2023-03-27 DIAGNOSIS — L08.0 PYODERMA FACIALE: ICD-10-CM

## 2023-03-27 DIAGNOSIS — L73.2 HIDRADENITIS SUPPURATIVA: ICD-10-CM

## 2023-03-27 PROCEDURE — 99442 PR PHYSICIAN TELEPHONE EVALUATION 11-20 MIN: CPT | Mod: 93 | Performed by: DERMATOLOGY

## 2023-03-27 RX ORDER — AMOXICILLIN 500 MG/1
500 CAPSULE ORAL 2 TIMES DAILY
Qty: 60 CAPSULE | Refills: 1 | Status: SHIPPED | OUTPATIENT
Start: 2023-03-27 | End: 2023-06-05

## 2023-03-27 RX ORDER — AZELAIC ACID 0.15 G/G
GEL TOPICAL
Qty: 50 G | Refills: 3 | Status: SHIPPED | OUTPATIENT
Start: 2023-03-27 | End: 2023-06-05

## 2023-03-27 RX ORDER — TRETINOIN 0.25 MG/G
CREAM TOPICAL
Qty: 45 G | Refills: 3 | Status: SHIPPED | OUTPATIENT
Start: 2023-03-27 | End: 2023-06-05

## 2023-03-27 RX ORDER — AMOXICILLIN 500 MG/1
500 CAPSULE ORAL 2 TIMES DAILY
Qty: 60 CAPSULE | Refills: 0 | Status: SHIPPED | OUTPATIENT
Start: 2023-03-27 | End: 2023-03-27

## 2023-03-27 RX ORDER — TRIAMCINOLONE ACETONIDE 1 MG/G
CREAM TOPICAL
Qty: 45 G | Refills: 1 | Status: SHIPPED | OUTPATIENT
Start: 2023-03-27 | End: 2023-06-05

## 2023-03-27 ASSESSMENT — PATIENT HEALTH QUESTIONNAIRE - PHQ9: SUM OF ALL RESPONSES TO PHQ QUESTIONS 1-9: 22

## 2023-03-27 NOTE — PATIENT INSTRUCTIONS
[facility] Dermatology Visit    Thank you for allowing us to participate in your care. Your findings, instructions and follow-up plan are as follows:          When should I call my doctor?  If you are worsening or not improving, please, contact us or seek urgent care as noted below.     Who should I call with questions (adults)?  [facility] (adult and pediatric): [phone]  [facility] (adult): [phone]  For urgent needs outside of business hours call the [facility] at [phone] and ask for the dermatology resident on call  If this is a medical emergency and you are unable to reach an ER, Call 911    Who should I call with questions (pediatric)?  [facility]- Pediatric Dermatology  Dr. Diana Huston, Dr. Mahendra Dela Cruz, Dr. Omayra Lerma, Maxine Carter, PA  Dr. Angelique Almonte, Dr. Brenda Kovacs & Dr. Henri Underwood  Non Urgent  Nurse Triage Line; [phone]- Angelica and Jannie RN Care Coordinators   [place] (/Complex ) [phone]    If you need a prescription refill, please contact your pharmacy. Refills are approved or denied by our physicians during normal business hours, Monday through Fridays  Per office policy, refills will not be granted if you have not been seen within the past year (or sooner depending on your child's condition).    Scheduling Information:  Pediatric Appointment Scheduling and Call Center [phone]  Radiology Scheduling- [phone]  Sedation Unit Scheduling- [phone]  [place] Scheduling- General [phone]; Pediatric Dermatology [phone]  Main  Services: [phone]  Nepali: [phone]  Anguillan: [phone]  Hmong/Portuguese/Persian: [phone]  Preadmission Nursing Department Fax Number: [phone] (fax all pre-operative paperwork to this number)    For urgent matters arising during evenings, weekends, or  holidays that cannot wait for normal business hours please call [phone] and ask for the dermatology resident on call to be paged.

## 2023-03-27 NOTE — PROGRESS NOTES
"[facility] Dermatology Note  Encounter Date: Mar 27, 2023  Store-and-Forward and Telephone ([phone] ). Location of teledermatologist: [facility].  Start time:  12:21pm. End time: 12:33pm.        Dermatology Problem List:  1. Nodulocystic acne  -flare of R forehead 08/25/22 in dermatomal distribution ddx zoster vs Pityrosporum folliculitis vs pyoderma faciale.  -s/p valtrex without improvement  -bacterial culture with normal jonathan  -swabbed for zoster PCR 08/25/22   2. Hidradenitis suppurativa, Beltran stage II  -Current: Stelara, benzoyl peroxide 2.5% wash, clobetasol 0.05% ointment BID PRN to groin  -Past: oral antibiotics (minocycline and rifampin), Humira (migraines), isotretinoin (dryness, headaches and dizziness),  anakinra prescribed (did not start), ustekinumab (did not start)  - ILK did help the inflammatory on cheek  - Prednisone made her worse and made dizziness worse.  - Last quantiferon gold: 2018  - Hep C ab neg 11/3/20  - HIV neg 11/3/20  - Hep B: surface ab +, surface antigen neg c/w immunity 8/12/18  - CBC last wnl 11/3/20  - BMP wnl 12/27/20  3. 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 Hx of Afib after childbirth   5. Wart, left heel  6. Hx of high-risk HPV  7. Seborrheic dermatitis, scalp  - ketoconazole 2% shampoo, lidex 0.05% solution     SHX- pt using condoms  ____________________________________________     Assessment & Plan:     #Hs, from complex HS clinic and was Beltran stage II in the past Is on Ndyag.  Dapsone started last visit. Refuses prednisone despite prior improvement.   -amoxicillin continue, doing well  - dapsone will be continued, no SOB, increase to 100mg and then recheck in 1 week labs.  Reviewed nerve risks.   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 Reviewed case with Dr. Franco  - same plan as above with BP wash  - tretinoin, azelaic acid and BP wash.     Procedures Performed:    None    Follow-up: in 8 weeks. Labs in 1 week with dose increase.     Staff:     Danielle Shepard MD    Department of Dermatology  [facility]: Phone: [phone], Fax:[phone]  [facility]: Phone: [phone], Fax: [phone]      ____________________________________________    CC: Follow Up (4 week follow up )    HPI:  Ms. Sophia De Leon is a(n) 37 year old female who presents today as a return patient for pyoderm facial and complicated HS.     Reports  1 lesion that drained after squeezing and did warm compresses. Has menses    Still on dapsone and amxodillin    NO SOB or chest pain    Patient is otherwise feeling well, without additional skin concerns.    Labs Reviewed:  Component      Latest Ref Rng & Units 3/22/2023   % Retic      0.5 - 2.0 % 1.6   Absolute Retic      0.025 - 0.095 10e6/uL 0.069   ALT      0 - 50 U/L 35   AST      0 - 45 U/L 22   HCG Quantitative Serum      0 - 5 IU/L <1       Physical Exam:  Vitals: There were no vitals taken for this visit.  SKIN: Teledermatology photos were reviewed; image quality and interpretability: * acceptable. Image date: yestrerday   - photo with drainage from cheek  -nodulocytic lesions with acneiform  macules  - No other lesions of concern on areas examined.     Medications:  Current Outpatient Medications   Medication     Acetaminophen (TYLENOL PO)     amoxicillin (AMOXIL) 500 MG capsule     azelaic acid (FINACIA) 15 % external gel     benzoyl peroxide 5 % external liquid     cholecalciferol 50 MCG (2000 UT) CAPS     clindamycin (CLEOCIN T) 1 % external " lotion     clotrimazole (LOTRIMIN) 1 % external cream     dapsone (ACZONE) 25 MG tablet     ferrous gluconate (FERGON) 324 (38 Fe) MG tablet     IBUPROFEN PO     ketoconazole (NIZORAL) 2 % external shampoo     Riboflavin (B-2) 50 MG TABS     tretinoin (RETIN-A) 0.025 % external cream     triamcinolone (KENALOG) 0.1 % external cream     Current Facility-Administered Medications   Medication     lidocaine 1% with EPINEPHrine 1:100,000 injection 3 mL      Past Medical/Surgical History:   Patient Active Problem List   Diagnosis     Acne vulgaris     Hidradenitis suppurativa     Migraine with aura and without status migrainosus, not intractable     No past medical history on file.    CC No referring provider defined for this encounter. on close of this encounter.

## 2023-03-27 NOTE — NURSING NOTE
Chief Complaint   Patient presents with     Follow Up     4 week follow up      Teledermatology Nurse Call Patients:     Are you in the [facility] at the time of the encounter? yes    Today's visit will be billed to you and your insurance.    A teledermatology visit is not as thorough as an in-person visit and the quality of the photograph sent may not be of the same quality as that taken by the dermatology clinic.    Depression Response    Patient completed the PHQ-9 assessment for depression and scored >9? Yes  Question 9 on the PHQ-9 was positive for suicidality? No  Does patient have current mental health provider? Yes    Is this a virtual visit? Yes   Does patient have suicidal ideation (positive question 9)? No - offer to place Mental Health Referral.  Patient declined referral/not needed    I personally notified the following: visit provider     Pt states she currently has a therapist.       Shelby Kocher

## 2023-03-27 NOTE — LETTER
3/27/2023         RE: Sophia De Leon  [address] [facility] [address]        Dear Colleague,    Thank you for referring your patient, Sophia De Leon, to the [facility]. Please see a copy of my visit note below.    [facility] Dermatology Note  Encounter Date: Mar 27, 2023  Store-and-Forward and Telephone ([phone] ). Location of teledermatologist: [facility].  Start time:  12:21pm. End time: 12:33pm.        Dermatology Problem List:  1. Nodulocystic acne  -flare of R forehead 08/25/22 in dermatomal distribution ddx zoster vs Pityrosporum folliculitis vs pyoderma faciale.  -s/p valtrex without improvement  -bacterial culture with normal jonathan  -swabbed for zoster PCR 08/25/22   2. Hidradenitis suppurativa, Beltran stage II  -Current: Stelara, benzoyl peroxide 2.5% wash, clobetasol 0.05% ointment BID PRN to groin  -Past: oral antibiotics (minocycline and rifampin), Humira (migraines), isotretinoin (dryness, headaches and dizziness),  anakinra prescribed (did not start), ustekinumab (did not start)  - ILK did help the inflammatory on cheek  - Prednisone made her worse and made dizziness worse.  - Last quantiferon gold: 2018  - Hep C ab neg 11/3/20  - HIV neg 11/3/20  - Hep B: surface ab +, surface antigen neg c/w immunity 8/12/18  - CBC last wnl 11/3/20  - BMP wnl 12/27/20  3. 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 Hx of Afib after childbirth   5. Wart, left heel  6. Hx of high-risk HPV  7. Seborrheic dermatitis, scalp  - ketoconazole 2% shampoo, lidex 0.05% solution     SHX- pt using condoms  ____________________________________________     Assessment & Plan:     #Hs, from complex HS clinic and was Beltran stage II in the past Is on Ndyag.  Dapsone started last visit. Refuses prednisone despite prior improvement.   -amoxicillin continue, doing well  - dapsone will be continued, no SOB,  "increase to 100mg and then recheck in 1 week labs.  Reviewed nerve risks.   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 Reviewed case with Dr. Franco  - same plan as above with BP wash  - tretinoin, azelaic acid and BP wash.     Procedures Performed:    None    Follow-up: in 8 weeks. Labs in 1 week with dose increase.     Staff:     Danielle Shepard MD    Department of Dermatology  [facility]: Phone: [phone], Fax:[phone]  [facility]: Phone: [phone], Fax: [phone]      ____________________________________________    CC: Follow Up (4 week follow up )    HPI:  Ms. Sophia De Leon is a(n) 37 year old female who presents today as a return patient for pyoderm facial and complicated HS.     Reports  1 lesion that drained after squeezing and did warm compresses. Has menses    Still on dapsone and amxodillin    NO SOB or chest pain    Patient is otherwise feeling well, without additional skin concerns.    Labs Reviewed:  Component      Latest Ref Rng & Units 3/22/2023   % Retic      0.5 - 2.0 % 1.6   Absolute Retic      0.025 - 0.095 10e6/uL 0.069   ALT      0 - 50 U/L 35   AST      0 - 45 U/L 22   HCG Quantitative Serum      0 - 5 IU/L <1       Physical Exam:  Vitals: There were no vitals taken for this visit.  SKIN: Teledermatology photos were reviewed; image quality and interpretability: * acceptable. Image date: yestrerday   - photo with drainage from cheek  -nodulocytic lesions with acneiform  macules  - No other lesions of concern on areas examined.     Medications:  Current Outpatient Medications "   Medication     Acetaminophen (TYLENOL PO)     amoxicillin (AMOXIL) 500 MG capsule     azelaic acid (FINACIA) 15 % external gel     benzoyl peroxide 5 % external liquid     cholecalciferol 50 MCG (2000 UT) CAPS     clindamycin (CLEOCIN T) 1 % external lotion     clotrimazole (LOTRIMIN) 1 % external cream     dapsone (ACZONE) 25 MG tablet     ferrous gluconate (FERGON) 324 (38 Fe) MG tablet     IBUPROFEN PO     ketoconazole (NIZORAL) 2 % external shampoo     Riboflavin (B-2) 50 MG TABS     tretinoin (RETIN-A) 0.025 % external cream     triamcinolone (KENALOG) 0.1 % external cream     Current Facility-Administered Medications   Medication     lidocaine 1% with EPINEPHrine 1:100,000 injection 3 mL      Past Medical/Surgical History:   Patient Active Problem List   Diagnosis     Acne vulgaris     Hidradenitis suppurativa     Migraine with aura and without status migrainosus, not intractable     No past medical history on file.    CC No referring provider defined for this encounter. on close of this encounter.      Again, thank you for allowing me to participate in the care of your patient.        Sincerely,        Danielle Shepard MD

## 2023-03-28 ENCOUNTER — TELEPHONE (OUTPATIENT)
Dept: DERMATOLOGY | Facility: CLINIC | Age: 38
End: 2023-03-28
Payer: COMMERCIAL

## 2023-03-28 NOTE — TELEPHONE ENCOUNTER
Left Voicemail (1st Attempt) for the patient to call back and schedule the following:    Appointment type: Return  Provider: Dr. Shepard  Return date: 4/24/2023  Specialty phone number: [phone]  Additional appointment(s) needed: Schedule Labs in 1 week. Orders are placed by Dr. Shepard. Please schedule at location patient prefers.  Additonal Notes: Schedule photos and phone visit in 4 weeks with Dr. Shepard, on or around 4/24/2023.     Ana hart Procedure   Dermatology, Surgery, Urology  [facility]

## 2023-03-31 ENCOUNTER — TELEPHONE (OUTPATIENT)
Dept: DERMATOLOGY | Facility: CLINIC | Age: 38
End: 2023-03-31
Payer: COMMERCIAL

## 2023-03-31 NOTE — TELEPHONE ENCOUNTER
Left Voicemail (2nd Attempt) for the patient to call back and schedule the following:    Appointment type: Return  Provider: Dr. Shepard  Return date: 4/24/2023  Specialty phone number: [phone]  Additional appointment(s) needed: Labs in 1 week. Orders are placed.  Additonal Notes: Return in about 4 weeks (around 4/24/2023) photos and phone.     Ana hart Procedure   Dermatology, Surgery, Urology  [facility]

## 2023-03-31 NOTE — TELEPHONE ENCOUNTER
Pt called and scheduled for telephone visit with Dr Shepard.    Angelia Abbott RN on 3/31/2023 at 3:45 PM

## 2023-03-31 NOTE — TELEPHONE ENCOUNTER
[facility] Call Center    Phone Message    May a detailed message be left on voicemail: yes     Reason for Call: Appointment Intake    Referring Provider Name: Dr. Shepard  Diagnosis and/or Symptoms: Pt. Needs follow-up in April with Dr. Shepard per Dr. Shepard according to pt.Can schedule and put information in Mychart if unable to get a hold of pt per pt. Thanks you!    Action Taken: Other: Derm    Travel Screening: Not Applicable

## 2023-04-11 ENCOUNTER — NURSE TRIAGE (OUTPATIENT)
Dept: NURSING | Facility: CLINIC | Age: 38
End: 2023-04-11

## 2023-04-11 ENCOUNTER — LAB (OUTPATIENT)
Dept: LAB | Facility: CLINIC | Age: 38
End: 2023-04-11
Payer: COMMERCIAL

## 2023-04-11 DIAGNOSIS — L73.2 HIDRADENITIS SUPPURATIVA: ICD-10-CM

## 2023-04-11 DIAGNOSIS — Z51.81 MEDICATION MONITORING ENCOUNTER: ICD-10-CM

## 2023-04-11 DIAGNOSIS — L70.0 ACNE VULGARIS: ICD-10-CM

## 2023-04-11 LAB
BASOPHILS # BLD AUTO: 0 10E3/UL (ref 0–0.2)
BASOPHILS NFR BLD AUTO: 0 %
EOSINOPHIL # BLD AUTO: 0.2 10E3/UL (ref 0–0.7)
EOSINOPHIL NFR BLD AUTO: 4 %
ERYTHROCYTE [DISTWIDTH] IN BLOOD BY AUTOMATED COUNT: 16.7 % (ref 10–15)
HCT VFR BLD AUTO: 32.5 % (ref 35–47)
HGB BLD-MCNC: 9.8 G/DL (ref 11.7–15.7)
LYMPHOCYTES # BLD AUTO: 1.3 10E3/UL (ref 0.8–5.3)
LYMPHOCYTES NFR BLD AUTO: 30 %
MCH RBC QN AUTO: 25.9 PG (ref 26.5–33)
MCHC RBC AUTO-ENTMCNC: 30.2 G/DL (ref 31.5–36.5)
MCV RBC AUTO: 86 FL (ref 78–100)
MONOCYTES # BLD AUTO: 0.5 10E3/UL (ref 0–1.3)
MONOCYTES NFR BLD AUTO: 11 %
NEUTROPHILS # BLD AUTO: 2.4 10E3/UL (ref 1.6–8.3)
NEUTROPHILS NFR BLD AUTO: 55 %
PLATELET # BLD AUTO: 210 10E3/UL (ref 150–450)
RBC # BLD AUTO: 3.79 10E6/UL (ref 3.8–5.2)
RETICS # AUTO: 0.15 10E6/UL (ref 0.03–0.1)
RETICS/RBC NFR AUTO: 3.9 % (ref 0.5–2)
WBC # BLD AUTO: 4.4 10E3/UL (ref 4–11)

## 2023-04-11 PROCEDURE — 36415 COLL VENOUS BLD VENIPUNCTURE: CPT

## 2023-04-11 PROCEDURE — 84702 CHORIONIC GONADOTROPIN TEST: CPT

## 2023-04-11 PROCEDURE — 84450 TRANSFERASE (AST) (SGOT): CPT

## 2023-04-11 PROCEDURE — 82955 ASSAY OF G6PD ENZYME: CPT | Mod: 90

## 2023-04-11 PROCEDURE — 99000 SPECIMEN HANDLING OFFICE-LAB: CPT

## 2023-04-11 PROCEDURE — 85045 AUTOMATED RETICULOCYTE COUNT: CPT

## 2023-04-11 PROCEDURE — 84460 ALANINE AMINO (ALT) (SGPT): CPT

## 2023-04-11 PROCEDURE — 85025 COMPLETE CBC W/AUTO DIFF WBC: CPT

## 2023-04-11 PROCEDURE — 84630 ASSAY OF ZINC: CPT | Mod: 90

## 2023-04-12 ENCOUNTER — MYC MEDICAL ADVICE (OUTPATIENT)
Dept: DERMATOLOGY | Facility: CLINIC | Age: 38
End: 2023-04-12
Payer: COMMERCIAL

## 2023-04-12 LAB
ALT SERPL W P-5'-P-CCNC: 28 U/L (ref 0–50)
AST SERPL W P-5'-P-CCNC: 19 U/L (ref 0–45)
B-HCG SERPL-ACNC: <1 IU/L (ref 0–5)

## 2023-04-12 NOTE — TELEPHONE ENCOUNTER
Triage Call:    Caller: Patient    She is having chest pain, having for 4 days with the cough.  The pain is present when not coughing.    Kids were positive for strep.        Initially calling about lab results from meds, as was advised to go to the hospital if having side effects from new meds, but isn't sure if that is with it or not.     Protocol Recommended Disposition: ED    Caller verbalized understanding of instructions and questions answered.      Marian Diego RN on 4/11/2023 at 9:22 PM    Reason for Disposition    Chest pain  (Exception: MILD central chest pain, present only when coughing)    Additional Information    Negative: SEVERE difficulty breathing (e.g., struggling for each breath, speaks in single words)    Negative: Bluish (or gray) lips or face now    Negative: [1] Difficulty breathing AND [2] exposure to flames, smoke, or fumes    Negative: [1] Stridor AND [2] difficulty breathing    Negative: Sounds like a life-threatening emergency to the triager    Negative: [1] Previous asthma attacks AND [2] this feels like asthma attack    Negative: Dry cough (non-productive;  no sputum or minimal clear sputum)    Negative: [1] MODERATE difficulty breathing (e.g., speaks in phrases, SOB even at rest, pulse 100-120) AND [2] still present when not coughing    Protocols used: COUGH - ACUTE [id number]

## 2023-04-13 LAB
G6PD RBC-CCNT: 15.2 U/G HB
ZINC SERPL-MCNC: 75.6 UG/DL

## 2023-04-13 NOTE — TELEPHONE ENCOUNTER
Called patient and discussed on the phone. Says that her symptoms have now gone away. Denies any chest pain. Says her dizziness currently is no worse that her chronic baseline dizziness. Says she is taking the dapsone 50 mg daily now. Never went up to 100 mg daily due to the symptoms she was having. Says she saw her low hemoglobin result. Says she plans to restart her iron supplement that has been prescribed to her.     Told patient that she should take dapsone 50 mg daily and alert us if any dizziness worse than baseline or chest pain. Can continue dapsone 50 mg daily until scheduled follow-up on 5/1/23 and can recheck Hb at that visit.     CC Dr. Shepard as OBINNA.

## 2023-04-18 ENCOUNTER — TELEPHONE (OUTPATIENT)
Dept: DERMATOLOGY | Facility: CLINIC | Age: 38
End: 2023-04-18
Payer: COMMERCIAL

## 2023-04-18 NOTE — TELEPHONE ENCOUNTER
Result Note        Component Ref Range & Units 7 d ago 3 wk ago   % Reticulocyte 0.5 - 2.0 % 3.9 High   1.6    Absolute Reticulocyte 0.025 - 0.095 10e6/uL 0.150 High   0.069    Resulting Agency  [facility]              Specimen Collected: 04/11/23 11:14 AM Last Resulted: 04/11/23  7:10 PM         Lab Flowsheet      Order Details      View Encounter      Lab and Collection Details      Routing      Result History     View All Conversations on this Encounter           Result Care Coordination      Result Notes     Marcia Mcleod CMA   4/18/2023  1:04 PM CDT Back to Top      Notified patient that Dr. Shepard recommended she start taking Vitamin E 800 international units per day and to recheck CBC this week.     Will flag for clinic RN to place future CBC orders.

## 2023-05-01 ENCOUNTER — VIRTUAL VISIT (OUTPATIENT)
Dept: DERMATOLOGY | Facility: CLINIC | Age: 38
End: 2023-05-01
Payer: COMMERCIAL

## 2023-05-01 ENCOUNTER — TELEPHONE (OUTPATIENT)
Dept: DERMATOLOGY | Facility: CLINIC | Age: 38
End: 2023-05-01

## 2023-05-01 DIAGNOSIS — Z51.81 MEDICATION MONITORING ENCOUNTER: ICD-10-CM

## 2023-05-01 DIAGNOSIS — L70.0 NODULOCYSTIC ACNE: Primary | ICD-10-CM

## 2023-05-01 PROCEDURE — 99443 PR PHYSICIAN TELEPHONE EVALUATION 21-30 MIN: CPT | Mod: 93 | Performed by: DERMATOLOGY

## 2023-05-01 NOTE — LETTER
5/1/2023         RE: Sophia De Leon  [address] [facility] [address]        Dear Colleague,    Thank you for referring your patient, Sophia De Leon, to the [facility]. Please see a copy of my visit note below.    [facility] Dermatology Note  Encounter Date: May 1, 2023  Store-and-Forward and Telephone ([phone]). Location of teledermatologist: [facility].  Start time: 11:06am. End time: 11:28pm.    Dermatology Problem List:  1. Nodulocystic acne  -flare of R forehead 08/25/22 in dermatomal distribution ddx zoster vs Pityrosporum folliculitis vs pyoderma faciale.  -s/p valtrex without improvement  -bacterial culture with normal jonathan  -swabbed for zoster PCR 08/25/22   2. Hidradenitis suppurativa, Beltran stage II  -Current: Stelara, benzoyl peroxide 2.5% wash, clobetasol 0.05% ointment BID PRN to groin  -Past: oral antibiotics (minocycline and rifampin), Humira (migraines), isotretinoin (dryness, headaches and dizziness),  anakinra prescribed (did not start), ustekinumab (did not start)  - ILK did help the inflammatory on cheek  - Prednisone made her worse and made dizziness worse.  - Last quantiferon gold: 2018  - Hep C ab neg 11/3/20  - HIV neg 11/3/20  - Hep B: surface ab +, surface antigen neg c/w immunity 8/12/18  - CBC last wnl 11/3/20  - BMP wnl 12/27/20  3. 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 Hx of Afib after childbirth   5. Wart, left heel  6. Hx of high-risk HPV  7. Seborrheic dermatitis, scalp  - ketoconazole 2% shampoo, lidex 0.05% solution     SHX- pt using condoms  ____________________________________________     Assessment & Plan:     #HS, from complex HS clinic and was Beltran stage II in the past Is on Ndyag.  Dapsone started last visit. Refuses prednisone despite prior improvement.   -amoxicillin is okay to continue.   - dapsone  lower dosing at 50mg daily, is okay,  "reviewed hemoglobin, peripheral smear and PCP record. . We will not increase the dose. retics are up which is good. She also has increased bleeding with menses. Reviewed the risk this is contributing but no hemolysis seen on the smear. She has other medical issues which fit well for the cause and has severe HS disease and facial scarring  - Vitamin E 800 international unit(s) daily should be continued  - wants triamcinolone from Dr. Franco is a spot treatment.   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   - tretinoin when not irritated  -azelaic when not irritated  -BP when not irritated  -sent a new script for clascoterone. I reviewed with her.     #Finger tingling, started BEFORE dapsone. Has been on dapsone long. Dose is very low, not sure if hte same  -referral neurology again, she will see them    #see PCP for iron def    Procedures Performed:    None    Follow-up: recheck labs in 2 weeks and doctor visit 4 week(s) in-person, or earlier for new or changing lesions    Staff:     Danielle Shepard MD    Department of Dermatology  [facility]: Phone: [phone], Fax:[phone]  [facility]: Phone: [phone], Fax: [phone]      ____________________________________________    CC: Derm Problem (HS follow-up )    HPI:  Ms. Sophia De Leon is a(n) 37 year old female who presents today as a return patient for evaluation.     She has had a cold. She is couhging    She doesn't use creams regularly.     Pt feels well today    Uses condoms.       Not using topical regular.     Had GI pain during period and wondered about.     Patient is otherwise feeling well, without additional skin " concerns.    Labs Reviewed:  Cbc with diff reviewed    Physical Exam:  Vitals: There were no vitals taken for this visit.  SKIN: Teledermatology photos were reviewed; image quality and interpretability:  acceptable. Image date within last 3 days  - acneiform lesions and nodules on the face  - No other lesions of concern on areas examined.     Medications:  Current Outpatient Medications   Medication     Acetaminophen (TYLENOL PO)     amoxicillin (AMOXIL) 500 MG capsule     azelaic acid (FINACIA) 15 % external gel     benzoyl peroxide 5 % external liquid     cholecalciferol 50 MCG (2000 UT) CAPS     clindamycin (CLEOCIN T) 1 % external lotion     clotrimazole (LOTRIMIN) 1 % external cream     dapsone (ACZONE) 25 MG tablet     ferrous gluconate (FERGON) 324 (38 Fe) MG tablet     IBUPROFEN PO     ketoconazole (NIZORAL) 2 % external shampoo     Riboflavin (B-2) 50 MG TABS     tretinoin (RETIN-A) 0.025 % external cream     triamcinolone (KENALOG) 0.1 % external cream     Current Facility-Administered Medications   Medication     lidocaine 1% with EPINEPHrine 1:100,000 injection 3 mL      Past Medical/Surgical History:   Patient Active Problem List   Diagnosis     Acne vulgaris     Hidradenitis suppurativa     Migraine with aura and without status migrainosus, not intractable     No past medical history on file.    CC No referring provider defined for this encounter. on close of this encounter.    Again, thank you for allowing me to participate in the care of your patient.        Sincerely,        Danielle Shepard MD

## 2023-05-01 NOTE — PROGRESS NOTES
Can wait to do 24 hr urine for now if she wants to wait   [facility] Dermatology Note  Encounter Date: May 1, 2023  Store-and-Forward and Telephone ([phone]). Location of teledermatologist: [facility].  Start time: 11:06am. End time: 11:28pm.    Dermatology Problem List:  1. Nodulocystic acne  -flare of R forehead 08/25/22 in dermatomal distribution ddx zoster vs Pityrosporum folliculitis vs pyoderma faciale.  -s/p valtrex without improvement  -bacterial culture with normal jonathan  -swabbed for zoster PCR 08/25/22   2. Hidradenitis suppurativa, Beltran stage II  -Current: Stelara, benzoyl peroxide 2.5% wash, clobetasol 0.05% ointment BID PRN to groin  -Past: oral antibiotics (minocycline and rifampin), Humira (migraines), isotretinoin (dryness, headaches and dizziness),  anakinra prescribed (did not start), ustekinumab (did not start)  - ILK did help the inflammatory on cheek  - Prednisone made her worse and made dizziness worse.  - Last quantiferon gold: 2018  - Hep C ab neg 11/3/20  - HIV neg 11/3/20  - Hep B: surface ab +, surface antigen neg c/w immunity 8/12/18  - CBC last wnl 11/3/20  - BMP wnl 12/27/20  3. 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 Hx of Afib after childbirth   5. Wart, left heel  6. Hx of high-risk HPV  7. Seborrheic dermatitis, scalp  - ketoconazole 2% shampoo, lidex 0.05% solution     SHX- pt using condoms  ____________________________________________     Assessment & Plan:     #HS, from complex HS clinic and was Beltran stage II in the past Is on Ndyag.  Dapsone started last visit. Refuses prednisone despite prior improvement.   -amoxicillin is okay to continue.   - dapsone  lower dosing at 50mg daily, is okay, reviewed hemoglobin, peripheral smear and PCP record. . We will not increase the dose. retics are up which is good. She also has increased bleeding with menses. Reviewed the risk this is contributing but no hemolysis seen on the smear. She has  "other medical issues which fit well for the cause and has severe HS disease and facial scarring  - Vitamin E 800 international unit(s) daily should be continued  - wants triamcinolone from Dr. Franco is a spot treatment.   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   - tretinoin when not irritated  -azelaic when not irritated  -BP when not irritated  -sent a new script for clascoterone. I reviewed with her.     #Finger tingling, started BEFORE dapsone. Has been on dapsone long. Dose is very low, not sure if hte same  -referral neurology again, she will see them    #see PCP for iron def    Procedures Performed:    None    Follow-up: recheck labs in 2 weeks and doctor visit 4 week(s) in-person, or earlier for new or changing lesions    Staff:     Danielle Shepard MD    Department of Dermatology  [facility]: Phone: [phone], Fax:[phone]  [facility]: Phone: [phone], Fax: [phone]      ____________________________________________    CC: Derm Problem (HS follow-up )    HPI:  Ms. Sophia De Leon is a(n) 37 year old female who presents today as a return patient for evaluation.     She has had a cold. She is couhging    She doesn't use creams regularly.     Pt feels well today    Uses condoms.       Not using topical regular.     Had GI pain during period and wondered about.     Patient is otherwise feeling well, without additional skin concerns.    Labs Reviewed:  Cbc with diff reviewed    Physical Exam:  Vitals: There were no vitals taken for this visit.  SKIN: Teledermatology photos were reviewed; image quality and interpretability:  acceptable. Image date within last 3 days  - " acneiform lesions and nodules on the face  - No other lesions of concern on areas examined.     Medications:  Current Outpatient Medications   Medication     Acetaminophen (TYLENOL PO)     amoxicillin (AMOXIL) 500 MG capsule     azelaic acid (FINACIA) 15 % external gel     benzoyl peroxide 5 % external liquid     cholecalciferol 50 MCG (2000 UT) CAPS     clindamycin (CLEOCIN T) 1 % external lotion     clotrimazole (LOTRIMIN) 1 % external cream     dapsone (ACZONE) 25 MG tablet     ferrous gluconate (FERGON) 324 (38 Fe) MG tablet     IBUPROFEN PO     ketoconazole (NIZORAL) 2 % external shampoo     Riboflavin (B-2) 50 MG TABS     tretinoin (RETIN-A) 0.025 % external cream     triamcinolone (KENALOG) 0.1 % external cream     Current Facility-Administered Medications   Medication     lidocaine 1% with EPINEPHrine 1:100,000 injection 3 mL      Past Medical/Surgical History:   Patient Active Problem List   Diagnosis     Acne vulgaris     Hidradenitis suppurativa     Migraine with aura and without status migrainosus, not intractable     No past medical history on file.    CC No referring provider defined for this encounter. on close of this encounter.

## 2023-05-01 NOTE — PATIENT INSTRUCTIONS
[facility] Dermatology Visit    Thank you for allowing us to participate in your care. Your findings, instructions and follow-up plan are as follows:         When should I call my doctor?  If you are worsening or not improving, please, contact us or seek urgent care as noted below.     Who should I call with questions (adults)?  [facility] (adult and pediatric): [phone]  [facility] (adult): [phone]  For urgent needs outside of business hours call the [facility] at [phone] and ask for the dermatology resident on call  If this is a medical emergency and you are unable to reach an ER, Call 911    Who should I call with questions (pediatric)?  [facility]- Pediatric Dermatology  Dr. Diana Huston, Dr. Mahendra Dela Cruz, Dr. Omayra Lerma, Maxine Carter, PA  Dr. Angelique Almonte, Dr. Brenda Kovacs & Dr. Henri Underwood  Non Urgent  Nurse Triage Line; [phone]- Angelica and Jannie RN Care Coordinators   [facility] (/Complex ) [phone]    If you need a prescription refill, please contact your pharmacy. Refills are approved or denied by our physicians during normal business hours, Monday through Fridays  Per office policy, refills will not be granted if you have not been seen within the past year (or sooner depending on your child's condition).    Scheduling Information:  Pediatric Appointment Scheduling and Call Center [phone]  Radiology Scheduling- [phone]  Sedation Unit Scheduling- [phone]  [facility] Scheduling- General [phone]; Pediatric Dermatology [phone]  Main  Services: [phone]  Syrian: [phone]  Bulgarian: [phone]  Hmong/Darren/Cayman Islander: [phone]  Preadmission Nursing Department Fax Number: [phone] (fax all pre-operative paperwork to this number)    For urgent matters arising during evenings, weekends, or  holidays that cannot wait for normal business hours please call [phone] and ask for the dermatology resident on call to be paged.

## 2023-05-01 NOTE — NURSING NOTE
Is the patient currently in the state of [place]? YES    Visit mode:TELEPHONE    If the visit is dropped, the patient can be reconnected by: TELEPHONE VISIT: Phone number: [phone]    Will anyone else be joining the visit? NO      How would you like to obtain your AVS? MyChart    Are changes needed to the allergy or medication list? YES: pt stopped taking Dapsone     Reason for visit: Derm Problem (HS follow-up )

## 2023-05-06 ENCOUNTER — HEALTH MAINTENANCE LETTER (OUTPATIENT)
Age: 38
End: 2023-05-06

## 2023-05-15 ENCOUNTER — LAB (OUTPATIENT)
Dept: LAB | Facility: CLINIC | Age: 38
End: 2023-05-15
Payer: COMMERCIAL

## 2023-05-15 DIAGNOSIS — Z51.81 MEDICATION MONITORING ENCOUNTER: ICD-10-CM

## 2023-05-15 LAB
ALBUMIN SERPL-MCNC: 3.9 G/DL (ref 3.4–5)
ALP SERPL-CCNC: 72 U/L (ref 40–150)
ALT SERPL W P-5'-P-CCNC: 42 U/L (ref 0–50)
AST SERPL W P-5'-P-CCNC: 35 U/L (ref 0–45)
BASOPHILS # BLD AUTO: 0 10E3/UL (ref 0–0.2)
BASOPHILS NFR BLD AUTO: 0 %
BILIRUB DIRECT SERPL-MCNC: <0.1 MG/DL (ref 0–0.2)
BILIRUB SERPL-MCNC: 0.3 MG/DL (ref 0.2–1.3)
EOSINOPHIL # BLD AUTO: 0.1 10E3/UL (ref 0–0.7)
EOSINOPHIL NFR BLD AUTO: 1 %
ERYTHROCYTE [DISTWIDTH] IN BLOOD BY AUTOMATED COUNT: 12.3 % (ref 10–15)
HCT VFR BLD AUTO: 37.3 % (ref 35–47)
HGB BLD-MCNC: 11.5 G/DL (ref 11.7–15.7)
IMM GRANULOCYTES # BLD: 0 10E3/UL
IMM GRANULOCYTES NFR BLD: 0 %
LYMPHOCYTES # BLD AUTO: 1 10E3/UL (ref 0.8–5.3)
LYMPHOCYTES NFR BLD AUTO: 21 %
MCH RBC QN AUTO: 27.4 PG (ref 26.5–33)
MCHC RBC AUTO-ENTMCNC: 30.8 G/DL (ref 31.5–36.5)
MCV RBC AUTO: 89 FL (ref 78–100)
MONOCYTES # BLD AUTO: 0.4 10E3/UL (ref 0–1.3)
MONOCYTES NFR BLD AUTO: 9 %
NEUTROPHILS # BLD AUTO: 3.2 10E3/UL (ref 1.6–8.3)
NEUTROPHILS NFR BLD AUTO: 68 %
PLATELET # BLD AUTO: 259 10E3/UL (ref 150–450)
PROT SERPL-MCNC: 7.4 G/DL (ref 6.8–8.8)
RBC # BLD AUTO: 4.2 10E6/UL (ref 3.8–5.2)
RETICS # AUTO: 0.05 10E6/UL (ref 0.03–0.1)
RETICS/RBC NFR AUTO: 1.2 % (ref 0.5–2)
WBC # BLD AUTO: 4.8 10E3/UL (ref 4–11)

## 2023-05-15 PROCEDURE — 85045 AUTOMATED RETICULOCYTE COUNT: CPT

## 2023-05-15 PROCEDURE — 36415 COLL VENOUS BLD VENIPUNCTURE: CPT

## 2023-05-15 PROCEDURE — 80076 HEPATIC FUNCTION PANEL: CPT

## 2023-05-15 PROCEDURE — 85025 COMPLETE CBC W/AUTO DIFF WBC: CPT

## 2023-06-05 ENCOUNTER — VIRTUAL VISIT (OUTPATIENT)
Dept: DERMATOLOGY | Facility: CLINIC | Age: 38
End: 2023-06-05
Payer: COMMERCIAL

## 2023-06-05 DIAGNOSIS — L08.0 PYODERMA FACIALE: ICD-10-CM

## 2023-06-05 DIAGNOSIS — L73.2 HIDRADENITIS SUPPURATIVA: ICD-10-CM

## 2023-06-05 DIAGNOSIS — B00.1 HERPES LABIALIS: ICD-10-CM

## 2023-06-05 DIAGNOSIS — L70.0 ACNE VULGARIS: ICD-10-CM

## 2023-06-05 PROCEDURE — 99442 PR PHYSICIAN TELEPHONE EVALUATION 11-20 MIN: CPT | Mod: 95 | Performed by: DERMATOLOGY

## 2023-06-05 RX ORDER — DAPSONE 25 MG/1
TABLET ORAL
Qty: 60 TABLET | Refills: 2 | Status: SHIPPED | OUTPATIENT
Start: 2023-06-05 | End: 2023-11-27

## 2023-06-05 RX ORDER — CLOBETASOL PROPIONATE 0.5 MG/G
OINTMENT TOPICAL
Qty: 60 G | Refills: 1 | Status: SHIPPED | OUTPATIENT
Start: 2023-06-05 | End: 2023-11-30

## 2023-06-05 RX ORDER — CLINDAMYCIN PHOSPHATE 10 UG/ML
LOTION TOPICAL 2 TIMES DAILY
Qty: 60 ML | Refills: 3 | Status: SHIPPED | OUTPATIENT
Start: 2023-06-05 | End: 2024-06-03

## 2023-06-05 RX ORDER — AZELAIC ACID 0.15 G/G
GEL TOPICAL
Qty: 50 G | Refills: 3 | Status: SHIPPED | OUTPATIENT
Start: 2023-06-05 | End: 2024-05-20

## 2023-06-05 RX ORDER — AMOXICILLIN 500 MG/1
500 CAPSULE ORAL DAILY
Qty: 60 CAPSULE | Refills: 1 | Status: SHIPPED | OUTPATIENT
Start: 2023-06-05 | End: 2023-09-15

## 2023-06-05 RX ORDER — TRETINOIN 0.25 MG/G
CREAM TOPICAL
Qty: 45 G | Refills: 3 | Status: SHIPPED | OUTPATIENT
Start: 2023-06-05 | End: 2024-05-20

## 2023-06-05 NOTE — PROGRESS NOTES
"[facility] Dermatology Note  Encounter Date: Jun 5, 2023  Store-and-Forward and Telephone ([phone] ). Location of teledermatologist: [facility].  Start time: 11:45am End time: 11:57am    Assessment & Plan:     #SMILEY, francisco Beltran stage II in the past Is on Ndyag. Dapsone is not fully controlling, she want clobetsol, pending Ndyag. Did well with prednisone in the past  -amoxicillin is okay to continue.   - dapsone  lower dosing at 50mg daily, is okay, reviewed hemoglobin, peripheral smear and PCP record. . We will not increase the dose. retics are up which is good. She also has increased bleeding with menses. Reviewed the risk this is contributing but no hemolysis seen on the smear. She has other medical issues which fit well for the cause and has severe HS disease and facial scarring  - Vitamin E 800 international unit(s) daily should be continued  - wants triamcinolone from Dr. Franco is a spot treatment.   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 She is much much better when compared to prior photos. She using meds consitently - tretinoin when not irritated   -azelaic when not irritated   -BP when able due to irritation  -clindamycin daily   -amoxicillin daily  -next dapsone labs due August or Sept        #Finger tingling, started BEFORE dapsone. Has been on dapsone long. Dose is very low, not sure if hte same  -referral neurology pending     #see PCP for iron def    Procedures Performed:    None    Follow-up: 2months photos and phone and labs will be ordered at that time    Staff:     Danielle Shepard MD    Department of Dermatology  [facility]: Phone: [phone], " Fax:[phone]  [facility]: Phone: [phone], Fax: [phone]      ____________________________________________    CC: RECHECK (HS 1 month follow up)    HPI:  Ms. Sophia De Leon is a(n) 37 year old female who presents today as a return patient for acne and HS    Face getting better and but slow on right side    HS flared up on left thigh    Dizziness she is hydrating and eaten. Had this prior. She is monitoring this.     Once day she had trouble focusing her eye but this resolved.     Is pending laser    Patient is otherwise feeling well, without additional skin concerns.    Labs Reviewed:  NA  Physical Exam:  Vitals: There were no vitals taken for this visit.  SKIN: Teledermatology photos were reviewed; image quality and interpretability: acceptable. Image date:  today  - fewer acneiform macules and pustules on the face  subcutaneous nodule with scarring on groin  - No other lesions of concern on areas examined.     Medications:  Current Outpatient Medications   Medication     Acetaminophen (TYLENOL PO)     amoxicillin (AMOXIL) 500 MG capsule     azelaic acid (FINACIA) 15 % external gel     benzoyl peroxide 5 % external liquid     cholecalciferol 50 MCG (2000 UT) CAPS     clindamycin (CLEOCIN T) 1 % external lotion     dapsone (ACZONE) 25 MG tablet     ferrous gluconate (FERGON) 324 (38 Fe) MG tablet     IBUPROFEN PO     ketoconazole (NIZORAL) 2 % external shampoo     Riboflavin (B-2) 50 MG TABS     tretinoin (RETIN-A) 0.025 % external cream     triamcinolone (KENALOG) 0.1 % external cream     clotrimazole (LOTRIMIN) 1 % external cream     Current Facility-Administered Medications   Medication     lidocaine 1% with EPINEPHrine 1:100,000 injection 3 mL      Past Medical/Surgical History:   Patient Active Problem List   Diagnosis     Acne vulgaris     Hidradenitis suppurativa     Migraine with aura and without status migrainosus, not intractable     No past  medical history on file.    CC No referring provider defined for this encounter. on close of this encounter.

## 2023-06-05 NOTE — NURSING NOTE
Teledermatology Nurse Call Patients:     Are you in the [facility] at the time of the encounter? yes    Today's visit will be billed to you and your insurance.    A teledermatology visit is not as thorough as an in-person visit and the quality of the photograph sent may not be of the same quality as that taken by the dermatology clinic.    Chief Complaint   Patient presents with     RECHECK     HS 1 month follow up     Pt uses clobetasol ointment- however, pt ran out so she needs that filled.  Pt states the triamcinolone was not working.     Pt states she is also taking Vit E, and cod liver supplements, and Zyrtec for seasonal allergies.    Pt states she seems like she is getting a yeast infection and currently has a flare up in the left groin area- there is swelling and drainage.  On the pts right cheek there is a flare up and drainage and a bump and pt states it is all painful.     Pt states after her PHQ2 that she currently has a therapist that she sees. Did not continue with the PHQ9 based on her statement of having a provider already.    Francisca Sultana on 6/5/2023 at 10:27 AM

## 2023-06-05 NOTE — PATIENT INSTRUCTIONS
[facility] Dermatology Visit    Thank you for allowing us to participate in your care. Your findings, instructions and follow-up plan are as follows:         When should I call my doctor?  If you are worsening or not improving, please, contact us or seek urgent care as noted below.     Who should I call with questions (adults)?  [facility] (adult and pediatric): [phone]  [facility] (adult): [phone]  For urgent needs outside of business hours call the [facility] at [phone] and ask for the dermatology resident on call  If this is a medical emergency and you are unable to reach an ER, Call 911    Who should I call with questions (pediatric)?  [facility]- Pediatric Dermatology  Dr. Diana Huston, Dr. Mahendra Dela Cruz, Dr. Omayra Lerma, Maxine Carter, PA  Dr. Angelique Almonte, Dr. Brenda Kovacs & Dr. Henri Underwood  Non Urgent  Nurse Triage Line; [phone]- Angelica and Jannie RN Care Coordinators   [facility] (/Complex ) [phone]    If you need a prescription refill, please contact your pharmacy. Refills are approved or denied by our physicians during normal business hours, Monday through Fridays  Per office policy, refills will not be granted if you have not been seen within the past year (or sooner depending on your child's condition).    Scheduling Information:  Pediatric Appointment Scheduling and Call Center [phone]  Radiology Scheduling- [phone]  Sedation Unit Scheduling- [phone]  [facility] Scheduling- General [phone]; Pediatric Dermatology [phone]  Main  Services: [phone]  Honduran: [phone]  Latvian: [phone]  Hmong/Darren/Egyptian: [phone]  Preadmission Nursing Department Fax Number: [phone] (fax all pre-operative paperwork to this number)    For urgent matters arising during evenings, weekends, or  holidays that cannot wait for normal business hours please call [phone] and ask for the dermatology resident on call to be paged.

## 2023-06-05 NOTE — LETTER
"    6/5/2023         RE: Sophia De Leon  [address] [address]        Dear Colleague,    Thank you for referring your patient, Sophia De Leon, to the [facility]. Please see a copy of my visit note below.    [facility] Dermatology Note  Encounter Date: Jun 5, 2023  Store-and-Forward and Telephone ([phone] ). Location of teledermatologist: [facility].  Start time: 11:45am End time: 11:57am    Assessment & Plan:     #HS, is Beltran stage II in the past Is on Ndyag. Dapsone is not fully controlling, she want clobetsol, pending Ndyag. Did well with prednisone in the past  -amoxicillin is okay to continue.   - dapsone  lower dosing at 50mg daily, is okay, reviewed hemoglobin, peripheral smear and PCP record. . We will not increase the dose. retics are up which is good. She also has increased bleeding with menses. Reviewed the risk this is contributing but no hemolysis seen on the smear. She has other medical issues which fit well for the cause and has severe HS disease and facial scarring  - Vitamin E 800 international unit(s) daily should be continued  - wants triamcinolone from Dr. Franco is a spot treatment.   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 She is much much better when compared to prior photos. She using meds consitently - tretinoin when not irritated   -azelaic when not irritated   -BP when able due to irritation  -clindamycin daily   -amoxicillin daily  -next dapsone labs due August or Sept        #Finger tingling, started BEFORE dapsone. Has been on dapsone long. Dose is very low, not sure if hte same  -referral neurology pending     #see PCP for iron def    Procedures Performed:    None    Follow-up: 2months photos " and phone and labs will be ordered at that time    Staff:     Danielle Shepard MD    Department of Dermatology  [facility]: Phone: [phone], Fax:[phone]  [facility]: Phone: [phone], Fax: [phone]      ____________________________________________    CC: RECHECK (HS 1 month follow up)    HPI:  Ms. Sophia De Leon is a(n) 37 year old female who presents today as a return patient for acne and HS    Face getting better and but slow on right side    HS flared up on left thigh    Dizziness she is hydrating and eaten. Had this prior. She is monitoring this.     Once day she had trouble focusing her eye but this resolved.     Is pending laser    Patient is otherwise feeling well, without additional skin concerns.    Labs Reviewed:  NA  Physical Exam:  Vitals: There were no vitals taken for this visit.  SKIN: Teledermatology photos were reviewed; image quality and interpretability: acceptable. Image date:  today  - fewer acneiform macules and pustules on the face  subcutaneous nodule with scarring on groin  - No other lesions of concern on areas examined.     Medications:  Current Outpatient Medications   Medication     Acetaminophen (TYLENOL PO)     amoxicillin (AMOXIL) 500 MG capsule     azelaic acid (FINACIA) 15 % external gel     benzoyl peroxide 5 % external liquid     cholecalciferol 50 MCG (2000 UT) CAPS     clindamycin (CLEOCIN T) 1 % external lotion     dapsone (ACZONE) 25 MG tablet     ferrous gluconate (FERGON) 324 (38 Fe) MG tablet     IBUPROFEN PO     ketoconazole (NIZORAL) 2 % external shampoo     Riboflavin (B-2) 50 MG TABS     tretinoin (RETIN-A) 0.025 % external cream     triamcinolone (KENALOG) 0.1 % external cream     clotrimazole (LOTRIMIN) 1 % external cream     Current Facility-Administered Medications   Medication     lidocaine 1% with EPINEPHrine 1:100,000  injection 3 mL      Past Medical/Surgical History:   Patient Active Problem List   Diagnosis     Acne vulgaris     Hidradenitis suppurativa     Migraine with aura and without status migrainosus, not intractable     No past medical history on file.    CC No referring provider defined for this encounter. on close of this encounter.    Again, thank you for allowing me to participate in the care of your patient.        Sincerely,        Danielle Shepard MD

## 2023-06-07 ENCOUNTER — TELEPHONE (OUTPATIENT)
Dept: DERMATOLOGY | Facility: CLINIC | Age: 38
End: 2023-06-07
Payer: COMMERCIAL

## 2023-09-14 ENCOUNTER — TELEPHONE (OUTPATIENT)
Dept: DERMATOLOGY | Facility: CLINIC | Age: 38
End: 2023-09-14
Payer: COMMERCIAL

## 2023-09-15 ENCOUNTER — VIRTUAL VISIT (OUTPATIENT)
Dept: DERMATOLOGY | Facility: CLINIC | Age: 38
End: 2023-09-15
Payer: COMMERCIAL

## 2023-09-15 DIAGNOSIS — L08.0 PYODERMA FACIALE: ICD-10-CM

## 2023-09-15 DIAGNOSIS — Z51.81 MEDICATION MONITORING ENCOUNTER: Primary | ICD-10-CM

## 2023-09-15 PROCEDURE — 99442 PR PHYSICIAN TELEPHONE EVALUATION 11-20 MIN: CPT | Mod: 95 | Performed by: DERMATOLOGY

## 2023-09-15 RX ORDER — AMOXICILLIN 500 MG/1
500 CAPSULE ORAL DAILY
Qty: 60 CAPSULE | Refills: 1 | Status: SHIPPED | OUTPATIENT
Start: 2023-09-15 | End: 2023-10-23

## 2023-09-15 ASSESSMENT — PAIN SCALES - GENERAL: PAINLEVEL: EXTREME PAIN (8)

## 2023-09-15 NOTE — NURSING NOTE
Chief Complaint   Patient presents with    RECHECK     Follow up      Teledermatology Nurse Call Patients:     Are you in the [facility] at the time of the encounter? yes    Today's visit will be billed to you and your insurance.    A teledermatology visit is not as thorough as an in-person visit and the quality of the photograph sent may not be of the same quality as that taken by the dermatology clinic.    Pt states the past few months she has been experiencing bad pain in neck/shoulder blade area, today she would rate it at about a 7 on a scale of 0-10.     Pt did not want to go through the PHQ-9. States she already has a mental health care provider.     Shelby Kocher

## 2023-09-15 NOTE — PATIENT INSTRUCTIONS
[facility] Dermatology Visit    Thank you for allowing us to participate in your care. Your findings, instructions and follow-up plan are as follows:         When should I call my doctor?  If you are worsening or not improving, please, contact us or seek urgent care as noted below.     Who should I call with questions (adults)?  [facility] (adult and pediatric): [phone]  [facility] (adult): [phone]  For urgent needs outside of business hours call the [facility] at [phone] and ask for the dermatology resident on call  If this is a medical emergency and you are unable to reach an ER, Call 911    Who should I call with questions (pediatric)?  [facility]- Pediatric Dermatology  Dr. Diana Huston, Dr. Mahendra Dela Cruz, Dr. Omayra Lerma, Maxine Carter, PA  Dr. Angelique Almonte, Dr. Brenda Kovacs & Dr. Henri Underwood  Non Urgent  Nurse Triage Line; [phone]- Angelica and Jannie RN Care Coordinators   [facility] (/Complex ) [phone]    If you need a prescription refill, please contact your pharmacy. Refills are approved or denied by our physicians during normal business hours, Monday through Fridays  Per office policy, refills will not be granted if you have not been seen within the past year (or sooner depending on your child's condition).    Scheduling Information:  Pediatric Appointment Scheduling and Call Center [phone]  Radiology Scheduling- [phone]  Sedation Unit Scheduling- [phone]  [place] Scheduling- General [phone]; Pediatric Dermatology [phone]  Main  Services: [phone]  Hong Konger: [phone]  Nigerian: [phone]  Hmong/Darren/Jordanian: [phone]  Preadmission Nursing Department Fax Number: [phone] (fax all pre-operative paperwork to this number)    For urgent matters arising during evenings, weekends, or  holidays that cannot wait for normal business hours please call [phone] and ask for the dermatology resident on call to be paged.

## 2023-09-15 NOTE — LETTER
9/15/2023         RE: Sophia De Leon  [address] [address]        Dear Colleague,    Thank you for referring your patient, Sophia De Leon, to the [facility]. Please see a copy of my visit note below.    [facility] Dermatology Note  Encounter Date: Sep 15, 2023  Store-and-Forward and Telephone ([phone]). Location of teledermatologist: [facility].  Start time: 5:36pm. End time: 5:50pm.    Dermatology Problem List:  1. Nodulocystic acne  -flare of R forehead 08/25/22 in dermatomal distribution ddx zoster vs Pityrosporum folliculitis vs pyoderma faciale.  -s/p valtrex without improvement  -bacterial culture with normal jonathan  -swabbed for zoster PCR 08/25/22   2. Hidradenitis suppurativa, Beltran stage II  -Current: Stelara, benzoyl peroxide 2.5% wash, clobetasol 0.05% ointment BID PRN to groin  -Past: oral antibiotics (minocycline and rifampin), Humira (migraines), isotretinoin (dryness, headaches and dizziness),  anakinra prescribed (did not start), ustekinumab (did not start)  - ILK did help the inflammatory on cheek  - Prednisone made her worse and made dizziness worse.  - Last quantiferon gold: 2018  - Hep C ab neg 11/3/20  - HIV neg 11/3/20  - Hep B: surface ab +, surface antigen neg c/w immunity 8/12/18  - CBC last wnl 11/3/20  - BMP wnl 12/27/20  3. 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 Hx of Afib after childbirth   5. Wart, left heel  6. Hx of high-risk HPV  7. Seborrheic dermatitis, scalp  - ketoconazole 2% shampoo, lidex 0.05% solution     SHX- pt using condoms    ____________________________________________    Assessment & Plan:    #HS, is Beltran stage II in the past Is on Ndyag. Does well with prednisone and pending Ng:Yag.   -amoxicillin is okay to restart and try an stop for GI upset   - stop dapsone  50mg daily  reviewed hemoglobin but hold until she rechecks  "labs.   - Vitamin E 800IU as per Dr. 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  tretinoin when not irritated   -azelaic acid once daily  -BP when not having irritation  -clindamycin daily  -amoxicillin daily  -did well on dapsone, holding until normal labs        #Finger tingling,   -still pending neurology       Procedures Performed:    None    Follow-up: labs and again in 3 weeks photos and phone. Recommend she check labs tonight    Staff:     Danielle Shepard MD    Department of Dermatology  [facility]: Phone: [phone], Fax:[phone]  [facility]: Phone: [phone], Fax: [phone]    ____________________________________________    CC: RECHECK (Follow up )    HPI:  Ms. Sophia De Leon is a(n) 37 year old female who presents today as a return patient for facial pyoderma faciale. Pt reports she was improving. But then had flares in groin.     Pt had amoxicillin on board but held due to GI issue.     Patient is otherwise feeling well, without additional skin concerns.    Labs Reviewed:  Last hemoglobin reviewed    Physical Exam:  Vitals: There were no vitals taken for this visit.  SKIN: Teledermatology photos were reviewed; image quality and interpretability: acceptable. Image date: .  - pustular lesions and nodules on the face  - No other lesions of concern on areas examined.     Medications:  Current Outpatient Medications   Medication     Acetaminophen (TYLENOL PO)     amoxicillin (AMOXIL) 500 MG capsule     azelaic acid (FINACIA) 15 % external gel "     benzoyl peroxide 5 % external liquid     cholecalciferol 50 MCG (2000 UT) CAPS     clindamycin (CLEOCIN T) 1 % external lotion     clobetasol (TEMOVATE) 0.05 % external ointment     clotrimazole (LOTRIMIN) 1 % external cream     dapsone (ACZONE) 25 MG tablet     ferrous gluconate (FERGON) 324 (38 Fe) MG tablet     IBUPROFEN PO     ketoconazole (NIZORAL) 2 % external shampoo     Riboflavin (B-2) 50 MG TABS     tretinoin (RETIN-A) 0.025 % external cream     Current Facility-Administered Medications   Medication     lidocaine 1% with EPINEPHrine 1:100,000 injection 3 mL      Past Medical/Surgical History:   Patient Active Problem List   Diagnosis     Acne vulgaris     Hidradenitis suppurativa     Migraine with aura and without status migrainosus, not intractable     No past medical history on file.    CC No referring provider defined for this encounter. on close of this encounter.      Again, thank you for allowing me to participate in the care of your patient.        Sincerely,        Danielle Shepard MD

## 2023-09-15 NOTE — PROGRESS NOTES
"[facility] Dermatology Note  Encounter Date: Sep 15, 2023  Store-and-Forward and Telephone ([phone]). Location of teledermatologist: [facility].  Start time: 5:36pm. End time: 5:50pm.    Dermatology Problem List:  1. Nodulocystic acne  -flare of R forehead 08/25/22 in dermatomal distribution ddx zoster vs Pityrosporum folliculitis vs pyoderma faciale.  -s/p valtrex without improvement  -bacterial culture with normal jonathan  -swabbed for zoster PCR 08/25/22   2. Hidradenitis suppurativa, Beltran stage II  -Current: Stelara, benzoyl peroxide 2.5% wash, clobetasol 0.05% ointment BID PRN to groin  -Past: oral antibiotics (minocycline and rifampin), Humira (migraines), isotretinoin (dryness, headaches and dizziness),  anakinra prescribed (did not start), ustekinumab (did not start)  - ILK did help the inflammatory on cheek  - Prednisone made her worse and made dizziness worse.  - Last quantiferon gold: 2018  - Hep C ab neg 11/3/20  - HIV neg 11/3/20  - Hep B: surface ab +, surface antigen neg c/w immunity 8/12/18  - CBC last wnl 11/3/20  - BMP wnl 12/27/20  3. 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 Hx of Afib after childbirth   5. Wart, left heel  6. Hx of high-risk HPV  7. Seborrheic dermatitis, scalp  - ketoconazole 2% shampoo, lidex 0.05% solution     SHX- pt using condoms    ____________________________________________    Assessment & Plan:    #HS, is Beltran stage II in the past Is on Ndyag. Does well with prednisone and pending Ng:Yag.   -amoxicillin is okay to restart and try an stop for GI upset   - stop dapsone  50mg daily  reviewed hemoglobin but hold until she rechecks labs.   - Vitamin E 800IU as per Dr. Franco, continue taking  - wants triamcinolone for spot treatment  - Clindamycin in HS topical  -future from last record : \"-isotretinoin, dryness, headaches, dizziness in the past but could reconsider with " "monitoring from eye clinic if eye pressures \"      # pyoderma faciale -improved on prednisone and dapsone and amxocillin but then off amoxicillin and not doing well again.  tretinoin when not irritated   -azelaic acid once daily  -BP when not having irritation  -clindamycin daily  -amoxicillin daily  -did well on dapsone, holding until normal labs        #Finger tingling,   -still pending neurology       Procedures Performed:    None    Follow-up: labs and again in 3 weeks photos and phone. Recommend she check labs tonight    Staff:     Danielle Shepard MD    Department of Dermatology  [facility]: Phone: [phone], Fax:[phone]  [facility]: Phone: [phone], Fax: [phone]    ____________________________________________    CC: RECHECK (Follow up )    HPI:  Ms. Sophia De Leon is a(n) 37 year old female who presents today as a return patient for facial pyoderma faciale. Pt reports she was improving. But then had flares in groin.     Pt had amoxicillin on board but held due to GI issue.     Patient is otherwise feeling well, without additional skin concerns.    Labs Reviewed:  Last hemoglobin reviewed    Physical Exam:  Vitals: There were no vitals taken for this visit.  SKIN: Teledermatology photos were reviewed; image quality and interpretability: acceptable. Image date: .  - pustular lesions and nodules on the face  - No other lesions of concern on areas examined.     Medications:  Current Outpatient Medications   Medication    Acetaminophen (TYLENOL PO)    amoxicillin (AMOXIL) 500 MG capsule    azelaic acid (FINACIA) 15 % external gel    benzoyl peroxide 5 % external liquid    cholecalciferol 50 MCG (2000 UT) CAPS    clindamycin (CLEOCIN T) 1 % external lotion    clobetasol (TEMOVATE) 0.05 % external ointment    clotrimazole (LOTRIMIN) 1 % external cream    dapsone (ACZONE) 25 MG " tablet    ferrous gluconate (FERGON) 324 (38 Fe) MG tablet    IBUPROFEN PO    ketoconazole (NIZORAL) 2 % external shampoo    Riboflavin (B-2) 50 MG TABS    tretinoin (RETIN-A) 0.025 % external cream     Current Facility-Administered Medications   Medication    lidocaine 1% with EPINEPHrine 1:100,000 injection 3 mL      Past Medical/Surgical History:   Patient Active Problem List   Diagnosis    Acne vulgaris    Hidradenitis suppurativa    Migraine with aura and without status migrainosus, not intractable     No past medical history on file.    CC No referring provider defined for this encounter. on close of this encounter.

## 2023-09-21 ENCOUNTER — LAB (OUTPATIENT)
Dept: LAB | Facility: CLINIC | Age: 38
End: 2023-09-21
Payer: COMMERCIAL

## 2023-09-21 DIAGNOSIS — Z51.81 MEDICATION MONITORING ENCOUNTER: ICD-10-CM

## 2023-09-21 LAB
ALBUMIN SERPL BCG-MCNC: 4.3 G/DL (ref 3.5–5.2)
ALP SERPL-CCNC: 83 U/L (ref 35–104)
ALT SERPL W P-5'-P-CCNC: 14 U/L (ref 0–50)
ANION GAP SERPL CALCULATED.3IONS-SCNC: 11 MMOL/L (ref 7–15)
AST SERPL W P-5'-P-CCNC: 22 U/L (ref 0–45)
BASOPHILS # BLD AUTO: 0 10E3/UL (ref 0–0.2)
BASOPHILS NFR BLD AUTO: 1 %
BILIRUB SERPL-MCNC: 0.4 MG/DL
BUN SERPL-MCNC: 9.1 MG/DL (ref 6–20)
CALCIUM SERPL-MCNC: 9 MG/DL (ref 8.6–10)
CHLORIDE SERPL-SCNC: 103 MMOL/L (ref 98–107)
CREAT SERPL-MCNC: 0.63 MG/DL (ref 0.51–0.95)
DEPRECATED HCO3 PLAS-SCNC: 23 MMOL/L (ref 22–29)
EGFRCR SERPLBLD CKD-EPI 2021: >90 ML/MIN/1.73M2
EOSINOPHIL # BLD AUTO: 0.1 10E3/UL (ref 0–0.7)
EOSINOPHIL NFR BLD AUTO: 2 %
ERYTHROCYTE [DISTWIDTH] IN BLOOD BY AUTOMATED COUNT: 16.2 % (ref 10–15)
GLUCOSE SERPL-MCNC: 87 MG/DL (ref 70–99)
HCT VFR BLD AUTO: 33.8 % (ref 35–47)
HGB BLD-MCNC: 10.2 G/DL (ref 11.7–15.7)
IMM GRANULOCYTES # BLD: 0 10E3/UL
IMM GRANULOCYTES NFR BLD: 0 %
LYMPHOCYTES # BLD AUTO: 0.9 10E3/UL (ref 0.8–5.3)
LYMPHOCYTES NFR BLD AUTO: 17 %
MCH RBC QN AUTO: 24.5 PG (ref 26.5–33)
MCHC RBC AUTO-ENTMCNC: 30.2 G/DL (ref 31.5–36.5)
MCV RBC AUTO: 81 FL (ref 78–100)
MONOCYTES # BLD AUTO: 0.4 10E3/UL (ref 0–1.3)
MONOCYTES NFR BLD AUTO: 8 %
NEUTROPHILS # BLD AUTO: 3.8 10E3/UL (ref 1.6–8.3)
NEUTROPHILS NFR BLD AUTO: 73 %
PLATELET # BLD AUTO: 253 10E3/UL (ref 150–450)
POTASSIUM SERPL-SCNC: 4.1 MMOL/L (ref 3.4–5.3)
PROT SERPL-MCNC: 7.3 G/DL (ref 6.4–8.3)
RBC # BLD AUTO: 4.17 10E6/UL (ref 3.8–5.2)
SODIUM SERPL-SCNC: 137 MMOL/L (ref 136–145)
WBC # BLD AUTO: 5.2 10E3/UL (ref 4–11)

## 2023-09-21 PROCEDURE — 36415 COLL VENOUS BLD VENIPUNCTURE: CPT | Performed by: DERMATOLOGY

## 2023-09-21 PROCEDURE — 80053 COMPREHEN METABOLIC PANEL: CPT | Performed by: DERMATOLOGY

## 2023-09-21 PROCEDURE — 85025 COMPLETE CBC W/AUTO DIFF WBC: CPT

## 2023-09-25 ENCOUNTER — TELEPHONE (OUTPATIENT)
Dept: DERMATOLOGY | Facility: CLINIC | Age: 38
End: 2023-09-25
Payer: COMMERCIAL

## 2023-09-25 NOTE — TELEPHONE ENCOUNTER
Pt called and notified of pathology results.    Angelia Abbott RN on 9/25/2023 at 2:50 PM      CBC with platelets and differential  Order: [id number] - Part of Panel Order [id number]  Status: Final result       Visible to patient: Yes (seen)       Dx: Medication monitoring encounter    1 Result Note          Component Ref Range & Units 4 d ago 4 mo ago 5 mo ago 6 mo ago 5 yr ago  WBC Count 4.0 - 11.0 10e3/uL 5.2 4.8 4.4 6.0 5.4 R  RBC Count 3.80 - 5.20 10e6/uL 4.17 4.20 3.79 Low  4.36 4.46 R  Hemoglobin 11.7 - 15.7 g/dL 10.2 Low  11.5 Low  9.8 Low  10.9 Low  11.5 Low   Hematocrit 35.0 - 47.0 % 33.8 Low  37.3 32.5 Low  35.0 37.0  MCV 78 - 100 fL 81 89 86 80 83 R  MCH 26.5 - 33.0 pg 24.5 Low  27.4 25.9 Low  25.0 Low  25.8 Low   MCHC 31.5 - 36.5 g/dL 30.2 Low  30.8 Low  30.2 Low  31.1 Low  31.1 Low   RDW 10.0 - 15.0 % 16.2 High  12.3 16.7 High  13.7 13.7  Platelet Count 150 - 450 10e3/uL 253 259 210 248 257 R  % Neutrophils % 73 68 55 66 67.2  % Lymphocytes % 17 21 30 23 21.4  % Monocytes % 8 9 11 8 7.7  % Eosinophils % 2 1 4 2 2.6  % Basophils % 1 0 0 1 0.9  % Immature Granulocytes % 0 0     Absolute Neutrophils 1.6 - 8.3 10e3/uL 3.8 3.2 2.4 3.9   Absolute Lymphocytes 0.8 - 5.3 10e3/uL 0.9 1.0 1.3 1.4   Absolute Monocytes 0.0 - 1.3 10e3/uL 0.4 0.4 0.5 0.5   Absolute Eosinophils 0.0 - 0.7 10e3/uL 0.1 0.1 0.2 0.1   Absolute Basophils 0.0 - 0.2 10e3/uL 0.0 0.0 0.0 0.0   Absolute Immature Granulocytes <=0.4 10e3/uL 0.0 0.0     Resulting Agency  [facility]          Specimen Collected: 09/21/23 12:37 PM Last Resulted: 09/21/23  2:28 PM          Result Notes     Danielle Shepard MD  9/22/2023  6:33 PM CDT Back to Top    Acceptable labs

## 2023-10-23 ENCOUNTER — VIRTUAL VISIT (OUTPATIENT)
Dept: DERMATOLOGY | Facility: CLINIC | Age: 38
End: 2023-10-23
Payer: COMMERCIAL

## 2023-10-23 DIAGNOSIS — Z51.81 MEDICATION MONITORING ENCOUNTER: Primary | ICD-10-CM

## 2023-10-23 DIAGNOSIS — L08.0 PYODERMA FACIALE: ICD-10-CM

## 2023-10-23 PROCEDURE — 99442 PR PHYSICIAN TELEPHONE EVALUATION 11-20 MIN: CPT | Mod: 95 | Performed by: DERMATOLOGY

## 2023-10-23 RX ORDER — MINOCYCLINE HYDROCHLORIDE 100 MG/1
100 CAPSULE ORAL 2 TIMES DAILY
Qty: 60 CAPSULE | Refills: 3 | Status: SHIPPED | OUTPATIENT
Start: 2023-10-23 | End: 2023-11-27

## 2023-10-23 ASSESSMENT — PAIN SCALES - GENERAL: PAINLEVEL: SEVERE PAIN (6)

## 2023-10-23 NOTE — PATIENT INSTRUCTIONS
[facility] Dermatology Visit    Thank you for allowing us to participate in your care. Your findings, instructions and follow-up plan are as follows:     Topical Rogaine (Minoxidil) for Pattern Hair Loss    Minoxidil is an FDA approved over the counter topical for the treatment of hair loss and thinning hair in men and women.   Initially a 2% solution was available however this required application twice daily. A 5% solution is also now approved, only requiring application once per day.     Available Products:   Rogaine 5% solution: Packaged for men however can be used by men or women. Use dropper and apply directly to scalp at bedtime. This product can cause an allergy because of presence of propylene glycol. Stop this product if you develop a rash or itching and contact your physician.   Rogaine 5% foam: Packaged for men and women: Apply foam directly to the scalp once daily. This is less greasy compared to the solution. This formula is preferred for those who had a reaction to the solution product. If you develop rash or itching, stop the product and contact your physician.     What if I stop minoxidil topical?   After stopping minoxidil the hair will return to the usual pattern of thinning. Using the product 3-4 times per week is better than not using product at all.       Can I use generic minoxidil?   Yes, look for 5% minoxidil.     What are the side effects?  The most common side effect is rash or itching of the scalp. This can occur if a contact allergy develops with propylene glycol. A small group of patients noticed the appearance of facial hair if the product runs onto the face or with prolonged use. Keep it away from the face.  This can cause temporary shedding. Please, call us if this happens.  This can irritated the scalp. Please, call us if this happens.  Stop this product if you become pregnant or are breastfeeding      Last updated: 11/2/2021     When should I call my doctor?  If  you are worsening or not improving, please, contact us or seek urgent care as noted below.     Who should I call with questions (adults)?  [facility] (adult and pediatric): [phone]  [facility] (adult): [phone]  For urgent needs outside of business hours call the [facility] at [phone] and ask for the dermatology resident on call  If this is a medical emergency and you are unable to reach an ER, Call [phone]    Who should I call with questions (pediatric)?  [facility]- Pediatric Dermatology  Dr. Diana Huston, Dr. Mahendra Dela Cruz, Dr. Omayra Lerma, Maxine Carter, SEAN Almonte, Dr. Brenda Kovacs & Dr. Henri Underwood  Non Urgent  Nurse Triage Line; [phone]- Cda and Jannie RN Care Coordinators   [facility] (/Complex ) [phone]    If you need a prescription refill, please contact your pharmacy. Refills are approved or denied by our physicians during normal business hours, Monday through Fridays  Per office policy, refills will not be granted if you have not been seen within the past year (or sooner depending on your child's condition).    Scheduling Information:  Pediatric Appointment Scheduling and Call Center [phone]  Radiology Scheduling- [phone]  Sedation Unit Scheduling- [phone]  [facility] Scheduling- General [phone]; Pediatric Dermatology [phone]  Main  Services: [phone]  Frisian: [phone]  Lao: [phone]  Hmong/Macedonian/Canadian: [phone]  Preadmission Nursing Department Fax Number: [phone] (fax all pre-operative paperwork to this number)    For urgent matters arising during evenings, weekends, or holidays that cannot wait for normal business hours please call [phone] and ask for the dermatology resident on call to be paged.

## 2023-10-23 NOTE — LETTER
10/23/2023         RE: Sophia De Leon  [address] [facility] [address]        Dear Colleague,    Thank you for referring your patient, Sophia De Leon, to the [facility]. Please see a copy of my visit note below.    [facility] Dermatology Note  Encounter Date: Oct 23, 2023  Store-and-Forward and Telephone ([phone] ). Location of teledermatologist: [facility].  Start time: 11:42am. End time: 11;54pm.    Dermatology Problem List:  1. Nodulocystic acne  -flare of R forehead 08/25/22 in dermatomal distribution ddx zoster vs Pityrosporum folliculitis vs pyoderma faciale.amoxcillin helpful with some GI upset  -s/p valtrex without improvement  -bacterial culture with normal jonathan  -swabbed for zoster PCR 08/25/22   2. Hidradenitis suppurativa, Beltran stage II  -Current: Stelara, benzoyl peroxide 2.5% wash, clobetasol 0.05% ointment BID PRN to groin  -Past: oral antibiotics (minocycline and rifampin), Humira (migraines), isotretinoin (dryness, headaches and dizziness),  anakinra prescribed (did not start), ustekinumab (did not start)  - ILK did help the inflammatory on cheek  - Prednisone made her worse and made dizziness worse.  - Last quantiferon gold: 2018  - Hep C ab neg 11/3/20  - HIV neg 11/3/20  - Hep B: surface ab +, surface antigen neg c/w immunity 8/12/18  - CBC last wnl 11/3/20  - BMP wnl 12/27/20  3. 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 Hx of Afib after childbirth   5. Wart, left heel  6. Hx of high-risk HPV  7. Seborrheic dermatitis, scalp  - ketoconazole 2% shampoo, lidex 0.05% solution     SHX- pt using condoms     ____________________________________________     Assessment & Plan:    #HS, is Ruby stage II in the past Is on Ndyag. Does well with prednisone and pending Ng:Yag.   -amoxicillin is okay to restart and try an stop for GI upset   - stop dapsone  50mg daily  reviewed  "hemoglobin but hold until she rechecks labs.   - Vitamin E 800IU as per Dr. 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  tretinoin when not irritated   -azelaic acid once daily, clindamycin daily  -clindamycin daily  -amoxicillin hold. Wants to dry Reviewed risks of minoxidil, black box warning, cardiac tamponade, leg swelling, hair growth in other areas of body including face, hair shedding, pericarditis, katie/liver issues, arrhythmia. Pt doses not have lupus or porphyria or pheochromocytoma. Reviewed lightheadedness, follow BP, fluid retention, tachycardia, headache, periorbital edema and insomnia. Patient understands risks of fetal complications and need for pregnancy protection.   Pt will check CMP prior   Will initiate accutane process and labs  -Reoffered accutane, offer oral clindamycin, offered minocycline.         #Finger tingling,   -still pending neurology      #dapsone monitoring- low hgB unlcear if related as she has had low hemoglobin for 5 years  -cbc with diff and alt/ast, on 50mg daily. If stable we can go up to 100mg.   -needs to see GI    Procedures Performed:    None    Follow-up: 4 weeks photos and phone and go to lab    Staff:     Danielle Shepard MD    Department of Dermatology  [facility]: Phone: [phone], Fax:[phone]  [facility]: Phone: [phone], Fax: [phone]    ____________________________________________    CC: RECHECK    HPI:  Ms. Sophia De Leon is a(n) 37 year old female who presents today as a return patient for " skin issues    Feels she is having some good and bad days    She is taking 50mg dapsone daily, she is intermittently taking vitamin E    Amoxicillin she is taking    She does have GI upset, worsening her baseline upset sometimes    She can tolerate amoxiciline mostly    Wants to try again minocycline    Patient is otherwise feeling well, without additional skin concerns.    Labs Reviewed:  Component      Latest Ref Rng 9/21/2023  12:37 PM   WBC      4.0 - 11.0 10e3/uL 5.2    RBC Count      3.80 - 5.20 10e6/uL 4.17    Hemoglobin      11.7 - 15.7 g/dL 10.2 (L)    Hematocrit      35.0 - 47.0 % 33.8 (L)    MCV      78 - 100 fL 81    MCH      26.5 - 33.0 pg 24.5 (L)    MCHC      31.5 - 36.5 g/dL 30.2 (L)    RDW      10.0 - 15.0 % 16.2 (H)    Platelet Count      150 - 450 10e3/uL 253    % Neutrophils      % 73    % Lymphocytes      % 17    % Monocytes      % 8    % Eosinophils      % 2    % Basophils      % 1    % Immature Granulocytes      % 0    Absolute Neutrophils      1.6 - 8.3 10e3/uL 3.8    Absolute Lymphocytes      0.8 - 5.3 10e3/uL 0.9    Absolute Monocytes      0.0 - 1.3 10e3/uL 0.4    Absolute Eosinophils      0.0 - 0.7 10e3/uL 0.1    Absolute Basophils      0.0 - 0.2 10e3/uL 0.0    Absolute Immature Granulocytes      <=0.4 10e3/uL 0.0    Sodium      136 - 145 mmol/L 137    Potassium      3.4 - 5.3 mmol/L 4.1    Chloride      98 - 107 mmol/L 103    Carbon Dioxide (CO2)      22 - 29 mmol/L 23    Anion Gap      7 - 15 mmol/L 11    Urea Nitrogen      6.0 - 20.0 mg/dL 9.1    Creatinine      0.51 - 0.95 mg/dL 0.63    Calcium      8.6 - 10.0 mg/dL 9.0    Glucose      70 - 99 mg/dL 87    Alkaline Phosphatase      35 - 104 U/L 83    AST      0 - 45 U/L 22    ALT      0 - 50 U/L 14    Protein Total      6.4 - 8.3 g/dL 7.3    Albumin      3.5 - 5.2 g/dL 4.3    Bilirubin Total      <=1.2 mg/dL 0.4    GFR Estimate      >60 mL/min/1.73m2 >90       Legend:  (L) Low  (H) High    Physical Exam:  Vitals: There were no vitals  taken for this visit.  SKIN: Teledermatology photos were reviewed; image quality and interpretability: acceptable. Image date: within 5 day.  - pustules on the forehead and jawline  - No other lesions of concern on areas examined.     Medications:  Current Outpatient Medications   Medication     Acetaminophen (TYLENOL PO)     amoxicillin (AMOXIL) 500 MG capsule     azelaic acid (FINACIA) 15 % external gel     benzoyl peroxide 5 % external liquid     cholecalciferol 50 MCG (2000 UT) CAPS     clindamycin (CLEOCIN T) 1 % external lotion     clobetasol (TEMOVATE) 0.05 % external ointment     dapsone (ACZONE) 25 MG tablet     ferrous gluconate (FERGON) 324 (38 Fe) MG tablet     IBUPROFEN PO     ketoconazole (NIZORAL) 2 % external shampoo     tretinoin (RETIN-A) 0.025 % external cream     clotrimazole (LOTRIMIN) 1 % external cream     Riboflavin (B-2) 50 MG TABS     Current Facility-Administered Medications   Medication     lidocaine 1% with EPINEPHrine 1:100,000 injection 3 mL      Past Medical/Surgical History:   Patient Active Problem List   Diagnosis     Acne vulgaris     Hidradenitis suppurativa     Migraine with aura and without status migrainosus, not intractable     No past medical history on file.    CC No referring provider defined for this encounter. on close of this encounter.      Again, thank you for allowing me to participate in the care of your patient.        Sincerely,        Danielle Shepard MD

## 2023-10-23 NOTE — PROGRESS NOTES
"[facility] Dermatology Note  Encounter Date: Oct 23, 2023  Store-and-Forward and Telephone ([phone] ). Location of teledermatologist: [facility].  Start time: 11:42am. End time: 11;54pm.    Dermatology Problem List:  1. Nodulocystic acne  -flare of R forehead 08/25/22 in dermatomal distribution ddx zoster vs Pityrosporum folliculitis vs pyoderma faciale.amoxcillin helpful with some GI upset  -s/p valtrex without improvement  -bacterial culture with normal jonathan  -swabbed for zoster PCR 08/25/22   2. Hidradenitis suppurativa, Beltran stage II  -Current: Stelara, benzoyl peroxide 2.5% wash, clobetasol 0.05% ointment BID PRN to groin  -Past: oral antibiotics (minocycline and rifampin), Humira (migraines), isotretinoin (dryness, headaches and dizziness),  anakinra prescribed (did not start), ustekinumab (did not start)  - ILK did help the inflammatory on cheek  - Prednisone made her worse and made dizziness worse.  - Last quantiferon gold: 2018  - Hep C ab neg 11/3/20  - HIV neg 11/3/20  - Hep B: surface ab +, surface antigen neg c/w immunity 8/12/18  - CBC last wnl 11/3/20  - BMP wnl 12/27/20  3. 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 Hx of Afib after childbirth   5. Wart, left heel  6. Hx of high-risk HPV  7. Seborrheic dermatitis, scalp  - ketoconazole 2% shampoo, lidex 0.05% solution     SHX- pt using condoms     ____________________________________________     Assessment & Plan:    #HS, is Ebltran stage II in the past Is on Ndyag. Does well with prednisone and pending Ng:Yag.   -amoxicillin is okay to restart and try an stop for GI upset   - stop dapsone  50mg daily  reviewed hemoglobin but hold until she rechecks labs.   - Vitamin E 800IU as per Dr. Franco, continue taking  - wants triamcinolone for spot treatment  - Clindamycin in HS topical  -future from last record : \"-isotretinoin, dryness, headaches, " "dizziness in the past but could reconsider with monitoring from eye clinic if eye pressures \"      # pyoderma faciale -improved on prednisone and dapsone and amxocillin but then off amoxicillin and not doing well again.  tretinoin when not irritated   -azelaic acid once daily, clindamycin daily  -clindamycin daily  -amoxicillin hold. Wants to dry Reviewed risks of minoxidil, black box warning, cardiac tamponade, leg swelling, hair growth in other areas of body including face, hair shedding, pericarditis, katie/liver issues, arrhythmia. Pt doses not have lupus or porphyria or pheochromocytoma. Reviewed lightheadedness, follow BP, fluid retention, tachycardia, headache, periorbital edema and insomnia. Patient understands risks of fetal complications and need for pregnancy protection.   Pt will check CMP prior   Will initiate accutane process and labs  -Reoffered accutane, offer oral clindamycin, offered minocycline.         #Finger tingling,   -still pending neurology      #dapsone monitoring- low hgB unlcear if related as she has had low hemoglobin for 5 years  -cbc with diff and alt/ast, on 50mg daily. If stable we can go up to 100mg.   -needs to see GI    Procedures Performed:    None    Follow-up: 4 weeks photos and phone and go to lab    Staff:     Danielle Shpeard MD    Department of Dermatology  [facility]: Phone: [phone], Fax:[phone]  [facility]: Phone: [phone], Fax: [phone]    ____________________________________________    CC: RECHECK    HPI:  Ms. Sophia De Leon is a(n) 37 year old female who presents today as a return patient for skin issues    Feels she is having some good and bad days    She is taking 50mg dapsone daily, she is intermittently taking vitamin E    Amoxicillin she is taking    She does have GI upset, worsening her baseline upset sometimes    She can " tolerate amoxiciline mostly    Wants to try again minocycline    Patient is otherwise feeling well, without additional skin concerns.    Labs Reviewed:  Component      Latest Ref Rng 9/21/2023  12:37 PM   WBC      4.0 - 11.0 10e3/uL 5.2    RBC Count      3.80 - 5.20 10e6/uL 4.17    Hemoglobin      11.7 - 15.7 g/dL 10.2 (L)    Hematocrit      35.0 - 47.0 % 33.8 (L)    MCV      78 - 100 fL 81    MCH      26.5 - 33.0 pg 24.5 (L)    MCHC      31.5 - 36.5 g/dL 30.2 (L)    RDW      10.0 - 15.0 % 16.2 (H)    Platelet Count      150 - 450 10e3/uL 253    % Neutrophils      % 73    % Lymphocytes      % 17    % Monocytes      % 8    % Eosinophils      % 2    % Basophils      % 1    % Immature Granulocytes      % 0    Absolute Neutrophils      1.6 - 8.3 10e3/uL 3.8    Absolute Lymphocytes      0.8 - 5.3 10e3/uL 0.9    Absolute Monocytes      0.0 - 1.3 10e3/uL 0.4    Absolute Eosinophils      0.0 - 0.7 10e3/uL 0.1    Absolute Basophils      0.0 - 0.2 10e3/uL 0.0    Absolute Immature Granulocytes      <=0.4 10e3/uL 0.0    Sodium      136 - 145 mmol/L 137    Potassium      3.4 - 5.3 mmol/L 4.1    Chloride      98 - 107 mmol/L 103    Carbon Dioxide (CO2)      22 - 29 mmol/L 23    Anion Gap      7 - 15 mmol/L 11    Urea Nitrogen      6.0 - 20.0 mg/dL 9.1    Creatinine      0.51 - 0.95 mg/dL 0.63    Calcium      8.6 - 10.0 mg/dL 9.0    Glucose      70 - 99 mg/dL 87    Alkaline Phosphatase      35 - 104 U/L 83    AST      0 - 45 U/L 22    ALT      0 - 50 U/L 14    Protein Total      6.4 - 8.3 g/dL 7.3    Albumin      3.5 - 5.2 g/dL 4.3    Bilirubin Total      <=1.2 mg/dL 0.4    GFR Estimate      >60 mL/min/1.73m2 >90       Legend:  (L) Low  (H) High    Physical Exam:  Vitals: There were no vitals taken for this visit.  SKIN: Teledermatology photos were reviewed; image quality and interpretability: acceptable. Image date: within 5 day.  - pustules on the forehead and jawline  - No other lesions of concern on areas examined.      Medications:  Current Outpatient Medications   Medication    Acetaminophen (TYLENOL PO)    amoxicillin (AMOXIL) 500 MG capsule    azelaic acid (FINACIA) 15 % external gel    benzoyl peroxide 5 % external liquid    cholecalciferol 50 MCG (2000 UT) CAPS    clindamycin (CLEOCIN T) 1 % external lotion    clobetasol (TEMOVATE) 0.05 % external ointment    dapsone (ACZONE) 25 MG tablet    ferrous gluconate (FERGON) 324 (38 Fe) MG tablet    IBUPROFEN PO    ketoconazole (NIZORAL) 2 % external shampoo    tretinoin (RETIN-A) 0.025 % external cream    clotrimazole (LOTRIMIN) 1 % external cream    Riboflavin (B-2) 50 MG TABS     Current Facility-Administered Medications   Medication    lidocaine 1% with EPINEPHrine 1:100,000 injection 3 mL      Past Medical/Surgical History:   Patient Active Problem List   Diagnosis    Acne vulgaris    Hidradenitis suppurativa    Migraine with aura and without status migrainosus, not intractable     No past medical history on file.    CC No referring provider defined for this encounter. on close of this encounter.

## 2023-10-23 NOTE — NURSING NOTE
Teledermatology Nurse Call Patients:     Are you in the [facility] at the time of the encounter? yes    Today's visit will be billed to you and your insurance.    A teledermatology visit is not as thorough as an in-person visit and the quality of the photograph sent may not be of the same quality as that taken by the dermatology clinic.      Pt states she has pain in the groin from the flare ups- and flare ups on the face, and pt has an upset stomach.     When asked about the PHQ2- Pt states that she has a therapist that she sees.

## 2023-10-24 ENCOUNTER — MYC MEDICAL ADVICE (OUTPATIENT)
Dept: DERMATOLOGY | Facility: CLINIC | Age: 38
End: 2023-10-24
Payer: COMMERCIAL

## 2023-11-02 ENCOUNTER — LAB (OUTPATIENT)
Dept: LAB | Facility: CLINIC | Age: 38
End: 2023-11-02
Payer: COMMERCIAL

## 2023-11-02 ENCOUNTER — TELEPHONE (OUTPATIENT)
Dept: FAMILY MEDICINE | Facility: CLINIC | Age: 38
End: 2023-11-02

## 2023-11-02 DIAGNOSIS — Z51.81 MEDICATION MONITORING ENCOUNTER: ICD-10-CM

## 2023-11-02 LAB
ALT SERPL W P-5'-P-CCNC: 24 U/L (ref 0–50)
AST SERPL W P-5'-P-CCNC: 24 U/L (ref 0–45)
BASOPHILS # BLD AUTO: 0 10E3/UL (ref 0–0.2)
BASOPHILS NFR BLD AUTO: 1 %
EOSINOPHIL # BLD AUTO: 0.2 10E3/UL (ref 0–0.7)
EOSINOPHIL NFR BLD AUTO: 3 %
ERYTHROCYTE [DISTWIDTH] IN BLOOD BY AUTOMATED COUNT: 14.6 % (ref 10–15)
FASTING STATUS PATIENT QL REPORTED: YES
HCG INTACT+B SERPL-ACNC: <1 MIU/ML
HCT VFR BLD AUTO: 33.8 % (ref 35–47)
HGB BLD-MCNC: 10.1 G/DL (ref 11.7–15.7)
IMM GRANULOCYTES # BLD: 0 10E3/UL
IMM GRANULOCYTES NFR BLD: 0 %
LYMPHOCYTES # BLD AUTO: 1.4 10E3/UL (ref 0.8–5.3)
LYMPHOCYTES NFR BLD AUTO: 25 %
MCH RBC QN AUTO: 27.2 PG (ref 26.5–33)
MCHC RBC AUTO-ENTMCNC: 29.9 G/DL (ref 31.5–36.5)
MCV RBC AUTO: 91 FL (ref 78–100)
MONOCYTES # BLD AUTO: 0.4 10E3/UL (ref 0–1.3)
MONOCYTES NFR BLD AUTO: 8 %
NEUTROPHILS # BLD AUTO: 3.5 10E3/UL (ref 1.6–8.3)
NEUTROPHILS NFR BLD AUTO: 63 %
PLATELET # BLD AUTO: 236 10E3/UL (ref 150–450)
RBC # BLD AUTO: 3.72 10E6/UL (ref 3.8–5.2)
TRIGL SERPL-MCNC: 70 MG/DL
WBC # BLD AUTO: 5.5 10E3/UL (ref 4–11)

## 2023-11-02 PROCEDURE — 84460 ALANINE AMINO (ALT) (SGPT): CPT

## 2023-11-02 PROCEDURE — 85025 COMPLETE CBC W/AUTO DIFF WBC: CPT

## 2023-11-02 PROCEDURE — 84702 CHORIONIC GONADOTROPIN TEST: CPT

## 2023-11-02 PROCEDURE — 36415 COLL VENOUS BLD VENIPUNCTURE: CPT

## 2023-11-02 PROCEDURE — 84478 ASSAY OF TRIGLYCERIDES: CPT

## 2023-11-02 PROCEDURE — 84450 TRANSFERASE (AST) (SGOT): CPT

## 2023-11-02 NOTE — TELEPHONE ENCOUNTER
Reason for Call:  Appointment Request    Patient requesting this type of appt:  Labs ordered by Dermatology    Requested provider:  Lab appointment     Reason patient unable to be scheduled: Not within requested timeframe    When does patient want to be seen/preferred time: 1-2 days    Comments: anytime between 9:30am - 1pm    Could we send this information to you in [employer] or would you prefer to receive a phone call?:   Patient would prefer a phone call   Okay to leave a detailed message?: Yes at Cell number on file:    Telephone Information:   Mobile [phone]       Call taken on 11/2/2023 at 10:44 AM by Marina Dhillon

## 2023-11-24 ENCOUNTER — TELEPHONE (OUTPATIENT)
Dept: DERMATOLOGY | Facility: CLINIC | Age: 38
End: 2023-11-24
Payer: COMMERCIAL

## 2023-11-27 ENCOUNTER — VIRTUAL VISIT (OUTPATIENT)
Dept: DERMATOLOGY | Facility: CLINIC | Age: 38
End: 2023-11-27
Attending: DERMATOLOGY
Payer: COMMERCIAL

## 2023-11-27 VITALS — WEIGHT: 138 LBS

## 2023-11-27 DIAGNOSIS — L70.0 NODULOCYSTIC ACNE: Primary | ICD-10-CM

## 2023-11-27 DIAGNOSIS — L08.0 PYODERMA FACIALE: ICD-10-CM

## 2023-11-27 PROCEDURE — 99442 PR PHYSICIAN TELEPHONE EVALUATION 11-20 MIN: CPT | Mod: 95 | Performed by: DERMATOLOGY

## 2023-11-27 RX ORDER — MINOCYCLINE HYDROCHLORIDE 100 MG/1
100 CAPSULE ORAL 2 TIMES DAILY
Qty: 60 CAPSULE | Refills: 3 | Status: SHIPPED | OUTPATIENT
Start: 2023-11-27 | End: 2024-03-28

## 2023-11-27 NOTE — PROGRESS NOTES
[facility] Dermatology Note  Encounter Date: Nov 27, 2023  Store-and-Forward and Telephone ([phone]) [phone] . Location of teledermatologist: [facility].  Start time: 11:30am. End time: 11:45am.       Dermatology Problem List:  1. Nodulocystic acne  -flare of R forehead 08/25/22 in dermatomal distribution ddx zoster vs Pityrosporum folliculitis vs pyoderma faciale.amoxcillin helpful with some GI upset  -s/p valtrex without improvement  -bacterial culture with normal jonathan  -swabbed for zoster PCR 08/25/22   2. Hidradenitis suppurativa, Beltran stage II  -Current: Stelara, benzoyl peroxide 2.5% wash, clobetasol 0.05% ointment BID PRN to groin  -Past: oral antibiotics (minocycline and rifampin), Humira (migraines), isotretinoin (dryness, headaches and dizziness),  anakinra prescribed (did not start), ustekinumab (did not start)  - ILK did help the inflammatory on cheek  - Prednisone made her worse and made dizziness worse.  - Last quantiferon gold: 2018  - Hep C ab neg 11/3/20  - HIV neg 11/3/20  - Hep B: surface ab +, surface antigen neg c/w immunity 8/12/18  - CBC last wnl 11/3/20  - BMP wnl 12/27/20  3. 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 Hx of Afib after childbirth   5. Wart, left heel  6. Hx of high-risk HPV  7. 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 Franco. Pt stopped dapsone multiple times now.  It could be retried.     - asking for sarecycline, understand GI, eye, headache, sun effects with this. She will take minocycline until she can see if sarecycline is covered. She had GI effects with doxycyline. Still with some on  minocycline.   -continue azelaic acid, clindamycin and also tretinoin     #HS, dennis stage II and has had Ndyag, does well with prednisone  - she stopped her dapsone  -clindamycin gentle for flares and BP  -on minocycline     # laser hair removal at home for legs  -recommend tria laser, reviewed risks including skin discoloration    Procedures Performed:    None    Follow-up: 3 months photos and phone    Staff:     Danielle Shepard MD    Department of Dermatology  [facility]: Phone: [phone], Fax:[phone]  [facility]: Phone: [phone], Fax: [phone]    ____________________________________________    CC: No chief complaint on file.    HPI:  Ms. Sophia De Leon is a(n) 37 year old female who presents today as a return patient for acne    Currently on minocycline. Dizziness stopped when she stopped dapsone    She stopped her dapsone    She uses topical benzoyl peroxide.     Uses intermittent clindamycin, tretinoin and azelaic acid in rotation      Patient is otherwise feeling well, without additional skin concerns.    Labs Reviewed:  Last CBC and alt and ast reviewed from 11/2024    Physical Exam:  Vitals: There were no vitals taken for this visit.  SKIN: Teledermatology photos were reviewed; image quality and interpretability: acceptable. Image date: yesterday.  - fewer acneiform papules and pustules  - No other lesions of concern on areas examined.     Medications:  Current Outpatient Medications   Medication    Acetaminophen (TYLENOL PO)    azelaic acid (FINACIA) 15 % external gel    benzoyl peroxide 5 % external liquid    cholecalciferol 50 MCG (2000 UT) CAPS    clindamycin (CLEOCIN T) 1 % external lotion    clobetasol (TEMOVATE) 0.05 % external ointment    clotrimazole (LOTRIMIN) 1 % external cream    dapsone (ACZONE) 25 MG tablet    ferrous gluconate (FERGON) 324 (38 Fe) MG  tablet    IBUPROFEN PO    ketoconazole (NIZORAL) 2 % external shampoo    minocycline (MINOCIN) 100 MG capsule    Riboflavin (B-2) 50 MG TABS    tretinoin (RETIN-A) 0.025 % external cream     Current Facility-Administered Medications   Medication    lidocaine 1% with EPINEPHrine 1:100,000 injection 3 mL      Past Medical/Surgical History:   Patient Active Problem List   Diagnosis    Acne vulgaris    Hidradenitis suppurativa    Migraine with aura and without status migrainosus, not intractable     No past medical history on file.    CC Danielle Shepard MD  [address]  [place] [address] on close of this encounter.

## 2023-11-27 NOTE — PATIENT INSTRUCTIONS
[facility] Dermatology Visit    Thank you for allowing us to participate in your care. Your findings, instructions and follow-up plan are as follows:         When should I call my doctor?  If you are worsening or not improving, please, contact us or seek urgent care as noted below.     Who should I call with questions (adults)?  [facility] (adult and pediatric): [phone]  [facility] (adult): [phone]  For urgent needs outside of business hours call the [facility] at [phone] and ask for the dermatology resident on call  If this is a medical emergency and you are unable to reach an ER, Call 911    Who should I call with questions (pediatric)?  [facility]- Pediatric Dermatology  Dr. Diana Huston, Dr. Mahendra Dela Cruz, Dr. Omayra Lerma, Maxine Carter, PA  Dr. Angelique Almonte, Dr. Brenda Kovacs & Dr. Henri Underwood  Non Urgent  Nurse Triage Line; [phone]- Angelica and Jannie RN Care Coordinators   [place] (/Complex ) [phone]    If you need a prescription refill, please contact your pharmacy. Refills are approved or denied by our physicians during normal business hours, Monday through Fridays  Per office policy, refills will not be granted if you have not been seen within the past year (or sooner depending on your child's condition).    Scheduling Information:  Pediatric Appointment Scheduling and Call Center [phone]  Radiology Scheduling- [phone]  Sedation Unit Scheduling- [phone]  [place] Scheduling- General [phone]; Pediatric Dermatology [phone]  Main  Services: [phone]  Uzbek: [phone]  Iraqi: [phone]  Hmong/Darren/Guamanian: [phone]  Preadmission Nursing Department Fax Number: [phone] (fax all pre-operative paperwork to this number)    For urgent matters arising during evenings, weekends, or  holidays that cannot wait for normal business hours please call [phone] and ask for the dermatology resident on call to be paged.

## 2023-11-27 NOTE — LETTER
11/27/2023         RE: Sophia De Leon  [address] [address]        Dear Colleague,    Thank you for referring your patient, Sophia De Leon, to the [facility]. Please see a copy of my visit note below.    [facility] Dermatology Note  Encounter Date: Nov 27, 2023  Store-and-Forward and Telephone ([phone] . Location of teledermatologist: [facility].  Start time: 11:30am. End time: 11:45am.       Dermatology Problem List:  1. Nodulocystic acne  -flare of R forehead 08/25/22 in dermatomal distribution ddx zoster vs Pityrosporum folliculitis vs pyoderma faciale.amoxcillin helpful with some GI upset  -s/p valtrex without improvement  -bacterial culture with normal jonathan  -swabbed for zoster PCR 08/25/22   2. Hidradenitis suppurativa, Beltran stage II  -Current: Stelara, benzoyl peroxide 2.5% wash, clobetasol 0.05% ointment BID PRN to groin  -Past: oral antibiotics (minocycline and rifampin), Humira (migraines), isotretinoin (dryness, headaches and dizziness),  anakinra prescribed (did not start), ustekinumab (did not start)  - ILK did help the inflammatory on cheek  - Prednisone made her worse and made dizziness worse.  - Last quantiferon gold: 2018  - Hep C ab neg 11/3/20  - HIV neg 11/3/20  - Hep B: surface ab +, surface antigen neg c/w immunity 8/12/18  - CBC last wnl 11/3/20  - BMP wnl 12/27/20  3. 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 Hx of Afib after childbirth   5. Wart, left heel  6. Hx of high-risk HPV  7. 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 Franco. Pt stopped dapsone  multiple times now.  It could be retried.     - asking for sarecycline, understand GI, eye, headache, sun effects with this. She will take minocycline until she can see if sarecycline is covered. She had GI effects with doxycyline. Still with some on minocycline.   -continue azelaic acid, clindamycin and also tretinoin     #HS, dennis stage II and has had Ndyag, does well with prednisone  - she stopped her dapsone  -clindamycin gentle for flares and BP  -on minocycline     # laser hair removal at home for legs  -recommend tria laser, reviewed risks including skin discoloration    Procedures Performed:    None    Follow-up: 3 months photos and phone    Staff:     Danielle Shepard MD    Department of Dermatology  [facility]: Phone: [phone], Fax:[phone]  [facility]: Phone: [phone], Fax: [phone]    ____________________________________________    CC: No chief complaint on file.    HPI:  Ms. Sophia De Leon is a(n) 37 year old female who presents today as a return patient for acne    Currently on minocycline. Dizziness stopped when she stopped dapsone    She stopped her dapsone    She uses topical benzoyl peroxide.     Uses intermittent clindamycin, tretinoin and azelaic acid in rotation      Patient is otherwise feeling well, without additional skin concerns.    Labs Reviewed:  Last CBC and alt and ast reviewed from 11/2024    Physical Exam:  Vitals: There were no vitals taken for this visit.  SKIN: Teledermatology photos were reviewed; image quality and interpretability: acceptable. Image date: yesterday.  - fewer acneiform papules and pustules  - No other lesions of concern on areas examined.     Medications:  Current Outpatient Medications   Medication     Acetaminophen (TYLENOL PO)     azelaic acid (FINACIA) 15 % external gel     benzoyl peroxide 5 % external liquid      cholecalciferol 50 MCG (2000 UT) CAPS     clindamycin (CLEOCIN T) 1 % external lotion     clobetasol (TEMOVATE) 0.05 % external ointment     clotrimazole (LOTRIMIN) 1 % external cream     dapsone (ACZONE) 25 MG tablet     ferrous gluconate (FERGON) 324 (38 Fe) MG tablet     IBUPROFEN PO     ketoconazole (NIZORAL) 2 % external shampoo     minocycline (MINOCIN) 100 MG capsule     Riboflavin (B-2) 50 MG TABS     tretinoin (RETIN-A) 0.025 % external cream     Current Facility-Administered Medications   Medication     lidocaine 1% with EPINEPHrine 1:100,000 injection 3 mL      Past Medical/Surgical History:   Patient Active Problem List   Diagnosis     Acne vulgaris     Hidradenitis suppurativa     Migraine with aura and without status migrainosus, not intractable     No past medical history on file.    CC Danielle Shepard MD  [address]  [place] [address] on close of this encounter.      Again, thank you for allowing me to participate in the care of your patient.        Sincerely,        Danielle Shepard MD

## 2023-11-30 ENCOUNTER — TELEPHONE (OUTPATIENT)
Dept: DERMATOLOGY | Facility: CLINIC | Age: 38
End: 2023-11-30
Payer: COMMERCIAL

## 2023-11-30 DIAGNOSIS — B00.1 HERPES LABIALIS: ICD-10-CM

## 2023-11-30 DIAGNOSIS — L73.2 HIDRADENITIS SUPPURATIVA: ICD-10-CM

## 2023-11-30 RX ORDER — CLOBETASOL PROPIONATE 0.5 MG/G
OINTMENT TOPICAL
Qty: 60 G | Refills: 0 | Status: SHIPPED | OUTPATIENT
Start: 2023-11-30

## 2023-11-30 NOTE — TELEPHONE ENCOUNTER
Refill request for Clobetasol Prop 0.05% oint 30gm from [facility] pharmacy on [address] in [place], [place].     Louis Emerson on 11/30/2023 at 10:43 AM

## 2023-11-30 NOTE — TELEPHONE ENCOUNTER
clobetasol (TEMOVATE) 0.05 % external ointment   60 g 1 6/5/2023     Process 3    11/27/2023  [facility]    Danielle Shepard MD  Dermatology    Request per pt call.

## 2023-11-30 NOTE — TELEPHONE ENCOUNTER
PT waiting on medication Seysara 100mg tablets from [facility] pharmacy (no address was given).     Louis Emerson on 11/30/2023 at 11:42 AM

## 2023-12-02 DIAGNOSIS — B37.2 CANDIDAL INTERTRIGO: ICD-10-CM

## 2023-12-04 NOTE — TELEPHONE ENCOUNTER
clotrimazole (LOTRIMIN) 1 % external cream   45 g 3 6/14/2022 11/27/2023  [facility] Danielle Naylor MD  Dermatology    Process 1    routed because: not mentioned in last note to continue. Rf?

## 2023-12-05 RX ORDER — CLOTRIMAZOLE 1 %
CREAM (GRAM) TOPICAL 2 TIMES DAILY
Qty: 45 G | Refills: 3 | Status: SHIPPED | OUTPATIENT
Start: 2023-12-05

## 2023-12-05 NOTE — TELEPHONE ENCOUNTER
PRIOR AUTHORIZATION DENIED    Medication: SEYSARA 100 MG PO TABS    Insurance Company: [employer] - Phone [phone] Fax [phone]    Denial Date: 12/4/2023    Denial Reason(s):  doesn't have a rebate agreement with the insurance plan.

## 2023-12-13 ENCOUNTER — TELEPHONE (OUTPATIENT)
Dept: DERMATOLOGY | Facility: CLINIC | Age: 38
End: 2023-12-13
Payer: COMMERCIAL

## 2023-12-13 NOTE — TELEPHONE ENCOUNTER
Via my chart patient was rescheduled forthe following:    Appointment type: Return HS  Provider: Dr. Franco  Return date: 03-28-24  Specialty phone number: [phone]

## 2024-03-28 ENCOUNTER — LAB (OUTPATIENT)
Dept: LAB | Facility: CLINIC | Age: 39
End: 2024-03-28
Payer: COMMERCIAL

## 2024-03-28 ENCOUNTER — OFFICE VISIT (OUTPATIENT)
Dept: DERMATOLOGY | Facility: CLINIC | Age: 39
End: 2024-03-28
Payer: COMMERCIAL

## 2024-03-28 VITALS — DIASTOLIC BLOOD PRESSURE: 66 MMHG | SYSTOLIC BLOOD PRESSURE: 112 MMHG | HEART RATE: 83 BPM

## 2024-03-28 DIAGNOSIS — L70.0 NODULOCYSTIC ACNE: ICD-10-CM

## 2024-03-28 DIAGNOSIS — L73.2 HIDRADENITIS SUPPURATIVA: Primary | ICD-10-CM

## 2024-03-28 DIAGNOSIS — L08.0 PYODERMA FACIALE: ICD-10-CM

## 2024-03-28 LAB — HCG UR QL: NEGATIVE

## 2024-03-28 PROCEDURE — 17110 DESTRUCTION B9 LES UP TO 14: CPT | Performed by: DERMATOLOGY

## 2024-03-28 PROCEDURE — 81025 URINE PREGNANCY TEST: CPT | Performed by: PATHOLOGY

## 2024-03-28 PROCEDURE — 99214 OFFICE O/P EST MOD 30 MIN: CPT | Mod: 25 | Performed by: DERMATOLOGY

## 2024-03-28 RX ORDER — PREDNISONE 2.5 MG/1
TABLET ORAL
Qty: 154 TABLET | Refills: 0 | Status: SHIPPED | OUTPATIENT
Start: 2024-03-28 | End: 2024-05-16

## 2024-03-28 ASSESSMENT — PAIN SCALES - GENERAL: PAINLEVEL: SEVERE PAIN (6)

## 2024-03-28 NOTE — LETTER
3/28/2024       RE: Sophia De Leon  [address] [address]     Dear Colleague,    Thank you for referring your patient, Sophia De Leon, to the [facility] DERMATOLOGY [facility] [place] [facility]. Please see a copy of my visit note below.    [facility] Dermatology Note  Encounter Date: Mar 28, 2024  Office Visit     Dermatology Problem List:  1. Nodulocystic acne  -flare of R forehead 08/25/22 in dermatomal distribution ddx zoster vs Pityrosporum folliculitis vs pyoderma faciale.  -s/p valtrex without improvement  -bacterial culture with normal jonathan  -swabbed for zoster PCR 08/25/22   2. Hidradenitis suppurativa, Beltran stage II  -Current: , benzoyl peroxide 2.5% wash, minocycline 100mg BID  -Past: oral antibiotics (minocycline and rifampin), Humira (migraines), isotretinoin (dryness, headaches and dizziness),  anakinra prescribed (did not start), ustekinumab (did not start)  - ILK did help the inflammatory on cheek  - Prednisone made her worse and made dizziness worse.  - Last quantiferon gold: 2018  - Hep C ab neg 11/3/20  - HIV neg 11/3/20  - Hep B: surface ab +, surface antigen neg c/w immunity 8/12/18  - CBC last wnl 11/3/20  - BMP wnl 12/27/20  3. 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 Hx of Afib after childbirth   5. Wart, left heel  6. Hx of high-risk HPV  7. Seborrheic dermatitis, scalp  - ketoconazole 2% shampoo, lidex 0.05% solution    ____________________________________________    Assessment & Plan:  #HS  - Continue minocycline 100mg twice a day   - Topical spironolactone, tretinoin, hyaluronic acid daily for face, chest, back and groin  - Plan to start isotretinoin for acne/rosacea and will see how HS responds. She is aware that typically HS does not respond as well to isotretinoin compared to acne/rosacea  - Will start  prednisone burst at that time and stop  minocycline     # acne/rosacea  - BP when able due to irritation  - Spironolactone 5%, tretinoin 0.025% , hyaluronic acid gel at night  - Azelaic acid cream in AM    # Verruca plantaris.   - Cryotherapy performed today (see procedure note(s) below).   - Repeat with Dr Shepard in 1 month    Procedures Performed:   Cryotherapy procedure note: After verbal consent and discussion of risks and benefits including but no limited to dyspigmentation/scar, blister, and pain, 1 was(were) treated with 1-2mm freeze border for 2 cycles with liquid nitrogen. Post cryotherapy instructions were provided.      4 weeks with Dr Shepard  Follow-up: 12 weeks via phone      Staff and Scribe:     Scribe Disclosure:   I, Nancy Hsu, am serving as a scribe; to document services personally performed by Timmy Franco MD -based on data collection and the provider's statements to me.     Provider Disclosure:  I agree with above History, Review of Systems, Physical exam and Plan.  I have reviewed the content of the documentation and have edited it as needed. I have personally performed the services documented here and the documentation accurately represents those services and the decisions I have made.      Electronically signed by:  Provider Disclosure:   The documentation recorded by the scribe accurately reflects the services I personally performed and the decisions made by me.    Timmy Franco MD, FAAD    Departments of Internal Medicine and Dermatology  [facility]  [phone]        ____________________________________________    CC: Derm Problem (Patient reports some flaring on their thighs and in their pelvis area at this time. The patient reports minimal changes to symptoms. )    HPI:  Ms. Sophia De Leon is a(n) 38 year old female who presents today as a return patient for HS.    Last seen by Dr. Shepard virtual visit 06/05/2023. She was to continue Vitamin E 800 international unit(s),  "clindamycin    Today, she reports, she has had some flaring. Was taking dapsone and amoxicillin, which did help. She states that she felt \"pretty loopy on dapsone.\"  . While taking spironolactone she was feeling very weak and blood pressure was low. She was taking a low dose of spironolactone.     She reports, that she does have GI problems.    Patient is otherwise feeling well, without additional skin concerns.    HS Nurse Assessment        1/27/2022     3:00 PM 8/25/2022     1:56 PM 3/28/2024     2:17 PM   Nurse Assessment Data   Over the past 30 days how many old lesions flared back up? 3 7 2   Over the past 30 days how many new lesions did you get? 3 1 or more \"depending on what is on my head\" 2-3   Over the past week, how many dressing changes do you do each day? 2 3+ 3+   Over the past week, has your wound drainage been: Moderate Severe Mild   Rate your HS overall from 0 - 10 (0 = no disease, 10 = worst) over the past week:  7-8 9 9   Rate your pain score from 0 - 10 (0 = no disease, 10 = worst) for the most painful/symptomatic lesion in the past week:  8 10 - Worst Pain 9   Over the past week, how much has HS influenced your quality of life? very much extremely moderately       Physical Exam:  Vitals: /66 (BP Location: Left arm, Patient Position: Sitting)   Pulse 83   SKIN: Full skin, which includes the head/face, both arms, chest, back, abdomen,both legs, genitalia and/or groin buttocks, digits and/or nails, was examined.  - ice pick scarring on cheek   - few pustules on the chin  - few acneiform papules on cheek, chest  - pustules on neck, shoulder, and upper back  - several of acneiform papules with clusters on the back  - firm papule behind left ear, prior HS scarring  - scar papule on right cheek  - There is a verrucous papule with thrombosed capillaries interrupting dermatoglyphics on the Left heel..   - No other lesions of concern on areas examined.     HS Data      10/29/2020     5:31 PM " 8/25/2022     3:19 PM 3/28/2024     3:49 PM   HS Exam Data   LC Type LC1 LC1 LC1   Clinical Subtypes Regular type Regular type Regular type   Acne?  Yes Yes   Acne Comments  improved from last visit    Dissecting Cellulitis?  No No   Total Beltran Stage II II II   Total Inflammatory Nodules 3 2 3   Total Abcesses 0 0 0   Total Draining Tunnels 2 0 0   Total Abscess and Nodule Count 3 2 3   IHS4 Score  11 2 3   Total  HASI Score 20 13 16   HS-PGA 4  2     Medications:  Current Outpatient Medications   Medication    Acetaminophen (TYLENOL PO)    azelaic acid (FINACIA) 15 % external gel    benzoyl peroxide 5 % external liquid    cholecalciferol 50 MCG (2000 UT) CAPS    clindamycin (CLEOCIN T) 1 % external lotion    clobetasol (TEMOVATE) 0.05 % external ointment    clotrimazole (LOTRIMIN) 1 % external cream    ferrous gluconate (FERGON) 324 (38 Fe) MG tablet    IBUPROFEN PO    ketoconazole (NIZORAL) 2 % external shampoo    minocycline (MINOCIN) 100 MG capsule    Riboflavin (B-2) 50 MG TABS    Sarecycline HCl 100 MG TABS    tretinoin (RETIN-A) 0.025 % external cream     Current Facility-Administered Medications   Medication    lidocaine 1% with EPINEPHrine 1:100,000 injection 3 mL      Past Medical History:   Patient Active Problem List   Diagnosis    Acne vulgaris    Hidradenitis suppurativa    Migraine with aura and without status migrainosus, not intractable

## 2024-03-28 NOTE — PATIENT INSTRUCTIONS
HS  -Stop topical tretinoin  - Start nightly spironolactone/tretinoin gel  - Continue minocycline for now   - Plan to start isotretinoin in 1 month    - Labs today  - Pharmacy referral to start isotretinoin  - Start predniosne taper when starting isotretinoin. Need to stop minocycline when starting isotretinoin

## 2024-03-28 NOTE — PROGRESS NOTES
[facility] Dermatology Note  Encounter Date: Mar 28, 2024  Office Visit     Dermatology Problem List:  1. Nodulocystic acne  -flare of R forehead 08/25/22 in dermatomal distribution ddx zoster vs Pityrosporum folliculitis vs pyoderma faciale.  -s/p valtrex without improvement  -bacterial culture with normal jonathan  -swabbed for zoster PCR 08/25/22   2. Hidradenitis suppurativa, Beltran stage II  -Current: , benzoyl peroxide 2.5% wash, minocycline 100mg BID  -Past: oral antibiotics (minocycline and rifampin), Humira (migraines), isotretinoin (dryness, headaches and dizziness),  anakinra prescribed (did not start), ustekinumab (did not start)  - ILK did help the inflammatory on cheek  - Prednisone made her worse and made dizziness worse.  - Last quantiferon gold: 2018  - Hep C ab neg 11/3/20  - HIV neg 11/3/20  - Hep B: surface ab +, surface antigen neg c/w immunity 8/12/18  - CBC last wnl 11/3/20  - BMP wnl 12/27/20  3. 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 Hx of Afib after childbirth   5. Wart, left heel  6. Hx of high-risk HPV  7. Seborrheic dermatitis, scalp  - ketoconazole 2% shampoo, lidex 0.05% solution    ____________________________________________    Assessment & Plan:  #HS  - Continue minocycline 100mg twice a day   - Topical spironolactone, tretinoin, hyaluronic acid daily for face, chest, back and groin  - Plan to start isotretinoin for acne/rosacea and will see how HS responds. She is aware that typically HS does not respond as well to isotretinoin compared to acne/rosacea  - Will start  prednisone burst at that time and stop minocycline     # acne/rosacea  - BP when able due to irritation  - Spironolactone 5%, tretinoin 0.025% , hyaluronic acid gel at night  - Azelaic acid cream in AM    # Verruca plantaris.   - Cryotherapy performed today (see procedure note(s) below).   - Repeat with Dr Shepard in 1 month    Procedures Performed:  "  Cryotherapy procedure note: After verbal consent and discussion of risks and benefits including but no limited to dyspigmentation/scar, blister, and pain, 1 was(were) treated with 1-2mm freeze border for 2 cycles with liquid nitrogen. Post cryotherapy instructions were provided.      4 weeks with Dr Shepard  Follow-up: 12 weeks via phone      Staff and Scribe:     Scribe Disclosure:   I, Nancy Hsu, am serving as a scribe; to document services personally performed by Timmy Franco MD -based on data collection and the provider's statements to me.     Provider Disclosure:  I agree with above History, Review of Systems, Physical exam and Plan.  I have reviewed the content of the documentation and have edited it as needed. I have personally performed the services documented here and the documentation accurately represents those services and the decisions I have made.      Electronically signed by:  Provider Disclosure:   The documentation recorded by the scribe accurately reflects the services I personally performed and the decisions made by me.    Timmy Franco MD, FAAD    Departments of Internal Medicine and Dermatology  [facility]  [phone]        ____________________________________________    CC: Derm Problem (Patient reports some flaring on their thighs and in their pelvis area at this time. The patient reports minimal changes to symptoms. )    HPI:  Ms. Sophia De Leon is a(n) 38 year old female who presents today as a return patient for HS.    Last seen by Dr. Shepard virtual visit 06/05/2023. She was to continue Vitamin E 800 international unit(s), clindamycin    Today, she reports, she has had some flaring. Was taking dapsone and amoxicillin, which did help. She states that she felt \"pretty loopy on dapsone.\"  . While taking spironolactone she was feeling very weak and blood pressure was low. She was taking a low dose of spironolactone.     She reports, that she " "does have GI problems.    Patient is otherwise feeling well, without additional skin concerns.    HS Nurse Assessment        1/27/2022     3:00 PM 8/25/2022     1:56 PM 3/28/2024     2:17 PM   Nurse Assessment Data   Over the past 30 days how many old lesions flared back up? 3 7 2   Over the past 30 days how many new lesions did you get? 3 1 or more \"depending on what is on my head\" 2-3   Over the past week, how many dressing changes do you do each day? 2 3+ 3+   Over the past week, has your wound drainage been: Moderate Severe Mild   Rate your HS overall from 0 - 10 (0 = no disease, 10 = worst) over the past week:  7-8 9 9   Rate your pain score from 0 - 10 (0 = no disease, 10 = worst) for the most painful/symptomatic lesion in the past week:  8 10 - Worst Pain 9   Over the past week, how much has HS influenced your quality of life? very much extremely moderately       Physical Exam:  Vitals: /66 (BP Location: Left arm, Patient Position: Sitting)   Pulse 83   SKIN: Full skin, which includes the head/face, both arms, chest, back, abdomen,both legs, genitalia and/or groin buttocks, digits and/or nails, was examined.  - ice pick scarring on cheek   - few pustules on the chin  - few acneiform papules on cheek, chest  - pustules on neck, shoulder, and upper back  - several of acneiform papules with clusters on the back  - firm papule behind left ear, prior HS scarring  - scar papule on right cheek  - There is a verrucous papule with thrombosed capillaries interrupting dermatoglyphics on the Left heel..   - No other lesions of concern on areas examined.     HS Data      10/29/2020     5:31 PM 8/25/2022     3:19 PM 3/28/2024     3:49 PM   HS Exam Data   LC Type LC1 LC1 LC1   Clinical Subtypes Regular type Regular type Regular type   Acne?  Yes Yes   Acne Comments  improved from last visit    Dissecting Cellulitis?  No No   Total Beltran Stage II II II   Total Inflammatory Nodules 3 2 3   Total Abcesses 0 0 0   Total " Draining Tunnels 2 0 0   Total Abscess and Nodule Count 3 2 3   IHS4 Score  11 2 3   Total  HASI Score 20 13 16   HS-PGA 4  2     Medications:  Current Outpatient Medications   Medication    Acetaminophen (TYLENOL PO)    azelaic acid (FINACIA) 15 % external gel    benzoyl peroxide 5 % external liquid    cholecalciferol 50 MCG (2000 UT) CAPS    clindamycin (CLEOCIN T) 1 % external lotion    clobetasol (TEMOVATE) 0.05 % external ointment    clotrimazole (LOTRIMIN) 1 % external cream    ferrous gluconate (FERGON) 324 (38 Fe) MG tablet    IBUPROFEN PO    ketoconazole (NIZORAL) 2 % external shampoo    minocycline (MINOCIN) 100 MG capsule    Riboflavin (B-2) 50 MG TABS    Sarecycline HCl 100 MG TABS    tretinoin (RETIN-A) 0.025 % external cream     Current Facility-Administered Medications   Medication    lidocaine 1% with EPINEPHrine 1:100,000 injection 3 mL      Past Medical History:   Patient Active Problem List   Diagnosis    Acne vulgaris    Hidradenitis suppurativa    Migraine with aura and without status migrainosus, not intractable

## 2024-03-28 NOTE — NURSING NOTE
Patient filled out and signed iPledge forms.  Birth control: Abstinence  Text and email alerts    VANE Bazan

## 2024-03-28 NOTE — NURSING NOTE
Chief Complaint   Patient presents with    Derm Problem     Patient reports some flaring on their thighs and in their pelvis area at this time. The patient reports minimal changes to symptoms.      Jayne Goodman LPN

## 2024-03-29 ENCOUNTER — TELEPHONE (OUTPATIENT)
Dept: DERMATOLOGY | Facility: CLINIC | Age: 39
End: 2024-03-29
Payer: COMMERCIAL

## 2024-03-29 NOTE — TELEPHONE ENCOUNTER
Patient Contactedand schedule the following:    Appointment type: Return HS  Provider: Dr. Franco  Return date: 7/11/24  Specialty phone number: [phone]

## 2024-04-02 NOTE — NURSING NOTE
Pt stated nothing has changed with medications since the last time she was seen in Derm. Declined review.       Chief Complaint   Patient presents with    RECHECK     Teledermatology Nurse Call Patients:     Are you in the state [facility] at the time of the encounter? YES    Today's visit will be billed to you and your insurance.    A teledermatology visit is not as thorough as an in-person visit and the quality of the photograph sent may not be of the same quality as that taken by the dermatology clinic.    
3 = A little assistance

## 2024-04-05 ENCOUNTER — TELEPHONE (OUTPATIENT)
Dept: DERMATOLOGY | Facility: CLINIC | Age: 39
End: 2024-04-05
Payer: COMMERCIAL

## 2024-04-05 ENCOUNTER — VIRTUAL VISIT (OUTPATIENT)
Dept: RHEUMATOLOGY | Facility: CLINIC | Age: 39
End: 2024-04-05
Attending: DERMATOLOGY
Payer: COMMERCIAL

## 2024-04-05 DIAGNOSIS — Z78.9 TAKES DIETARY SUPPLEMENTS: ICD-10-CM

## 2024-04-05 DIAGNOSIS — L73.2 HIDRADENITIS SUPPURATIVA: ICD-10-CM

## 2024-04-05 DIAGNOSIS — L70.0 ACNE VULGARIS: Primary | ICD-10-CM

## 2024-04-05 DIAGNOSIS — L71.9 ROSACEA: ICD-10-CM

## 2024-04-05 RX ORDER — UREA 200 MG/G
CREAM TOPICAL 2 TIMES DAILY PRN
COMMUNITY
Start: 2024-03-08

## 2024-04-05 RX ORDER — ANTACID TABLETS 500 MG/1
1-2 TABLET, CHEWABLE ORAL PRN
COMMUNITY

## 2024-04-05 RX ORDER — FLUCONAZOLE 150 MG/1
150 TABLET ORAL
COMMUNITY
Start: 2023-05-09

## 2024-04-05 RX ORDER — RIZATRIPTAN BENZOATE 10 MG/1
10 TABLET, ORALLY DISINTEGRATING ORAL
COMMUNITY
Start: 2022-09-15

## 2024-04-05 RX ORDER — CETIRIZINE HYDROCHLORIDE 10 MG/1
10 TABLET ORAL DAILY PRN
COMMUNITY

## 2024-04-05 RX ORDER — ONDANSETRON 4 MG/1
4 TABLET, ORALLY DISINTEGRATING ORAL PRN
COMMUNITY
Start: 2023-05-03

## 2024-04-05 RX ORDER — MECLIZINE HCL 12.5 MG 12.5 MG/1
12.5-25 TABLET ORAL 3 TIMES DAILY PRN
COMMUNITY
Start: 2022-08-06

## 2024-04-05 RX ORDER — FLUTICASONE PROPIONATE 50 MCG
2 SPRAY, SUSPENSION (ML) NASAL PRN
COMMUNITY
Start: 2023-05-03

## 2024-04-05 NOTE — Clinical Note
OBINNA - that I met with her & she'll start isotretinoin after her next negative pregnancy screening on 5/2/24. She wants to start with the lowest dose of isotretinoin (even though I explained we'd typically start at ~0.5 mg/kg/day) due to side effects when she tried taking for 1-2 months back in 2009. She agreed to starting at 10 mg twice daily (0.31 mg/kg/day) & will titrate up from there.   Sarita

## 2024-04-05 NOTE — PROGRESS NOTES
Medication Therapy Management (MTM) Encounter    ASSESSMENT:                            Medication Adherence/Access:   Isotretinoin coverage -  Needs PA initiated to confirm coverage    Acne/Rosacea/Hidradenitis suppurativa:   Flaring, Dermatology recommended initiation of isotretinoin to see if this helps with both acne & HS. Will discontinue minocycline & start short prednisone burst when starting isotretinoin. Reviewed iPLEDGE requirements & plan for 4 week follow-up while on isotretinoin. Patient agreed to this & iPLEDGE requirements. Patient agrees to monthly pregnancy tests & abstinence while on treatment. Provided education on isotretinoin today including dosing, administration, side effects (dryness, joint pain, headaches, changes in mood, etc), precautions (teratogenic risks), and lab monitoring. Discussed we typically start with a dose of 20 mg twice daily & titrate up from there. However, patient prefers to start at lowest possible dose due to concerns with side effects. Will plan to start isotretinoin 10 mg twice daily (0.31 mg/kg/day). Goal total cumulative dose is 120-150mg/kg (7800 mg to 9750 mg).  Baseline labs completed. Will recheck TG, ALT & AST around 1-2 months after starting & until dose is stabilized. Patient will need to complete repeat 30-day pregnancy screening after 5/2/24 for iPLEDGE prior to starting. Scheduled this lab today.     Per UpToDate & Package Insert:   Initial: 0.5 mg/kg/day in 2 divided doses for 4 weeks, then increase dose to 1 mg/kg/day in 2 divided doses. For severe extensive cases (involving trunk, nuchal region, lower back, buttocks, thighs) may require higher doses up to 2 mg/kg/day in 2 divided doses.  Duration of therapy: Typically 15 to 20 weeks depending upon daily dose; a target cumulative dose range of 120 to 150 mg/kg has been recommended based on observed lower relapse rates with courses ?120 mg/kg and the plateau effect observed with higher cumulative total  doses of >150 mg/kg.     Supplements:   Continues on Vitamin D & iron supplement. Will recheck Vitamin D & iron labs at future lab appointment.     PLAN:                            Routed request to [facility] PA team to check coverage for isotretinoin     Scheduled for lab appointment for repeat pregnancy urine screening on 5/2/24 at 10:15 am at the [facility]     Pending negative pregnancy test on 5/2/24 & after MTM follow-up will plan for the following:   Start isotretinoin 10 mg twice daily at next follow-up   Discontinue minocycline. Start prednisone burst.     Future labs: check Vitamin D levels & Iron with next blood draw. Due for TG, ALT, & AST around 1-2 months after starting isotretinoin    Follow-up:   - MTM appointment: with me (Sarita Zazueta [facility]) by phone on Friday May 4th at 10:00 am   - MTM appointment: with me (Sarita Zazueta RP) by phone in around 8 weeks (6/4/24).  - Dermatology appointment: with Dr. Franco on 7/11/24     SUBJECTIVE/OBJECTIVE:                          Sophia De Leon is a 38 year old female called for an initial visit. She was referred to me from Dr. Timmy Franco MD.      Reason for visit: isotretinoin management.    Medication Adherence/Access:   Isotretinoin coverage:   -  Will order now to confirm to request PA     Acne/Rosacea/Hidradenitis suppurativa:   - isotretinoin 10 mg twice daily (estimated start date: ~5/4/24)  - clobetasol 0.05% ointment as needed to groin (HS)   - minocycline 100 mg twice daily (will stop when starting isotretinoin)  - prednisone burst (will start short course when minocycline is stopped)    - spironolactone 5% topical nightly (compounded, not yet started)   - tretinoin 0.025% cream nightly (alternates with azelaic acid)   - azelaic acid 15% gel nightly (alternates with tretinoin)   - clindamycin 1% lotion as needed for HS & acne   - benzoyl peroxide 5% wash daily as needed   Side effects: None at this time.    Patient  expressed she is interested in starting isotretinoin but has concerns with potential side effects as she tends to have difficulties tolerating medication. She was on isotretinoin for a short duration (1-2 months) around 2009, but stopped due to side effects. She would like to start on lowest possible dose & titrate up to ensure she tolerates.     Some recent flares. Acneform papules & pustules on cheek, chest, neck, shoulder & upper back.      Specialist: Dr. Timmy Franco, Dermatology. The follow was recommend at Derm follow up on 3/28/24:   - Continue minocycline & topical therapies   - Plan to start isotretinoin for acne/rosacea and will see how HS responds. She is aware that typically HS does not respond as well to isotretinoin compared to acne/rosacea  - Will start  prednisone burst at that time and stop minocycline    Monitoring: Enrolled in iPLEDGE # [id number]  Pregnancy test/screening: Patient who can become pregnant   - Pregnancy screening negative (last checked 3/28/24) ; needs to repeat 30 days later on  5/2/24 (earliest patient can start isotretinoin)   - Abstinence while on isotretinoin   Patient agrees to the following: REMS program requirements, not donate blood while on isotretinoin, not share or redistribute isotretinoin.     Baseline labs checked 11/2/24. Due for recheck ~1-2 month after starting isotretinoin.   Triglycerides: 70 mg/dL (normal)   AST: 24 U/L (normal)   ALT:  24 U/L (normal)      Cumulative dose (based on outside fill history) = 0 mg   - 30 days x 10 mg BID: 600 mg (not yet filled)      Goal total cumulative dose (120-150mg/kg) = 7800 mg to 9750 mg     Patient reported home weight: ~65 kg (143 lbs)      Weight based dosing (based on patient weight of 65 kg)  40 mg BID= 1.23 mg/kg/day  30 mg BID= 0.92 mg/kg/day   20 mg BID=0.62 mg/kg/day   10 mg BID=0.31 mg/kg/day     Supplements:   - Ferrous gluconate 324 mg 2-3 times weekly (use limited by GI side effects)   - Vitamin D3 5000  units every 2-3 days   - Cod liver oil on days when not taking Vitamin D   - Collagen 1-2 tablets daily   No reported issues at this time.   Patient is not interested in stopping/changing supplements.     Reports she was not taking vitamin D supplement at time of last labs. Would be interested in rechecking levels in future.     Patient would like iron rechecked in future to see if supplement has stabilized levels.       Allergies/ADRs: Reviewed in chart  Past Medical History: Reviewed in chart  Tobacco: She reports that she has never smoked. She has never used smokeless tobacco.  ----------------    I spent 54 minutes with this patient today. All changes were made via collaborative practice agreement with Dr. Timmy Franco. A copy of the visit note was provided to the patient's provider(s).    A summary of these recommendations was sent via [employer].    Sarita Zazueta, PharmD  Medication Therapy Management Pharmacist   [facility] Dermat[facility]    Telemedicine Visit Details  Type of service:  Telephone visit  Start Time:  9:00am  End Time:  9:55am     Medication Therapy Recommendations  No medication therapy recommendations to display

## 2024-04-05 NOTE — PATIENT INSTRUCTIONS
"Recommendations from today's MTM visit:                                                      I sent a request to our Central prior authorization team to check on your insurance coverage for isotretinoin     Scheduled for lab appointment for repeat pregnancy urine screening on 5/2/24 at 10:15 am at the [facility]     Pending negative pregnancy test on 5/2/24 & after MTM follow-up will plan for the following:   Start isotretinoin 10 mg twice daily at next follow-up   Discontinue minocycline. Start prednisone burst.     Future labs: check Vitamin D levels & Iron with next blood draw. Due for TG, ALT, & AST around 1-2 months after starting isotretinoin    Follow-up:   - MTM appointment: with me (Sarita Zazueta RPh) by phone on Friday May 4th at 10:00 am   - MTM appointment: with me (Sarita Zazueta RPh) by phone in around 8 weeks (6/4/24).  - Dermatology appointment: with Dr. Franco on 7/11/24     It was great speaking with you today.  I value your experience and would be very thankful for your time in providing feedback in our clinic survey. In the next few days, you may receive an email or text message from [facility] Information with a link to a survey related to your  clinical pharmacist.\"     To schedule another MTM appointment, please call the clinic directly or you may call the MTM scheduling line at [phone] or toll-free at [phone].     My Clinical Pharmacist's contact information:                                                      Please feel free to contact me with any questions or concerns you have.      Sarita Zazueta, PharmWAYNE  MTM Pharmacist  [facility] Dermatology   "

## 2024-04-05 NOTE — TELEPHONE ENCOUNTER
Prior Authorization Retail Medication Request    Medication/Dose: Isotretinoin 10 mg twice daily & all subsequent doses (will titrate up to 30 mg twice daily)   Diagnosis and ICD code (if different than what is on RX):  Acne Vulgaris (L70.0)     New PA Request -   Notes: Medication has not been ordered yet due to iPLEDGE safety requirements.   Please run a test claim for isotretinoin & all available generics/brands such as Accutane, Claravis, Amnsteem, Ascorica.     Insurance   Primary: [facility]   Insurance ID:  [id number]     Pharmacy Information   Name:  [facility] in [place], [place]   Phone:  [phone]   Fax:[phone]     Please route back to Sarita Zazueta RPh

## 2024-04-05 NOTE — Clinical Note
4/5/2024       RE: Sophia De Leon  [address] [address]     Dear Colleague,    Thank you for referring your patient, Sophia De Leon, to the [facility] [place] at [facility]. Please see a copy of my visit note below.    Medication Therapy Management (MTM) Encounter    ASSESSMENT:                            Medication Adherence/Access:   *** coverage - {PA status (Optional):231237}    Hidradenitis suppurativa:   Improving, patient would benefit from ***.   Flaring, patient would benefit from ***.  Provided education on *** today including dosing, administration, side effects, precautions, monitoring, and time to efficacy.   Discussed avoiding live vaccines and encouraged receiving the following non-live vaccines: *** Covid-19 vaccine (2023-24), annual influenza, Shingrix, Prevnar 20, Tetanus booster, and avoidance of live vaccines.   Pre-biologic labs up-to-date / need to be completed ***.     Pain:   ***    Supplements:   ***    PLAN:                            ***    Discontinue minocycline when starting isotretinoin. Start short prednisone burst at that time     Add lab for vitamin D levels     Plan to start with isotretinoin 10 mg twice daily - will order today to see if we need a prior authorization.     Follow-up:   - MTM appointment: with me (Sarita Zazueta [facility]) in around *** weeks (***)   - Dermatology appointment: with  *** in around *** weeks (***)     SUBJECTIVE/OBJECTIVE:                          Sophia De Leon is a 38 year old female called for an initial visit. She was referred to me from Dr. Timmy Franco MD.      Reason for visit: isotretinoin management.    Medication Adherence/Access:   *** coverage:   - {PA status (Optional):107173}    Nodulocystic acne/Rosacea/Hidradenitis suppurativa:   - clobetasol 0.05% ointment as needed to groin (HS)   - isotretinoin (***) *** mg *** (start date: ***)  - minocycline  100 mg twice daily (will stop when starting accutane)  - prednisone burst (will take when minocycline is stopped)    - spironolactone 5% topical nightly (compounded, not yet started)   - tretinoin 0.025% cream nightly (alternates with azelaic acid)   - azelaic acid 15% gel nightly (alternates with tretinoin)   - clindamycin 1% lotion as needed for HS & acne   - benzoyl peroxide 5% wash daily as needed   Side effects: ***.    Symptoms/Response: ***  She would like to start on the lowest possible dose of isotretinoin possible due to concerns with side effects.     Patient reports {HS Symptoms:589703:x}.   Affected areas include {HS Affected Areas:920526}.  Beltran Stage: ***    Specialist: Dr. Timmy Franco, Dermatology. The follow was recommend at Derm follow up on 3/28/24:   - Continue minocycline & topical therapies   - Plan to start isotretinoin for acne/rosacea and will see how HS responds. She is aware that typically HS does not respond as well to isotretinoin compared to acne/rosacea  - Will start  prednisone burst at that time and stop minocycline    Monitoring: Enrolled in iPLEDGE # [id number]  Pregnancy test/screening: Patient who can become pregnant   - Pregnancy screening negative (last checked 3/28/24) ; needs to repeat 30 days later on  5/2/24 (earliest patient can start isotretinoin)   - Abstinence while on isotretinoin   Patient agrees to the following: REMS program requirements, not donate blood while on isotretinoin, not share or redistribute isotretinoin.     Triglycerides: Baseline checked 11/2/23. Due for recheck ***.   AST/ALT:  Baseline checked 11/2/23. Due for recheck ***.   Liver function panel: Last checked ***. Due for recheck ***.      Cumulative dose (based on outside fill history) = 0 mg   - *** days x *** mg BID or QD: *** mg (filled ***)     Reports she was on isotretinoin in the past around 2009 for 1-2 months.      Goal total cumulative dose (120-150mg/kg) = 7800 mg to 9750 mg   143  lbs = 65 kg   Wt Readings from Last 5 Encounters:   11/27/23 62.6 kg (138 lb)       Package Insert Info: Claravis (isotretinoin) - 10, 20, 30, and 40 mg capsules  The recommended dosage range for Claravis is 0.5 to 1 mg/kg/day given in two divided doses with food for 15 to 20 weeks. In studies comparing 0.1, 0.5, and 1 mg/kg/day, it was found that all dosages provided initial clearing of disease, but there was a greater need for retreatment with the lower dosages. During treatment, the dose may be adjusted according to response of the disease and/or the appearance of clinical side effects - some of which may be dose related. Adult patients whose disease is very severe with scarring or is primarily manifested on the trunk may require dose adjustments up to 2 mg/kg/day, as tolerated. Failure to take Claravis with food will significantly decrease absorption.       Additional Notes (can delete or add to assessment):   Per UpToDate & Package Insert:   Initial: 0.5 mg/kg/day in 2 divided doses for 4 weeks, then increase dose to 1 mg/kg/day in 2 divided doses. For severe extensive cases (involving trunk, nuchal region, lower back, buttocks, thighs) may require higher doses up to 2 mg/kg/day in 2 divided doses.  Duration of therapy: Typically 15 to 20 weeks depending upon daily dose; a target cumulative dose range of 120 to 150 mg/kg has been recommended based on observed lower relapse rates with courses ?120 mg/kg and the plateau effect observed with higher cumulative total doses of >150 mg/kg.   Other dosing:   Amnesteem(R), Claravis(TM), Myorisan(R), Zenatane(TM): 0.5 to 1 mg/kg/day orally in 2 divided doses with food for 15 to 20 weeks; severe scarring acne or trunk acne may require increase to 2 mg/kg/day   Absorica(R): 0.5 to 1 mg/kg/day orally in 2 divided doses with or without meals for 15 to 20 weeks; severe scarring acne or trunk acne may require increase to 2 mg/kg/day  Absorica LD(TM): 0.4 to 0.8 mg/kg/day  "orally in 2 divided doses with or without meals for 15 to 20 weeks; severe scarring acne or trunk acne may require increase to 1.6 mg/kg/day  Absorica(R) or Absorica LD(TM): If a dose is missed, just skip that dose. Do not take two doses at the same time  Standard or lidose formulation: 0.5 mg/kg/day in 2 divided doses for 1 month, then increase to 1 mg/kg/day in 2 divided doses as tolerated; alternatively, may administer the total daily dose once daily to increase adherence to therapy   For patients with severe inflammatory acne (eg, acne conglobata) or deep comedonal acne, consider initial doses <0.5 mg/kg/day (eg, 0.1 mg/kg/day) in combination with an oral glucocorticoid given before or at isotretinoin initiation and continued for the first 2 to 4 weeks of therapy to reduce the risk of severe acne flares   Continue until a total cumulative dose of 120 to 150 mg/kg is reached  Micronized formulation: 0.4 to 0.8 mg/kg/day in 2 divided doses.   Adults with very severe disease/scarring or primarily involving the trunk may require dosage adjustment up to 1.6 mg/kg/day in divided doses, as tolerated.   Continue therapy until nodule count has been reduced by at least 70% or for up to 15 to 20 weeks.  Alternative low-dose regimen for moderate acne (off-label use):   Standard or lidose formulation: 0.3 to 0.5 mg/kg once daily  Continue until a total cumulative dose of 120 to 150 mg/kg is reached      Weight based dosing (based on *** patient weight of *** kg)  40 mg BID= *** mg/kg/day  40 mg QD= *** mg/kg/day  30 mg BID= *** mg/kg/day     Pain:   {OTC Pain Meds:142661}  Side effects: ***     Patient reports pain symptoms are ***.     Supplements:   {Supplements:731180}    {supplement:479037}    No results found for: \"JACQUI\", \"IRON\", \"FEB\"    No results found for: \"VITDT\"    Allergies/ADRs: Reviewed in chart  Past Medical History: Reviewed in chart  Tobacco: She reports that she has never smoked. She has never used smokeless " "tobacco.  ----------------  {CYN?:332775}  I spent {mt total time 3:706369} with this patient today. All changes were made via collaborative practice agreement with Dr. Timmy Franco. A copy of the visit note was provided to the patient's provider(s).    A summary of these recommendations {GIVEN/NOT GIVEN:920793}.    ***    Telemedicine Visit Details  Type of service:  {telemedvisitmtm:291570::\"Telephone visit\"}  Start Time: {video/phone visit start time:152948}  End Time: {video/phone visit end time:152948}     Medication Therapy Recommendations  No medication therapy recommendations to display       Again, thank you for allowing me to participate in the care of your patient.      Sincerely,    Sarita Zazueta [facility]    "

## 2024-04-18 NOTE — TELEPHONE ENCOUNTER
PA Initiation    Medication: ISOTRETINOIN 10 MG PO CAPS  Insurance Company: [employer] - Phone [phone] Fax [phone]  Pharmacy Filling the Rx: [id number] - [place] - [address]  Filling Pharmacy Phone: [phone]  Filling Pharmacy Fax:    Start Date: 4/18/2024  Retail Pharmacy Prior Authorization Team   Phone: [phone]

## 2024-04-19 NOTE — TELEPHONE ENCOUNTER
Prior Authorization Not Needed per Insurance    Medication: ISOTRETINOIN 10 MG PO CAPS  Insurance Company: [employer] - Phone [phone] Fax [phone]  Expected CoPay: $    Pharmacy Filling the Rx: [id number] - [place] - [address] [facility]  Pharmacy Notified: Yes  Patient Notified: Pharmacy will notify patient.

## 2024-04-19 NOTE — TELEPHONE ENCOUNTER
Reached out to pt to see if still needing an appointment with Dr Shepard. Pt was thinking Dr Shepard was managing her accutane. Writer let her know this will be done through Sarita and Dr Franco. Appts confirmed.     Pt states she has a wart on heel that needs to be treated. Offered to schedule with any provider. Pt declinied stating she has derm clinic near. If they cannot tx, will contact clinic back.

## 2024-04-19 NOTE — TELEPHONE ENCOUNTER
----- Message from Timmy Franco MD sent at 4/17/2024  2:25 PM CDT -----  Regarding: RE: See below  I dont think she can get in withDanielle. Lets do our typical protocol and have her follow-up with Danielle when she next available.  Timmy  ----- Message -----  From: Sarita Zazueta [facility]  Sent: 4/17/2024  11:03 AM CDT  To: Timmy Franco MD; Neena Parson RN; #  Subject: RE: See below                                    She has not started the the Accutane yet because she still needs to complete the 2nd 30-day repeat pregnancy test on 5/2/24 which we scheduled.     I'm following up with her the on 5/3/24 & pending negative pregnancy test, then I will actually prescribe the Accutane at that time & clear her to get started in iPLEDGE system.     I was planning for the usual 4 wk isotretinoin follow-up plan with me seeing the patient on Month 1 & 2, then Dr. Franco sees them on month 3, then repeat this cycle. I am scheduled with her in May & was planning on June as well, then she'd see Dr. Franco as scheduled on 7/11/24.     Was then intention for this patient this patient to complete the 4 week follow-up with Dr. Shepard vs [facility]?    Let me know - thanks!    Sarita  ----- Message -----  From: Timmy Franco MD  Sent: 4/11/2024  10:31 AM CDT  To: Neena Parson RN; Sarita Zazueta [facility]; #  Subject: See below                                        Danielle, were able to get her scheduled. If not lets have her see Sarita.   Timmy  ----- Message -----  From: Neena Parson RN  Sent: 4/5/2024  11:52 AM CDT  To: Timmy Franco MD; Sarita Zazueta RPH    Hi,    This pt was started on accutane last week and will need to have a pregnancy test again May 2nd. Looks like she should see Dr Franco in 12 weeks, Dr Shepard in 4..    Who will be following her accutane visits?

## 2024-05-02 ENCOUNTER — LAB (OUTPATIENT)
Dept: LAB | Facility: CLINIC | Age: 39
End: 2024-05-02
Payer: COMMERCIAL

## 2024-05-02 DIAGNOSIS — Z51.81 MEDICATION MONITORING ENCOUNTER: ICD-10-CM

## 2024-05-02 LAB
RETICS # AUTO: 0.04 10E6/UL
RETICS/RBC NFR AUTO: 1 %

## 2024-05-02 PROCEDURE — 36415 COLL VENOUS BLD VENIPUNCTURE: CPT

## 2024-05-02 PROCEDURE — 85045 AUTOMATED RETICULOCYTE COUNT: CPT

## 2024-05-02 PROCEDURE — 84702 CHORIONIC GONADOTROPIN TEST: CPT

## 2024-05-03 ENCOUNTER — VIRTUAL VISIT (OUTPATIENT)
Dept: RHEUMATOLOGY | Facility: CLINIC | Age: 39
End: 2024-05-03
Attending: DERMATOLOGY
Payer: COMMERCIAL

## 2024-05-03 DIAGNOSIS — L70.0 ACNE VULGARIS: Primary | ICD-10-CM

## 2024-05-03 DIAGNOSIS — L73.2 HIDRADENITIS SUPPURATIVA: ICD-10-CM

## 2024-05-03 DIAGNOSIS — L71.9 ROSACEA: ICD-10-CM

## 2024-05-03 DIAGNOSIS — E55.9 VITAMIN D DEFICIENCY: ICD-10-CM

## 2024-05-03 DIAGNOSIS — E61.1 IRON DEFICIENCY: ICD-10-CM

## 2024-05-03 LAB — HCG INTACT+B SERPL-ACNC: <1 MIU/ML

## 2024-05-03 RX ORDER — ISOTRETINOIN 10 MG/1
10 CAPSULE ORAL 2 TIMES DAILY
Qty: 60 CAPSULE | Refills: 0 | Status: SHIPPED | OUTPATIENT
Start: 2024-05-03 | End: 2024-06-03

## 2024-05-03 NOTE — Clinical Note
FYI - she started Isotretinoin. iPLEDGE requirements are met & she'll recheck pregnancy labs monthly. I'm seeing her next for 4 wk follow-up on 5/31 & you'll see her on 7/11/24.   Sarita

## 2024-05-03 NOTE — PROGRESS NOTES
Medication Therapy Management (MTM) Encounter    ASSESSMENT:                            Medication Adherence/Access:   Isotretinoin coverage -  No PA required by insurance, this is covered    Acne/Rosacea/Hidradenitis suppurativa:   Flaring, dermatology recommended initiation of isotretinoin to see if this helps with both acne & HS. Reviewed instructions to discontinue minocycline & start short prednisone taper when starting isotretinoin. Patients repeat pregnancy test from 5/2/24 was negative, she can now safely start isotretinoin per iPLEDGE requirements. Patient agreed to all iPLEDGE requirements including monthly pregnancy tests & abstinence while on treatment. Given patient preference for lowest possible starting dose, will start isotretinoin 10 mg twice daily (0.31 mg/kg/day) and titrate as tolerated to a target dose of ~1 mg/kg/day. Goal total cumulative dose is 120-150mg/kg (7800 mg to 9750 mg).  Baseline labs completed. Recheck TG, ALT & AST ~1-2 months after starting & until dose is stabilized.     Supplements:   Continues on Vitamin D & iron supplement. Will recheck Vitamin D & iron labs at future lab appointment.     PLAN:                            Updated provider side of iPLEDGE. Patient needs to complete the safety questions     Start isotretinoin 10 mg twice daily. Prescription sent to [facility]    Discontinue minocycline the day before isotretinoin is started.     Start prednisone taper (sent dose instructions in [email or web address] message)     Lab appointment on 5/30/24 at 10:15 am at the [facility]   Pregnancy screening plus vitamin D, iron & ferritin labs as requested     Future: Recheck TG, ALT, & AST around 1-2 months after starting isotretinoin    Follow-up:   - MTM appointment: with me (Sarita Zazueta [facility]) by phone on Friday May 31st at 12:30 pm   - Dermatology appointment: with Dr. Franco on 7/11/24     SUBJECTIVE/OBJECTIVE:                          Sophia De Leon is a 38 year old  female called for an initial visit. She was referred to me from Dr. Timmy Franco MD.      Reason for visit: isotretinoin management.    Medication Adherence/Access:   Isotretinoin coverage:   -  Covered. No PA required     Acne/Rosacea/Hidradenitis suppurativa:   - isotretinoin 10 mg twice daily (estimated start date: ~24)  - clobetasol 0.05% ointment as needed to groin (HS)   - minocycline 100 mg twice daily (stop when starting isotretinoin)  - prednisone burst (will start short course when minocycline is stopped)    - spironolactone 5% topical nightly (compounded)   - tretinoin 0.025% cream every other night (alternates with azelaic acid)   - azelaic acid 15% gel every other night   - clindamycin 1% lotion as needed for HS & acne   - benzoyl peroxide 5% wash daily as needed   Side effects: None at this time.    Patient plans to start isotretinoin. Repeat 2nd pregnancy screening was completed yesterday (24). She was on isotretinoin for a short duration (1-2 months) around , but stopped due to side effects, for this reason she would like to start on the lowest possible dose & titrate up to ensure she tolerates.     Some recent flares - acneform papules & pustules on cheek, chest, neck, shoulder & upper back.      Specialist: Dr. Timmy Franco, Dermatology. The follow was recommend at Derm follow up on 3/28/24:   - Plan to start isotretinoin for acne/rosacea and will see how HS responds.   - Will start  prednisone burst at that time and stop minocycline    Monitoring: Enrolled in iPLEDGE # [id number]  Pregnancy test/screening: Patient who can become pregnant   - Pregnancy screeninst checked 3/28/24 (negative) ; Repeat test on 24 (negative)     - Patient will continue with monthly pregnancy screening while on isotretinoin   - Patient agrees to abstinence from vaginal-penile intercourse while on isotretinoin   - Patient agrees to the following:   - REMS program requirements  - Not to donate blood  while on isotretinoin  - Not to share or redistribute isotretinoin.     Baseline labs checked 11/2/24. Due for recheck ~1-2 month after starting isotretinoin.   - Triglycerides: 70 mg/dL (normal)   - AST: 24 U/L (normal)   - ALT:  24 U/L (normal)      Cumulative dose (based on outside fill history) = 0 mg   - 30 days x 10 mg BID: 600 mg (not yet filled)      Goal total cumulative dose (120-150mg/kg) = 7800 mg to 9750 mg     Patient reported home weight: ~65 kg (143 lbs)      Weight based dosing (based on patient weight of 65 kg)  40 mg BID= 1.23 mg/kg/day  30 mg BID= 0.92 mg/kg/day   20 mg BID=0.62 mg/kg/day   10 mg BID=0.31 mg/kg/day     Supplements:   - Ferrous gluconate 324 mg 2-3 times weekly (use limited by GI side effects)   - Vitamin D3 5000 units every 2-3 days   - Cod liver oil on days when not taking Vitamin D   - Collagen 1-2 tablets daily   No reported issues at this time.   Patient is not interested in stopping/changing supplements at this time, but would like her iron & Vitamin D levels to be rechecked with next labs.             Allergies/ADRs: Reviewed in chart  Past Medical History: Reviewed in chart  Tobacco: She reports that she has never smoked. She has never used smokeless tobacco.  ----------------    I spent 27 minutes with this patient today. All changes were made via collaborative practice agreement with Timmy Franco. A copy of the visit note was provided to the patient's provider(s).    A summary of these recommendations was sent via [employer].    Sarita Zazueta, PharmD  Medication Therapy Management Pharmacist   [facility]    Telemedicine Visit Details  Type of service:  Telephone visit  Start Time:  10:00am  End Time:  10:27am     Medication Therapy Recommendations  No medication therapy recommendations to display

## 2024-05-03 NOTE — Clinical Note
5/3/2024       RE: Sophia De Leon  [address] [address]     Dear Colleague,    Thank you for referring your patient, Sophia De Leon, to the [facility] [place] at [facility]. Please see a copy of my visit note below.    Medication Therapy Management (MTM) Encounter    ASSESSMENT:                            Medication Adherence/Access:   Isotretinoin coverage -  Needs PA initiated to confirm coverage    Acne/Rosacea/Hidradenitis suppurativa:   Fl[place], Dermatology recommended initiation of isotretinoin to see if this helps with both acne & HS. Will discontinue minocycline & start short prednisone burst when starting isotretinoin. Reviewed iPLEDGE requirements & plan for 4 week follow-up while on isotretinoin. Patient agreed to this & iPLEDGE requirements. Patient agrees to monthly pregnancy tests & abstinence while on treatment. Provided education on isotretinoin today including dosing, administration, side effects (dryness, joint pain, headaches, changes in mood, etc), precautions (teratogenic risks), and lab monitoring. Discussed we typically start with a dose of 20 mg twice daily & titrate up from there. However, patient prefers to start at lowest possible dose due to concerns with side effects. Will plan to start isotretinoin 10 mg twice daily (0.31 mg/kg/day). Goal total cumulative dose is 120-150mg/kg (7800 mg to 9750 mg).  Baseline labs completed. Will recheck TG, ALT & AST around 1-2 months after starting & until dose is stabilized. Patient will need to complete repeat 30-day pregnancy screening after 5/2/24 for iPLEDGE prior to starting. Scheduled this lab today.     Per UpToDate & Package Insert:   Initial: 0.5 mg/kg/day in 2 divided doses for 4 weeks, then increase dose to 1 mg/kg/day in 2 divided doses. For severe extensive cases (involving trunk, nuchal region, lower back, buttocks, thighs) may require higher doses up  to 2 mg/kg/day in 2 divided doses.  Duration of therapy: Typically 15 to 20 weeks depending upon daily dose; a target cumulative dose range of 120 to 150 mg/kg has been recommended based on observed lower relapse rates with courses ?120 mg/kg and the plateau effect observed with higher cumulative total doses of >150 mg/kg.     Supplements:   Continues on Vitamin D & iron supplement. Will recheck Vitamin D & iron labs at future lab appointment.     PLAN:                            Updated provider side of iPLEDGE. Patient needs to complete the safety questions   Sent prescription for isotretinoin to [facility]. Start isotretinoin 10 mg twice daily   Discontinue minocycline the day before isotretinoin is started.   Start prednisone taper (sent dose instructions in [employer] message)   Lab appointment for monthly pregnancy test on 5/30/24 at 10:15 am at the [facility]   Added vitamin D levels, iron & ferritin labs as requested by patient   Due for TG, ALT, & AST around 1-2 months after starting isotretinoin    Follow-up:   - MTM appointment: with me (Sarita Zazueta [facility]) by phone on Friday May 31st at 12:30 pm   - Dermatology appointment: with Dr. Franco on 7/11/24     SUBJECTIVE/OBJECTIVE:                          Sophia De Leon is a 38 year old female called for an initial visit. She was referred to me from Dr. Timmy Franco MD.      Reason for visit: isotretinoin management.    Medication Adherence/Access:   Isotretinoin coverage:   -  Will order now to confirm to request PA     Acne/Rosacea/Hidradenitis suppurativa:   - isotretinoin 10 mg twice daily (estimated start date: ~5/4/24)  - clobetasol 0.05% ointment as needed to groin (HS)   - minocycline 100 mg twice daily (stop when starting isotretinoin)  - prednisone burst (will start short course when minocycline is stopped)    - spironolactone 5% topical nightly (compounded, not yet started)   - tretinoin 0.025% cream nightly (alternates with azelaic  acid)   - azelaic acid 15% gel nightly (alternates with tretinoin)   - clindamycin 1% lotion as needed for HS & acne   - benzoyl peroxide 5% wash daily as needed   Side effects: None at this time.    Patient expressed she is interested in starting isotretinoin but has concerns with potential side effects as she tends to have difficulties tolerating medication. She was on isotretinoin for a short duration (1-2 months) around 2009, but stopped due to side effects. She would like to start on lowest possible dose & titrate up to ensure she tolerates.     Some recent flares. Acneform papules & pustules on cheek, chest, neck, shoulder & upper back.      Specialist: Dr. Timmy Franco, Dermatology. The follow was recommend at Derm follow up on 3/28/24:   - Continue minocycline & topical therapies   - Plan to start isotretinoin for acne/rosacea and will see how HS responds. She is aware that typically HS does not respond as well to isotretinoin compared to acne/rosacea  - Will start  prednisone burst at that time and stop minocycline    Monitoring: Enrolled in iPLEDGE # [id number]  Pregnancy test/screening: Patient who can become pregnant   - Pregnancy screening negative (last checked 3/28/24) ; needs to repeat 30 days later on  5/2/24 (earliest patient can start isotretinoin)   - Abstinence while on isotretinoin   Patient agrees to the following: REMS program requirements, not donate blood while on isotretinoin, not share or redistribute isotretinoin.     Baseline labs checked 11/2/24. Due for recheck ~1-2 month after starting isotretinoin.   Triglycerides: 70 mg/dL (normal)   AST: 24 U/L (normal)   ALT:  24 U/L (normal)      Cumulative dose (based on outside fill history) = 0 mg   - 30 days x 10 mg BID: 600 mg (not yet filled)      Goal total cumulative dose (120-150mg/kg) = 7800 mg to 9750 mg     Patient reported home weight: ~65 kg (143 lbs)      Weight based dosing (based on patient weight of 65 kg)  40 mg BID= 1.23  "mg/kg/day  30 mg BID= 0.92 mg/kg/day   20 mg BID=0.62 mg/kg/day   10 mg BID=0.31 mg/kg/day     Supplements:   - Ferrous gluconate 324 mg 2-3 times weekly (use limited by GI side effects)   - Vitamin D3 5000 units every 2-3 days   - Cod liver oil on days when not taking Vitamin D   - Collagen 1-2 tablets daily   No reported issues at this time.   Patient is not interested in stopping/changing supplements.     Reports she was not taking vitamin D supplement at time of last labs. Would be interested in rechecking levels in future.     Patient would like iron rechecked in future to see if supplement has stabilized levels.       Allergies/ADRs: Reviewed in chart  Past Medical History: Reviewed in chart  Tobacco: She reports that she has never smoked. She has never used smokeless tobacco.  ----------------  {CYN?:385514}  I spent 27 minutes with this patient today. { :257023}. A copy of the visit note was provided to the patient's provider(s).    A summary of these recommendations {GIVEN/NOT GIVEN:544309}.    ***    Telemedicine Visit Details  Type of service:  {telemedvisitmtm:663191::\"Telephone visit\"}  Start Time: {video/phone visit start time:152948}  End Time: {video/phone visit end time:152948}     Medication Therapy Recommendations  No medication therapy recommendations to display       Medication Therapy Management (MTM) Encounter    ASSESSMENT:                            Medication Adherence/Access:   Isotretinoin coverage -  No PA required by insurance, this is covered    Acne/Rosacea/Hidradenitis suppurativa:   Fl[place], dermatology recommended initiation of isotretinoin to see if this helps with both acne & HS. Reviewed instructions to discontinue minocycline & start short prednisone taper when starting isotretinoin. Patients repeat pregnancy test from 5/2/24 was negative, she can now safely start isotretinoin per iPLEDGE requirements. Patient agreed to all iPLEDGE requirements including monthly pregnancy tests " & abstinence while on treatment. Given patient preference for lowest possible starting dose, will start isotretinoin 10 mg twice daily (0.31 mg/kg/day) and titrate as tolerated to a target dose of ~1 mg/kg/day. Goal total cumulative dose is 120-150mg/kg (7800 mg to 9750 mg).  Baseline labs completed. Recheck TG, ALT & AST ~1-2 months after starting & until dose is stabilized.     Supplements:   Continues on Vitamin D & iron supplement. Will recheck Vitamin D & iron labs at future lab appointment.     PLAN:                            Updated provider side of iPLEDGE. Patient needs to complete the safety questions     Sent prescription for isotretinoin to [facility]. Start isotretinoin 10 mg twice daily     Discontinue minocycline the day before isotretinoin is started.     Start prednisone taper (sent dose instructions in [employer] message)     Lab appointment for monthly pregnancy test on 5/30/24 at 10:15 am at the [facility]   Added vitamin D levels, iron & ferritin labs as requested by patient     Future: Recheck TG, ALT, & AST around 1-2 months after starting isotretinoin    Follow-up:   - MTM appointment: with me (Sarita Zazueta [facility]) by phone on Friday May 31st at 12:30 pm   - Dermatology appointment: with Dr. Franco on 7/11/24     SUBJECTIVE/OBJECTIVE:                          Sophia De Leon is a 38 year old female called for an initial visit. She was referred to me from Dr. Timmy Franco MD.      Reason for visit: isotretinoin management.    Medication Adherence/Access:   Isotretinoin coverage:   -  Covered. No PA required     Acne/Rosacea/Hidradenitis suppurativa:   - isotretinoin 10 mg twice daily (estimated start date: ~5/4/24)  - clobetasol 0.05% ointment as needed to groin (HS)   - minocycline 100 mg twice daily (stop when starting isotretinoin)  - prednisone burst (will start short course when minocycline is stopped)    - spironolactone 5% topical nightly (compounded)   - tretinoin 0.025%  cream every other night (alternates with azelaic acid)   - azelaic acid 15% gel every other night   - clindamycin 1% lotion as needed for HS & acne   - benzoyl peroxide 5% wash daily as needed   Side effects: None at this time.    Patient plans to start isotretinoin. Repeat 2nd pregnancy screening was completed yesterday (24). She was on isotretinoin for a short duration (1-2 months) around , but stopped due to side effects, for this reason she would like to start on the lowest possible dose & titrate up to ensure she tolerates.     Some recent flares - acneform papules & pustules on cheek, chest, neck, shoulder & upper back.      Specialist: Dr. Timmy Franco, Dermatology. The follow was recommend at Derm follow up on 3/28/24:   - Plan to start isotretinoin for acne/rosacea and will see how HS responds.   - Will start  prednisone burst at that time and stop minocycline    Monitoring: Enrolled in iPLEDGE # [id number]  Pregnancy test/screening: Patient who can become pregnant   - Pregnancy screeninst checked 3/28/24 (negative) ; Repeat test on 24 (negative)     - Patient will continue with monthly pregnancy screening while on isotretinoin   - Patient agrees to abstinence from vaginal-penile intercourse while on isotretinoin   - Patient agrees to the following:   - REMS program requirements  - Not to donate blood while on isotretinoin  - Not to share or redistribute isotretinoin.     Baseline labs checked 24. Due for recheck ~1-2 month after starting isotretinoin.   - Triglycerides: 70 mg/dL (normal)   - AST: 24 U/L (normal)   - ALT:  24 U/L (normal)      Cumulative dose (based on outside fill history) = 0 mg   - 30 days x 10 mg BID: 600 mg (not yet filled)      Goal total cumulative dose (120-150mg/kg) = 7800 mg to 9750 mg     Patient reported home weight: ~65 kg (143 lbs)      Weight based dosing (based on patient weight of 65 kg)  40 mg BID= 1.23 mg/kg/day  30 mg BID= 0.92 mg/kg/day   20 mg  BID=0.62 mg/kg/day   10 mg BID=0.31 mg/kg/day     Supplements:   - Ferrous gluconate 324 mg 2-3 times weekly (use limited by GI side effects)   - Vitamin D3 5000 units every 2-3 days   - Cod liver oil on days when not taking Vitamin D   - Collagen 1-2 tablets daily   No reported issues at this time.   Patient is not interested in stopping/changing supplements at this time, but would like her iron & Vitamin D levels to be rechecked with next labs.             Allergies/ADRs: Reviewed in chart  Past Medical History: Reviewed in chart  Tobacco: She reports that she has never smoked. She has never used smokeless tobacco.  ----------------    I spent 27 minutes with this patient today. All changes were made via collaborative practice agreement with Timmy Franco. A copy of the visit note was provided to the patient's provider(s).    A summary of these recommendations was sent via [employer].    Sarita Zazueta PharmD  Medication Therapy Management Pharmacist   [facility]at[facility]    Telemedicine Visit Details  Type of service:  Telephone visit  Start Time:  10:00am  End Time:  10:27am     Medication Therapy Recommendations  No medication therapy recommendations to display         Again, thank you for allowing me to participate in the care of your patient.      Sincerely,    Sarita Zazueta RPH

## 2024-05-08 NOTE — PATIENT INSTRUCTIONS
"Recommendations from today's MTM visit:                                                      Updated provider side of iPLEDGE. Patient needs to complete the safety questions     Sent prescription for isotretinoin to [facility]. Start isotretinoin 10 mg twice daily     Discontinue minocycline the day before isotretinoin is started.     Start prednisone taper (sent dose instructions in MyChart message)     Lab appointment for monthly pregnancy test [facility]24 [facility]   Added vitamin D levels, iron & ferritin labs as requested    Future: Recheck TG, ALT, & AST around 1-2 months after starting isotretinoin    Follow-up:   - MTM appointment: with me (Sarita Zazueta [facility]) by phone on Friday May 31st at 12:30 pm   - Dermatology appointment: with Dr. Franco on 7/11/24     It was great speaking with you today.  I value your experience and would be very thankful for your time in providing feedback in our clinic survey. In the next few days, you may receive an email or text message from [employer] with a link to a survey related to your  clinical pharmacist.\"     To schedule another MTM appointment, please call the clinic directly or you may call the MTM scheduling line at [phone] or toll-free at [phone].     My Clinical Pharmacist's contact information:                                                      Please feel free to contact me with any questions or concerns you have.      Sarita Zazueta, PharmD  MTM Pharmacist  [facility]ermat[facility]   "

## 2024-05-14 NOTE — RESULT ENCOUNTER NOTE
Add on photos and phone this week or Monday for accutane. Make sure her forms have been signs. I bleive Dr. Franco got them. See if she is in the accutane system. Thanks

## 2024-05-15 ENCOUNTER — TELEPHONE (OUTPATIENT)
Dept: DERMATOLOGY | Facility: CLINIC | Age: 39
End: 2024-05-15
Payer: COMMERCIAL

## 2024-05-15 ENCOUNTER — MYC MEDICAL ADVICE (OUTPATIENT)
Dept: DERMATOLOGY | Facility: CLINIC | Age: 39
End: 2024-05-15
Payer: COMMERCIAL

## 2024-05-15 NOTE — TELEPHONE ENCOUNTER
Forms are signed and in chart. Pt is registered in ipledge. Pt scheduled for 5/20 for a telephone visit with Devyn, sent [employer] message to confirm appointment date/time.    Angelia Abbott RN on 5/15/2024 at 2:28 PM      HCG quantitative pregnancy  Order: [id number]  Status: Final result       Visible to patient: Yes (seen)       Dx: Medication monitoring encounter    1 Result Note      Component  Ref Range & Units 13 d ago 6 mo ago  hCG Quantitative  <5 mIU/mL <1 <1 CM  Comment: Adult: 0-5 mIU/mL for healthy non-pregnant person  Neonates: Should be within normal ranges by 2 days after birth  Resulting Agency [facility]          Specimen Collected: 05/02/24 10:27 AM Last Resulted: 05/03/24  3:39 PM        Result Notes     Danielle Shepard MD  5/14/2024  4:41 PM CDT Back to Top    Add on photos and phone this week or Monday for accutane. Make sure her forms have been signs. I bleive Dr. Franco got them. See if she is in the accutane system. Thanks

## 2024-05-17 NOTE — PROGRESS NOTES
[facility] Dermatology Note  Encounter Date: May 20, 2024  Store-and-Forward and Telephone ([phone]). Location of teledermatologist: [facility].  Start time: 1:08pm. End time: 1:22pm.    Dermatology Problem List:  1. Nodulocystic acne  -flare of R forehead 08/25/22 in dermatomal distribution ddx zoster vs Pityrosporum folliculitis vs pyoderma faciale.  -s/p valtrex without improvement  -bacterial culture with normal jonathan  -swabbed for zoster PCR 08/25/22   2. Hidradenitis suppurativa, Beltran stage II  -Current: , benzoyl peroxide 2.5% wash, minocycline 100mg BID  -Past: oral antibiotics (minocycline and rifampin), Humira (migraines), isotretinoin (dryness, headaches and dizziness),  anakinra prescribed (did not start), ustekinumab (did not start)  - ILK did help the inflammatory on cheek  - Prednisone made her worse and made dizziness worse.  - Last quantiferon gold: 2018  - Hep C ab neg 11/3/20  - HIV neg 11/3/20  - Hep B: surface ab +, surface antigen neg c/w immunity 8/12/18  - CBC last wnl 11/3/20  - BMP wnl 12/27/20  3. 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 Hx of Afib after childbirth   5. Wart, left heel  6. Hx of high-risk HPV  7. Seborrheic dermatitis, scalp  - ketoconazole 2% shampoo, lidex 0.05% solution    ____________________________________________    Assessment & Plan:        # acne/rosacea-improved significantly  -continue accutane 20mg daily(after eye check)  - stop topical creams on face.   - Labs reviewed: pending HCG blood.   --The patient was again counseled on the following: The patient cannot give blood while on isotretinoin or share the drug  Reviewed need to avoid alcohol on medication. Eye dryness and bloody nose are possible side effects and the patient should seek care of these if they occur. The patient will call for severe flares and seek care along with stopping the drug for SI or  mood issues.  Reviewed to call for flares. Reviewed photosensitivity and need for sunscreen. Call us if you flaring.   - Labs due today: pending  -The patients is abstinent, reviewed meaning  -pt reports acne on torso, okay to send photo if she would like(Dr. Franco did give her prednisone which she hasn't taken)     # Hidradenitis Suppurativa   - do not use minocycline  - Topical spironolactone is okay for HS  -clobetasol for itchy on groin  -clindamycin is okay for Hs flares      #headaches, has hx of migrains with aura  -eye pressure check, hold drug until given okay from eye      Procedures Performed:    None    Follow-up: 1 month(s) virtually (telephone with photos). DO not refill until the accutane until pregnancy test is negative. 5/30/2024-- these will go to Dr. Franco    Staff and Scribe:   Scribe Disclosure:   I, Lyndsay Colmenares, am serving as a scribe to document services personally performed by Danielle Shepard MD based on data collection and the provider's statements to me.     Provider Disclosure:   The documentation recorded by the scribe accurately reflects the services I personally performed and the decisions made by me.    Danielle Shepard MD    Department of Dermatology  [facility]: Phone: [phone], Fax:[phone]  [facility]: Phone: [phone], Fax: [phone]   ____________________________________________    CC: Accutane (Acne vulgaris. Current treatment: ISOtretinoin (ACCUTANE) 10 MG capsule twice daily. Patient reports headaches and dryness especially to lips and eyes, achy joints. )    HPI:  Ms. Sophia De Leon is a(n) 38 year old female who presents today as a return patient for Accutane start. SHe is doing well    She feels she is overall    Today, patient reports headaches    Has some blurry vision  Patient is otherwise feeling well, without additional skin  concerns.    Labs Reviewed:  Component      Latest Ref Rng 5/2/2024  10:27 AM   % Retic      % 1.0    Absolute Retic      10e6/uL 0.044    hCG Quantitative      <5 mIU/mL <1          Physical Exam:  Vitals: There were no vitals taken for this visit.  SKIN: Teledermatology photos were reviewed; image quality and interpretability:  acceptable. Image date: face.  - acneiform papules and pustules  - No other lesions of concern on areas examined.     Medications:  Current Outpatient Medications   Medication Sig Dispense Refill    Acetaminophen (TYLENOL PO) Take 1,000 mg by mouth as needed for mild pain PRN      azelaic acid (FINACIA) 15 % external gel Apply once daily to the face 50 g 3    benzoyl peroxide 5 % external liquid Apply to affected areas for 20 seconds daily, then rinse. 226 g 11    calcium carbonate 500 MG CHEW Take 1-2 tablets by mouth as needed (heartburn)      cetirizine (ZYRTEC) 10 MG tablet Take 10 mg by mouth daily as needed for allergies      cholecalciferol (VITAMIN D3) 125 mcg (5000 units) capsule Take 250 mcg by mouth every 2nd or 3rd day      clindamycin (CLEOCIN T) 1 % external lotion Apply topically 2 times daily 60 mL 3    clobetasol (TEMOVATE) 0.05 % external ointment Apply to itchy area in groin twice a day as neeeded 60 g 0    clotrimazole (LOTRIMIN) 1 % external cream Apply topically 2 times daily 45 g 3    cod liver oil CAPS capsule Take 1 capsule by mouth daily 460 mg omega 3 fatty acids      COLLAGEN PO Take 1-2 tablets by mouth daily . Contains Calcium + Biotin      diclofenac (ASPERCREME ARTHRITIS PAIN) 1 % topical gel Apply topically as needed (arthritic pain)      ferrous gluconate (FERGON) 324 (38 Fe) MG tablet Take 324 mg by mouth 2-3 times per week. Plus Vitamin C 500 mg chewable when taking.      fluconazole (DIFLUCAN) 150 MG tablet Take 150 mg by mouth once as needed (yeast infection)      fluticasone (FLONASE) 50 MCG/ACT nasal spray Spray 2 sprays in nostril as needed for  rhinitis or allergies      IBUPROFEN PO Take 400 mg by mouth as needed for moderate pain PRN      ISOtretinoin (ACCUTANE) 10 MG capsule Take 1 capsule (10 mg) by mouth 2 times daily iPLEDGE #: [id number]. May substitute for generic or brand (I.e., Claravis) per insurance requirements. 60 capsule 0    ketoconazole (NIZORAL) 2 % external shampoo Apply topically once a week 120 mL 11    meclizine (ANTIVERT) 12.5 MG tablet Take 12.5-25 mg by mouth 3 times daily as needed (vertigo)      ondansetron (ZOFRAN ODT) 4 MG ODT tab Place 4 mg under the tongue as needed for nausea (with migraines)      rizatriptan (MAXALT-MLT) 10 MG ODT Take 10 mg by mouth at onset of headache for migraine      tretinoin (RETIN-A) 0.025 % external cream Use every night 45 g 3    urea (GORMEL) 20 % external cream Apply topically 2 times daily as needed (for feet dryness)       Current Facility-Administered Medications   Medication Dose Route Frequency Provider Last Rate Last Admin    lidocaine 1% with EPINEPHrine 1:100,000 injection 3 mL  3 mL Intradermal Once Omayra Lerma MD          Past Medical/Surgical History:   Patient Active Problem List   Diagnosis    Acne vulgaris    Hidradenitis suppurativa    Migraine with aura and without status migrainosus, not intractable     No past medical history on file.    CC No referring provider defined for this encounter. on close of this encounter.

## 2024-05-20 ENCOUNTER — MYC MEDICAL ADVICE (OUTPATIENT)
Dept: DERMATOLOGY | Facility: CLINIC | Age: 39
End: 2024-05-20

## 2024-05-20 ENCOUNTER — VIRTUAL VISIT (OUTPATIENT)
Dept: DERMATOLOGY | Facility: CLINIC | Age: 39
End: 2024-05-20
Payer: COMMERCIAL

## 2024-05-20 DIAGNOSIS — Z51.81 MEDICATION MONITORING ENCOUNTER: Primary | ICD-10-CM

## 2024-05-20 PROCEDURE — 99442 PR PHYSICIAN TELEPHONE EVALUATION 11-20 MIN: CPT | Performed by: DERMATOLOGY

## 2024-05-20 NOTE — NURSING NOTE
Teledermatology Nurse Call Patients:     Are you in the [facility] at the time of the encounter? Yes    Today's visit will be billed to you and your insurance.    A teledermatology visit is not as thorough as an in-person visit and the quality of the photograph sent may not be of the same quality as that taken by the dermatology clinic.  Roro Webster, [facility]

## 2024-05-20 NOTE — LETTER
5/20/2024         RE: oSphia De Leon  [address] [facility] [facility] [address]        Dear Colleague,    Thank you for referring your patient, Sophia De Leon, to the [facility]. Please see a copy of my visit note below.      [facility] Dermatology Note  Encounter Date: May 20, 2024  Store-and-Forward and Telephone ([phone]). Location of teledermatologist: [facility].  Start time: 1:08pm. End time: 1:22pm.    Dermatology Problem List:  1. Nodulocystic acne  -flare of R forehead 08/25/22 in dermatomal distribution ddx zoster vs Pityrosporum folliculitis vs pyoderma faciale.  -s/p valtrex without improvement  -bacterial culture with normal jonathan  -swabbed for zoster PCR 08/25/22   2. Hidradenitis suppurativa, Beltran stage II  -Current: , benzoyl peroxide 2.5% wash, minocycline 100mg BID  -Past: oral antibiotics (minocycline and rifampin), Humira (migraines), isotretinoin (dryness, headaches and dizziness),  anakinra prescribed (did not start), ustekinumab (did not start)  - ILK did help the inflammatory on cheek  - Prednisone made her worse and made dizziness worse.  - Last quantiferon gold: 2018  - Hep C ab neg 11/3/20  - HIV neg 11/3/20  - Hep B: surface ab +, surface antigen neg c/w immunity 8/12/18  - CBC last wnl 11/3/20  - BMP wnl 12/27/20  3. 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 Hx of Afib after childbirth   5. Wart, left heel  6. Hx of high-risk HPV  7. Seborrheic dermatitis, scalp  - ketoconazole 2% shampoo, lidex 0.05% solution    ____________________________________________    Assessment & Plan:        # acne/rosacea-improved significantly  -continue accutane 20mg daily(after eye check)  - stop topical creams on face.   - Labs reviewed: pending HCG blood.   --The patient was again counseled on the following: The patient cannot give blood while on isotretinoin or share the drug   Reviewed need to avoid alcohol on medication. Eye dryness and bloody nose are possible side effects and the patient should seek care of these if they occur. The patient will call for severe flares and seek care along with stopping the drug for SI or mood issues.  Reviewed to call for flares. Reviewed photosensitivity and need for sunscreen. Call us if you flaring.   - Labs due today: pending  -The patients is abstinent, reviewed meaning  -pt reports acne on torso, okay to send photo if she would like(Dr. Franco did give her prednisone which she hasn't taken)     # Hidradenitis Suppurativa   - do not use minocycline  - Topical spironolactone is okay for HS  -clobetasol for itchy on groin  -clindamycin is okay for Hs flares      #headaches, has hx of migrains with aura  -eye pressure check, hold drug until given okay from eye      Procedures Performed:    None    Follow-up: 1 month(s) virtually (telephone with photos). DO not refill until the accutane until pregnancy test is negative. 5/30/2024-- these will go to Dr. Franco    Staff and Scribe:   Scribe Disclosure:   I, Lyndsay Colmenares, am serving as a scribe to document services personally performed by Danielle Shepard MD based on data collection and the provider's statements to me.     Provider Disclosure:   The documentation recorded by the scribe accurately reflects the services I personally performed and the decisions made by me.    Danielle Shepard MD    Department of Dermatology  [facility]: Phone: [phone], Fax:[phone]  [facility]: Phone: [phone], Fax: [phone]   ____________________________________________    CC: Accutane (Acne vulgaris. Current treatment: ISOtretinoin (ACCUTANE) 10 MG capsule twice daily. Patient reports headaches and dryness especially to lips and eyes, achy joints. )    HPI:  Ms. Sophia De Leon is a(n) 38  year old female who presents today as a return patient for Accutane start. SHe is doing well    She feels she is overall    Today, patient reports headaches    Has some blurry vision  Patient is otherwise feeling well, without additional skin concerns.    Labs Reviewed:  Component      Latest Ref Rng 5/2/2024  10:27 AM   % Retic      % 1.0    Absolute Retic      10e6/uL 0.044    hCG Quantitative      <5 mIU/mL <1          Physical Exam:  Vitals: There were no vitals taken for this visit.  SKIN: Teledermatology photos were reviewed; image quality and interpretability:  acceptable. Image date: face.  - acneiform papules and pustules  - No other lesions of concern on areas examined.     Medications:  Current Outpatient Medications   Medication Sig Dispense Refill     Acetaminophen (TYLENOL PO) Take 1,000 mg by mouth as needed for mild pain PRN       azelaic acid (FINACIA) 15 % external gel Apply once daily to the face 50 g 3     benzoyl peroxide 5 % external liquid Apply to affected areas for 20 seconds daily, then rinse. 226 g 11     calcium carbonate 500 MG CHEW Take 1-2 tablets by mouth as needed (heartburn)       cetirizine (ZYRTEC) 10 MG tablet Take 10 mg by mouth daily as needed for allergies       cholecalciferol (VITAMIN D3) 125 mcg (5000 units) capsule Take 250 mcg by mouth every 2nd or 3rd day       clindamycin (CLEOCIN T) 1 % external lotion Apply topically 2 times daily 60 mL 3     clobetasol (TEMOVATE) 0.05 % external ointment Apply to itchy area in groin twice a day as neeeded 60 g 0     clotrimazole (LOTRIMIN) 1 % external cream Apply topically 2 times daily 45 g 3     cod liver oil CAPS capsule Take 1 capsule by mouth daily 460 mg omega 3 fatty acids       COLLAGEN PO Take 1-2 tablets by mouth daily . Contains Calcium + Biotin       diclofenac (ASPERCREME ARTHRITIS PAIN) 1 % topical gel Apply topically as needed (arthritic pain)       ferrous gluconate (FERGON) 324 (38 Fe) MG tablet Take 324 mg by mouth  2-3 times per week. Plus Vitamin C 500 mg chewable when taking.       fluconazole (DIFLUCAN) 150 MG tablet Take 150 mg by mouth once as needed (yeast infection)       fluticasone (FLONASE) 50 MCG/ACT nasal spray Spray 2 sprays in nostril as needed for rhinitis or allergies       IBUPROFEN PO Take 400 mg by mouth as needed for moderate pain PRN       ISOtretinoin (ACCUTANE) 10 MG capsule Take 1 capsule (10 mg) by mouth 2 times daily iPLEDGE #: [id number]. May substitute for generic or brand (I.e., Claravis) per insurance requirements. 60 capsule 0     ketoconazole (NIZORAL) 2 % external shampoo Apply topically once a week 120 mL 11     meclizine (ANTIVERT) 12.5 MG tablet Take 12.5-25 mg by mouth 3 times daily as needed (vertigo)       ondansetron (ZOFRAN ODT) 4 MG ODT tab Place 4 mg under the tongue as needed for nausea (with migraines)       rizatriptan (MAXALT-MLT) 10 MG ODT Take 10 mg by mouth at onset of headache for migraine       tretinoin (RETIN-A) 0.025 % external cream Use every night 45 g 3     urea (GORMEL) 20 % external cream Apply topically 2 times daily as needed (for feet dryness)       Current Facility-Administered Medications   Medication Dose Route Frequency Provider Last Rate Last Admin     lidocaine 1% with EPINEPHrine 1:100,000 injection 3 mL  3 mL Intradermal Once Omayra Lerma MD          Past Medical/Surgical History:   Patient Active Problem List   Diagnosis     Acne vulgaris     Hidradenitis suppurativa     Migraine with aura and without status migrainosus, not intractable     No past medical history on file.    CC No referring provider defined for this encounter. on close of this encounter.      Again, thank you for allowing me to participate in the care of your patient.        Sincerely,        Danielle Shepard MD

## 2024-05-28 ENCOUNTER — VIRTUAL VISIT (OUTPATIENT)
Dept: DERMATOLOGY | Facility: CLINIC | Age: 39
End: 2024-05-28
Attending: DERMATOLOGY
Payer: COMMERCIAL

## 2024-05-28 DIAGNOSIS — Z78.9 TAKES DIETARY SUPPLEMENTS: ICD-10-CM

## 2024-05-28 DIAGNOSIS — L70.0 ACNE VULGARIS: Primary | ICD-10-CM

## 2024-05-28 DIAGNOSIS — L73.2 HIDRADENITIS SUPPURATIVA: ICD-10-CM

## 2024-05-28 NOTE — PROGRESS NOTES
Medication Therapy Management (MTM) Encounter    ASSESSMENT:                            Acne/Rosacea/Hidradenitis suppurativa:   Started low dose isotretinoin (0.3 mg/kg/day) ~4 weeks ago. Some improvement in acne but with reports of worsened HS flares and various side effects. Saw optometry for dry eyes & blurred vision and they felt if she is able to tolerate side effects that she can continue. Most concerning are reports of ringing/pressure in ears, dizziness & trouble speaking. This could also be related to worsened headaches & migraines which she has an underlying history of. Recommended she continue to hold the isotretinoin for now & wait to restart until she has discussed side effect concerns further with Dr. Shepard. Discussed although these side effects do not require the isotretinoin be stopped that she will need to consider whether the potential benefits outweigh risks. May also consider reducing dose further to 10 mg daily to try to improve overall tolerance (0.15 mg/kg/day) & titrate as able. There are some studies that have shown efficacy with low-dose isotretinoin at 0.3 to 0.5 mg/kg/day for moderate acne. Will complete monthly pregnancy test to maintain iPLEDGE requirements for now. Recheck TG, ALT & AST in ~1-2 months if isotretinoin is continued.     Supplements:   Vitamin D & iron labs previously ordered. Pending lab results may adjust vitamin D & iron supplement. Advised patient to hold cod liver oil supplement while on isotretinoin as this contains vitamin A derivative.     PLAN:                            Hold the isotretinoin for now & discuss risks vs benefits further with Dr. Shepard when you see her next week.   May consider reducing isotretinoin 10 mg daily (0.15 mg/kg/day)  If isotretinoin is continued:   Recheck safety labs (TG, ALT, AST) in ~1-2 months & repeat until dose is stabilized   Determine plan for isotretinoin monthly follow ups   Continue to hold the crill oil supplement since  this contains Vitamin A   Lab appointment on 5/30/24 at 10:15 am at the [facility]     Follow-up:   - Dermatology (Dr. Shepard) on 6/3/24 at 11:15 am   - Dermatology (Dr. Franco) on 7/11/24 at 4:00 pm     SUBJECTIVE/OBJECTIVE:                          Sophia De Leon is a 38 year old female called for an initial visit. She was referred to me from Dr. Timmy Franco MD.      Reason for visit: isotretinoin management.    Medication Adherence/Access:   Isotretinoin coverage:   -  Covered. No PA required     Acne/Rosacea/Hidradenitis suppurativa:   - isotretinoin 10 mg twice daily (start date: ~5/4/24, currently holding)  - clobetasol 0.05% ointment as needed to groin (HS)   - clindamycin 1% lotion as needed for HS & acne   - benzoyl peroxide 5% wash daily as needed   Isotretinoin side effects - daily headaches & increased frequency of migraines with aura & dizziness; worsened dry eye with visual disturbances; recent incident of ringing/pressure in ears, trouble speaking & dizziness; some general dryness of skin & lips but has been tolerable. Patients major concerns are with the headaches & visual changes.     She is currently holding isotretinoin (since 5/24/24) as advised due to her concerns with side effects. Reports side effects have improved with holding.     Saw optometry on 5/22/24 who suspected recent ocular & headache symptoms were related to the isotretinoin - advised patient to increase fluid intake & use artificial tears 4+ times per day to help symptoms. Patient reports her optometrist informed her that eye pressure was normal & that she may continue isotretinoin despite symptoms if able to tolerate. Feels the artifical tears have helped some.     Reports HS seems to be somewhat worse. Most recently reports a new lesion flared in groin/vulva region but inflammation has since come down. Patient decided not to start prednisone taper that was prescribed with stopping the minocycline due to her  concerns with potential side effects.     Acneform papules & pustules on cheek, chest, neck, shoulder & upper back. She has noticed her acne initially 'purged' when isotretioin was started but that these areas are starting to dry out now. Has had some persistent areas along chin/cheek that have not dried out.     Specialist: Dr. Danielle Shepard, Dermatology. Last visit 3/28/24.      Specialist: Dr. Timmy Franco, Dermatology. Last visit 3/28/24.     Previous treatments:   - minocycline 100 mg twice daily (stopped with isotretinoin start in May 2024)  - tretinoin 0.025% cream every other night (holding on isotretinoin)   - spironolactone 5% topical nightly (compounded, holding on isotretinoin)   - azelaic acid 15% gel every other night (holding on isotretinoin)     Monitoring: Enrolled in iPLED # [id number]  Pregnancy test/screening:   Patient who can become pregnant. Patient will continue with monthly pregnancy screening while on isotretinoin .  - 1st checked 3/28/24 (negative) ; Repeat test on 5/2/24 (negative)    - Monthly pregnancy test scheduled for 5/30/24   Contraception methods: Patient agrees to abstinence from vaginal-penile intercourse while on isotretinoin.   Patient agrees to the following:   - REMS program requirements  - Not to donate blood while on isotretinoin  - Not to share or redistribute isotretinoin.     Baseline labs checked 11/2/24. Due for recheck ~1-2 month after starting isotretinoin.   - Triglycerides: 70 mg/dL (normal)   - AST: 24 U/L (normal)   - ALT:  24 U/L (normal)      Cumulative dose (based on outside fill history) = ~600 mg   - 30 days x 10 mg BID: 600 mg (not yet filled)      Goal total cumulative dose (120-150mg/kg) = 7800 mg to 9750 mg     Patient reported home weight: ~65 kg (143 lbs)      Weight based dosing (based on patient weight of 65 kg)  40 mg BID= 1.23 mg/kg/day  30 mg BID= 0.92 mg/kg/day   20 mg BID=0.62 mg/kg/day   10 mg BID=0.31 mg/kg/day   10 mg QD=0.15  mg/kg/day    Supplements:   - Ferrous gluconate 324 mg 2-3 times weekly (use limited by GI side effects)   - Vitamin D3 5000 units every 2-3 days   - Collagen 1-2 tablets daily     Patient wonders if she can continue to take Cod liver oil supplement - reports this contains a Vitamin A derivative. She would like her iron & Vitamin D levels to be rechecked with next labs (previously ordered)         Allergies/ADRs: Reviewed in chart  Past Medical History: Reviewed in chart  Tobacco: She reports that she has never smoked. She has never used smokeless tobacco.  ----------------    I spent 40 minutes with this patient today. All changes were made via collaborative practice agreement with Dr. Timmy Franco. A copy of the visit note was provided to the patient's provider(s).    A summary of these recommendations was sent via [employer].    Sarita Zazueta, PharmD  Medication Therapy Management Pharmacist   [facility] Dermatology    Telemedicine Visit Details  Type of service:  Telephone visit  Start Time:  11:01am  End Time:  11:41am     Medication Therapy Recommendations  No medication therapy recommendations to display

## 2024-05-28 NOTE — Clinical Note
Routing updates. Holding isotretinoin due to side effects. Saw optometry & they said she could continue if able to tolerate dry eyes & blurred vision but no major ocular concerns to require this be stopped. Pt also reported an episode of sudden dizziness, trouble speaking & ringing/pressure in ears. Some of these SEs may be related to worsened headaches/migraines (hx of this). Since she had already been holding this & has various SE concerns - I advised her to continue to hold until she can discuss further with Dr. Shepard at their follow-up (6/3/24). If she decides to continue could try to reduce dose to 10 mg QD to see if tolerance improves & titrate up as able. Also had a recent HS flare which could be related to both the isotretinoin & stopping minocycline (pt did not start prednisone taper). Lastly if she decides to continue will need to establish a plan for who is managing monthly follow-ups - if Dr. Shepard has the capacity with her schedule to see her vs me then that is fine by me.  Sarita Zazueta, [facility]

## 2024-05-28 NOTE — Clinical Note
5/28/2024       RE: Sophia De Leon  [address] [facility] [address]     Dear Colleague,    Thank you for referring your patient, Sophia De Leon, to the [facility] RHEUMATOLOGY [facility]IN[facility] [place] at [facility]. Please see a copy of my visit note below.    Medication Therapy Management (MTM) Encounter    ASSESSMENT:                            Medication Adherence/Access:   Isotretinoin coverage -  No PA required by insurance, this is covered    Acne/Rosacea/Hidradenitis suppurativa:   Flaring, dermatology recommended initiation of isotretinoin to see if this helps with both acne & HS. Reviewed instructions to discontinue minocycline & start short prednisone taper when starting isotretinoin. Patients repeat pregnancy test from 5/2/24 was negative, she can now safely start isotretinoin per iPLEDGE requirements. Patient agreed to all iPLEDGE requirements including monthly pregnancy tests & abstinence while on treatment. Given patient preference for lowest possible starting dose, will start isotretinoin 10 mg twice daily (0.31 mg/kg/day) and titrate as tolerated to a target dose of ~1 mg/kg/day. Goal total cumulative dose is 120-150mg/kg (7800 mg to 9750 mg).  Baseline labs completed. Recheck TG, ALT & AST ~1-2 months after starting & until dose is stabilized.     Supplements:   Continues on Vitamin D & iron supplement. Will recheck Vitamin D & iron labs at future lab appointment.     PLAN:                            Hold the isotretinoin for now & discuss with Dr. Shepard when you see her next week.     Continue to hold the crill oil supplement since this contains Vitamin A     Lab appointment on 5/30/24 at 10:15 am at the [facility]   Pregnancy screening plus vitamin D, iron & ferritin labs as requested     Future: Recheck TG, ALT, & AST around 1-2 months after starting isotretinoin    Follow-up:   - MTM appointment: with me (Sarita Zazueta [facility]) by phone  on Friday May 31st at 12:30 pm   - Dermatology appointment: with Dr. Franco on 7/11/24     SUBJECTIVE/OBJECTIVE:                          Sophia De Leon is a 38 year old female called for an initial visit. She was referred to me from Dr. Timmy Franco MD.      Reason for visit: isotretinoin management.    Medication Adherence/Access:   Isotretinoin coverage:   -  Covered. No PA required     Acne/Rosacea/Hidradenitis suppurativa:   - isotretinoin 10 mg twice daily (start date: ~5/4/24, currently holding)  - clobetasol 0.05% ointment as needed to groin (HS)   - clindamycin 1% lotion as needed for HS & acne   - benzoyl peroxide 5% wash daily as needed   Isotretinoin side effects - daily headaches & increased frequency of migraines with aura & dizziness, has also had worsened dry eye with visual disturbances, recent incident of ringing/pressure in ears & dizziness. Has noticed some general dryness of skin & lips but this has been tolerable. Her major concerns are with the headaches & visual changes.     She was seen by her optometrist on 5/22/24 who suspected recent ocular & headache symptoms were related to the isotretinoin & advised the patient to increase fluid intake & use artificial tears 4+ times per day to help alleviate symptoms. Patient reports her optometrist informed her that eye pressure was normal & that patient could continue isotretinoin despite symptoms if tolerable.   Patient feels the artifical tears have helped some.     She has been holding the isotretinoin (5/24/24) as I had advised due to patient reports of ringing/pressure in ears, dizziness & trouble speaking. These symptoms have not reoccurred since holding.     Reports HS flares has somewhat worsened with new acute lesions since minocycline was stopped & isotretinoin was started. More recently reports a flare in groin but inflammtion has come down. She did not start the prednisone taper prescribed due to side effects concerns.     Acneform  papules & pustules on cheek, chest, neck, shoulder & upper back. Reports noticing her acne initially 'purged' when isotretioin was started but that she has now noticed there areas are starting to dry out. Has had some persistent areas along chin/cheek that did not dry out.     Specialist: Dr. Danielle Shepard, Dermatology. The follow was recommend at Derm follow up on 3/28/24:     Specialist: Dr. Timmy Franco, Dermatology. The follow was recommend at Derm follow up on 3/28/24:   - Plan to start isotretinoin for acne/rosacea and will see how HS responds.   - Will start  prednisone burst at that time and stop minocycline    Previous treatments:   - minocycline 100 mg twice daily (stopped May 2024)  - tretinoin 0.025% cream every other night (alternates with azelaic acid, holding on isotretinoin)   - spironolactone 5% topical nightly (compounded, holding on isotretinoin)   - azelaic acid 15% gel every other night (holding on isotretinoin)     Monitoring: Enrolled in iPLEDGE # [id number]  Pregnancy test/screening: Patient who can become pregnant   - Pregnancy screeninst checked 3/28/24 (negative) ; Repeat test on 24 (negative)     - Patient will continue with monthly pregnancy screening while on isotretinoin   - Patient agrees to abstinence from vaginal-penile intercourse while on isotretinoin   - Patient agrees to the following:   - REMS program requirements  - Not to donate blood while on isotretinoin  - Not to share or redistribute isotretinoin.     Baseline labs checked 24. Due for recheck ~1-2 month after starting isotretinoin.   - Triglycerides: 70 mg/dL (normal)   - AST: 24 U/L (normal)   - ALT:  24 U/L (normal)      Cumulative dose (based on outside fill history) = 0 mg   - 30 days x 10 mg BID: 600 mg (not yet filled)      Goal total cumulative dose (120-150mg/kg) = 7800 mg to 9750 mg     Patient reported home weight: ~65 kg (143 lbs)      Weight based dosing (based on patient weight of 65 kg)  40 mg  BID= 1.23 mg/kg/day  30 mg BID= 0.92 mg/kg/day   20 mg BID=0.62 mg/kg/day   10 mg BID=0.31 mg/kg/day     Supplements:   - Ferrous gluconate 324 mg 2-3 times weekly (use limited by GI side effects)   - Vitamin D3 5000 units every 2-3 days   - Cod liver oil on days when not taking Vitamin D   - Collagen 1-2 tablets daily   No reported issues at this time.   Patient is not interested in stopping/changing supplements at this time, but would like her iron & Vitamin D levels to be rechecked with next labs.             Allergies/ADRs: Reviewed in chart  Past Medical History: Reviewed in chart  Tobacco: She reports that she has never smoked. She has never used smokeless tobacco.  ----------------    I spent 40 minutes with this patient today. All changes were made via collaborative practice agreement with Dr. Timmy Franco. A copy of the visit note was provided to the patient's provider(s).    A summary of these recommendations was sent via [employer]    Laith TysonD  Medication Therapy Management Pharmacist   [facility] Dermatology    Telemedicine Visit Details  Type of service:  Telephone visit  Start Time:  11:01am  End Time:  11:41am     Medication Therapy Recommendations  No medication therapy recommendations to display         Medication Therapy Management (MTM) Encounter    ASSESSMENT:                            Acne/Rosacea/Hidradenitis suppurativa:   Started low dose isotretinoin (0.3 mg/kg/day) ~4 weeks ago. Patient has noticed some improvement in acne, worsened HS flares, and various side effects. Saw optometrist for dry eyes & blurred vision and they felt if she is able to tolerate these side effects that she can continue. Most concerning are reports of ringing/pressure in ears, dizziness & trouble speaking. Headaches & migraines have also worsened which may not be tolerable for patient. Recommended she continue to hold the isotretinoin for now & wait to restart until she has discussed this  further with Dr. Shepard. Discussed that although these side effects do not require the isotretinoin be stopped that it will likely come down to whether she feels the benefits outweigh risks. Also discussed we could reduce her dose further to 10 mg daily to try to improve her overall tolerance, but that she will likely not perceive any benefits at this low of a dose (0.15 mg/kg/day). However, there are some studies that have shown efficacy with low-dose isotretinoin at 0.3 to 0.5 mg/kg/day for moderate acne. She will complete monthly pregnancy test to maintain iPLEDGE requirements for now. Recheck TG, ALT & AST in ~1-2 months if isotretinoin is continued.     Supplements:   Vitamin D & iron labs previously ordered. Pending lab results may adjust vitamin D & iron supplement. Advised patient to hold cod liver oil supplement while on isotretinoin as this contains vitamin A derivative.     PLAN:                            Hold the isotretinoin for now & discuss risks vs benefits further with Dr. Shepard when you see her next week.   May consider reducing isotretinoin 10 mg daily (0.15 mg/kg/day)  If isotretinoin is continued:   Recheck safety labs (TG, ALT, AST) in ~1-2 months & repeat until dose is stabilized   Determine plan for isotretinoin monthly follow ups   Continue to hold the crill oil supplement since this contains Vitamin A   Lab appointment on 5/30/24 at 10:15 am at the [facility]     Follow-up:   - Dermatology (Dr. Shepard) on 6/3/24 at 11:15 am   - Dermatology (Dr. Franco) on 7/11/24 at 4:00 pm     SUBJECTIVE/OBJECTIVE:                          Sophia De Leon is a 38 year old female called for an initial visit. She was referred to me from Dr. Timmy Franco MD.      Reason for visit: isotretinoin management.    Medication Adherence/Access:   Isotretinoin coverage:   -  Covered. No PA required     Acne/Rosacea/Hidradenitis suppurativa:   - isotretinoin 10 mg twice daily (start date: ~5/4/24,  currently holding)  - clobetasol 0.05% ointment as needed to groin (HS)   - clindamycin 1% lotion as needed for HS & acne   - benzoyl peroxide 5% wash daily as needed   Isotretinoin side effects - daily headaches & increased frequency of migraines with aura & dizziness, has also had worsened dry eye with visual disturbances, recent incident of ringing/pressure in ears & dizziness. Has noticed some general dryness of skin & lips but this has been tolerable. Her major concerns are with the headaches & visual changes.     Saw her optometrist on 5/22/24 who suspected recent ocular & headache symptoms were related to the isotretinoin - advised patient to increase fluid intake & use artificial tears 4+ times per day to help symptoms. Patient reports her optometrist informed her that eye pressure was normal & that she may continue isotretinoin despite symptoms if able to tolerate. Feels the artifical tears have helped some.     Patient is currently holding the isotretinoin (since 5/24/24) as advised due to reports of ringing/pressure in ears, dizziness & trouble speaking. These symptoms have not reoccurred since holding.     Reports HS flares has somewhat worsened with new acute lesions. Most recently reports a flare in groin/vulva region but inflammation has come down. Patient decided not to start prednisone taper that was prescribed with stopping the minocycline as she was concerned with potential side effects.     Acneform papules & pustules on cheek, chest, neck, shoulder & upper back. Patient reports she noticed that her acne initially 'purged' when isotretioin was started but that there areas are starting to dry out now. Has had some persistent areas along chin/cheek that have not dried out.     Specialist: Dr. Danielle Shepard, Dermatology. Last visit 3/28/24.      Specialist: Dr. Timmy Franco, Dermatology. Last visit 3/28/24.     Previous treatments:   - minocycline 100 mg twice daily (stopped with isotretinoin start  in May 2024)  - tretinoin 0.025% cream every other night (holding on isotretinoin)   - spironolactone 5% topical nightly (compounded, holding on isotretinoin)   - azelaic acid 15% gel every other night (holding on isotretinoin)     Monitoring: Enrolled in iPLEDGE # [id number]  Pregnancy test/screening:   Patient who can become pregnant. Patient will continue with monthly pregnancy screening while on isotretinoin .  - 1st checked 3/28/24 (negative) ; Repeat test on 5/2/24 (negative)    - Monthly pregnancy test scheduled for 5/30/24   Contraception methods: Patient agrees to abstinence from vaginal-penile intercourse while on isotretinoin.   Patient agrees to the following:   - REMS program requirements  - Not to donate blood while on isotretinoin  - Not to share or redistribute isotretinoin.     Baseline labs checked 11/2/24. Due for recheck ~1-2 month after starting isotretinoin.   - Triglycerides: 70 mg/dL (normal)   - AST: 24 U/L (normal)   - ALT:  24 U/L (normal)      Cumulative dose (based on outside fill history) = ~600 mg   - 30 days x 10 mg BID: 600 mg (not yet filled)      Goal total cumulative dose (120-150mg/kg) = 7800 mg to 9750 mg     Patient reported home weight: ~65 kg (143 lbs)      Weight based dosing (based on patient weight of 65 kg)  40 mg BID= 1.23 mg/kg/day  30 mg BID= 0.92 mg/kg/day   20 mg BID=0.62 mg/kg/day   10 mg BID=0.31 mg/kg/day   10 mg QD=0.15 mg/kg/day    Supplements:   - Ferrous gluconate 324 mg 2-3 times weekly (use limited by GI side effects)   - Vitamin D3 5000 units every 2-3 days   - Collagen 1-2 tablets daily     Patient wonders if she can continue to take Cod liver oil supplement - reports this contains a Vitamin A derivative. She would like her iron & Vitamin D levels to be rechecked with next labs (previously ordered)         Allergies/ADRs: Reviewed in chart  Past Medical History: Reviewed in chart  Tobacco: She reports that she has never smoked. She has never used smokeless  tobacco.  ----------------    I spent 40 minutes with this patient today. All changes were made via collaborative practice agreement with Dr. Timmy Franco. A copy of the visit note was provided to the patient's provider(s).    A summary of these recommendations was sent via [employer].    Sarita Zazueta PharmD  Medication Therapy Management Pharmacist   [facility] Dermatology    Telemedicine Visit Details  Type of service:  Telephone visit  Start Time:  11:01am  End Time:  11:41am     Medication Therapy Recommendations  No medication therapy recommendations to display           Again, thank you for allowing me to participate in the care of your patient.      Sincerely,    Sarita Zazueta RPH

## 2024-05-29 ENCOUNTER — MYC MEDICAL ADVICE (OUTPATIENT)
Dept: DERMATOLOGY | Facility: CLINIC | Age: 39
End: 2024-05-29
Payer: COMMERCIAL

## 2024-05-29 NOTE — PROGRESS NOTES
[facility] Dermatology Note  Encounter Date: Suhail 3, 2024  [employer] and Telephone ([phone]). Location of teledermatologist: [facility].  Start time: 11:32AM. End time: 11:42am.    Dermatology Problem List:  1. Nodulocystic acne  -flare of R forehead 08/25/22 in dermatomal distribution ddx zoster vs Pityrosporum folliculitis vs pyoderma faciale.  -s/p valtrex without improvement  -bacterial culture with normal jonathan  -swabbed for zoster PCR 08/25/22   2. Hidradenitis suppurativa, Beltran stage II  -Current: , benzoyl peroxide 2.5% wash, minocycline 100mg BID  -Past: oral antibiotics (minocycline and rifampin), Humira (migraines), isotretinoin (dryness, headaches and dizziness),  anakinra prescribed (did not start), ustekinumab (did not start)  - ILK did help the inflammatory on cheek  - Prednisone made her worse and made dizziness worse.  - Last quantiferon gold: 2018  - Hep C ab neg 11/3/20  - HIV neg 11/3/20  - Hep B: surface ab +, surface antigen neg c/w immunity 8/12/18  - CBC last wnl 11/3/20  - BMP wnl 12/27/20  3. 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 Hx of Afib after childbirth   5. Wart, left heel  6. Hx of high-risk HPV  7. Seborrheic dermatitis, scalp  - ketoconazole 2% shampoo, lidex 0.05% solution    ____________________________________________    Assessment & Plan:     # acne/rosacea-improved   - drop accutane down to 10mg daily  - Labs reviewed: pending HCG blood.    --The patient was again counseled on the following: The patient cannot give blood while on isotretinoin or share the drug  Reviewed need to avoid alcohol on medication. Eye dryness and bloody nose are possible side effects and the patient should seek care of these if they occur. The patient will call for severe flares and seek care along with stopping the drug for SI or mood issues.  Reviewed to call for flares. Reviewed  photosensitivity and need for sunscreen. Call if you worsen, avoid skin procedure  -she is abstinent, reviewed meaning and she is compliant  -pt reports acne on torso, okay to send photo if she would like(Dr. Franco did give her prednisone which she hasn't taken)      # Hidradenitis Suppurativa   -clobetasol for itchy on groin- she wants this for flares, kep on list   -clindamycin  refilled         #headaches, has hx of migrains with aura  -seeing neuro  -eye pressure check negative       Procedures Performed:    None    Follow-up: 1 months photos and phone with labs    Staff and Scribe:   Scribe Disclosure:   I, Lyndsay Colmenares, am serving as a scribe to document services personally performed by Danielle Shepard MD based on data collection and the provider's statements to me.     Provider Disclosure:   The documentation recorded by the scribe accurately reflects the services I personally performed and the decisions made by me.    Danielle Shepard MD    Department of Dermatology  [facility]: Phone: [phone], Fax:[phone]  [facility]: Phone: [phone], Fax: [phone]   ____________________________________________    CC: Accutane (Accutane follow-up, patient stopped accutane after side effects. Patient would like to talk to Dr Shepard about restarting or trying new medication,. )    HPI:  Ms. Sophia De Leon is a(n) 38 year old female who presents today as a return patient for Accutane management.    Today, patient reports  she is getting better    Headaches, she saw neuro and got new medications and also was okayed by her eye doctor    Using for more eyedrops    No mood issues or suicidal thoughs    Not using alcohol    .        Patient is otherwise feeling well, without additional skin concerns.    Labs Reviewed:  Last Hcg negative, reviewed    Physical Exam:  Vitals: There were no vitals  taken for this visit.  SKIN: Teledermatology photos were reviewed; image quality and interpretability:  acceptable. Image date: today.  - acneiform lesions lower face  - No other lesions of concern on areas examined.     Medications:  Current Outpatient Medications   Medication Sig Dispense Refill    Acetaminophen (TYLENOL PO) Take 1,000 mg by mouth as needed for mild pain PRN      calcium carbonate 500 MG CHEW Take 1-2 tablets by mouth as needed (heartburn)      cetirizine (ZYRTEC) 10 MG tablet Take 10 mg by mouth daily as needed for allergies      cholecalciferol (VITAMIN D3) 125 mcg (5000 units) capsule Take 250 mcg by mouth every 2nd or 3rd day      clindamycin (CLEOCIN T) 1 % external lotion Apply topically 2 times daily 60 mL 3    clobetasol (TEMOVATE) 0.05 % external ointment Apply to itchy area in groin twice a day as neeeded 60 g 0    clotrimazole (LOTRIMIN) 1 % external cream Apply topically 2 times daily 45 g 3    COLLAGEN PO Take 1-2 tablets by mouth daily . Contains Calcium + Biotin      diclofenac (ASPERCREME ARTHRITIS PAIN) 1 % topical gel Apply topically as needed (arthritic pain)      ferrous gluconate (FERGON) 324 (38 Fe) MG tablet Take 324 mg by mouth 2-3 times per week. Plus Vitamin C 500 mg chewable when taking.      fluconazole (DIFLUCAN) 150 MG tablet Take 150 mg by mouth once as needed (yeast infection)      fluticasone (FLONASE) 50 MCG/ACT nasal spray Spray 2 sprays in nostril as needed for rhinitis or allergies      IBUPROFEN PO Take 400 mg by mouth as needed for moderate pain PRN      ISOtretinoin (ACCUTANE) 10 MG capsule Take 1 capsule (10 mg) by mouth daily iPLEDGE #: [id number]. May substitute for generic or brand (I.e., Claravis) per insurance requirements. 30 capsule 0    ketoconazole (NIZORAL) 2 % external shampoo Apply topically once a week 120 mL 11    meclizine (ANTIVERT) 12.5 MG tablet Take 12.5-25 mg by mouth 3 times daily as needed (vertigo)      ondansetron (ZOFRAN ODT) 4 MG  ODT tab Place 4 mg under the tongue as needed for nausea (with migraines)      rizatriptan (MAXALT-MLT) 10 MG ODT Take 10 mg by mouth at onset of headache for migraine      urea (GORMEL) 20 % external cream Apply topically 2 times daily as needed (for feet dryness)       Current Facility-Administered Medications   Medication Dose Route Frequency Provider Last Rate Last Admin    lidocaine 1% with EPINEPHrine 1:100,000 injection 3 mL  3 mL Intradermal Once Omayra Lerma MD          Past Medical/Surgical History:   Patient Active Problem List   Diagnosis    Acne vulgaris    Hidradenitis suppurativa    Migraine with aura and without status migrainosus, not intractable     No past medical history on file.    CC Danielle Shepard MD  [address]  [place] [address] on close of this encounter.

## 2024-05-30 ENCOUNTER — LAB (OUTPATIENT)
Dept: LAB | Facility: CLINIC | Age: 39
End: 2024-05-30
Payer: COMMERCIAL

## 2024-05-30 DIAGNOSIS — E55.9 VITAMIN D DEFICIENCY: ICD-10-CM

## 2024-05-30 DIAGNOSIS — E61.1 IRON DEFICIENCY: ICD-10-CM

## 2024-05-30 DIAGNOSIS — L70.0 ACNE VULGARIS: ICD-10-CM

## 2024-05-30 PROCEDURE — 82728 ASSAY OF FERRITIN: CPT

## 2024-05-30 PROCEDURE — 84702 CHORIONIC GONADOTROPIN TEST: CPT

## 2024-05-30 PROCEDURE — 36415 COLL VENOUS BLD VENIPUNCTURE: CPT

## 2024-05-30 PROCEDURE — 83550 IRON BINDING TEST: CPT

## 2024-05-30 PROCEDURE — 83540 ASSAY OF IRON: CPT

## 2024-05-30 PROCEDURE — 82306 VITAMIN D 25 HYDROXY: CPT

## 2024-05-30 NOTE — PATIENT INSTRUCTIONS
"Recommendations from today's MTM visit:                                                      Hold the isotretinoin for now & discuss risks vs benefits further with Dr. Shepard when you see her next week.   May consider reducing isotretinoin 10 mg daily (0.15 mg/kg/day)  If isotretinoin is continued:   Recheck safety labs (TG, ALT, AST) in ~1-2 months & repeat until dose is stabilized   Determine plan for isotretinoin monthly follow ups   Continue to hold the crill oil supplement since this contains Vitamin A   Lab appointment on 5/30/24 at 10:15 am at the [facility]     Follow-up:   - Dermatology (Dr. Shepard) on 6/3/24 at 11:15 am   - Dermatology (Dr. Franco) on 7/11/24 at 4:00 pm     It was great speaking with you today.  I value your experience and would be very thankful for your time in providing feedback in our clinic survey. In the next few days, you may receive an email or text message from [employer] with a link to a survey related to your  clinical pharmacist.\"     To schedule another MTM appointment, please call the clinic directly or you may call the MTM scheduling line at [phone] or toll-free at [phone].     My Clinical Pharmacist's contact information:                                                      Please feel free to contact me with any questions or concerns you have.      Sarita Zazueta, PharmD  MTM Pharmacist  [facility] Dermatology   "

## 2024-05-31 LAB
FERRITIN SERPL-MCNC: 8 NG/ML (ref 6–175)
HCG INTACT+B SERPL-ACNC: <1 MIU/ML
IRON BINDING CAPACITY (ROCHE): 397 UG/DL (ref 240–430)
IRON SATN MFR SERPL: 18 % (ref 15–46)
IRON SERPL-MCNC: 70 UG/DL (ref 37–145)
VIT D+METAB SERPL-MCNC: 25 NG/ML (ref 20–50)

## 2024-06-03 ENCOUNTER — VIRTUAL VISIT (OUTPATIENT)
Dept: DERMATOLOGY | Facility: CLINIC | Age: 39
End: 2024-06-03
Attending: DERMATOLOGY
Payer: COMMERCIAL

## 2024-06-03 DIAGNOSIS — L70.0 ACNE VULGARIS: ICD-10-CM

## 2024-06-03 DIAGNOSIS — Z51.81 MEDICATION MONITORING ENCOUNTER: ICD-10-CM

## 2024-06-03 DIAGNOSIS — L71.9 ACNE ROSACEA: Primary | ICD-10-CM

## 2024-06-03 PROCEDURE — 99441 PR PHYSICIAN TELEPHONE EVALUATION 5-10 MIN: CPT | Performed by: DERMATOLOGY

## 2024-06-03 RX ORDER — ISOTRETINOIN 10 MG/1
10 CAPSULE ORAL DAILY
Qty: 30 CAPSULE | Refills: 0 | Status: SHIPPED | OUTPATIENT
Start: 2024-06-03 | End: 2024-07-08

## 2024-06-03 RX ORDER — CLINDAMYCIN PHOSPHATE 10 UG/ML
LOTION TOPICAL 2 TIMES DAILY
Qty: 60 ML | Refills: 3 | Status: SHIPPED | OUTPATIENT
Start: 2024-06-03

## 2024-06-03 NOTE — LETTER
6/3/2024         RE: Sophia De Leon  [address] [address]        Dear Colleague,    Thank you for referring your patient, Sophia De Leon, to the [facility]. Please see a copy of my visit note below.      [facility] Dermatology Note  Encounter Date: Suhail 3, 2024  Store-and-Forward and Telephone ([phone]). Location of teledermatologist: [facility].  Start time: 11:32AM. End time: 11:42am.    Dermatology Problem List:  1. Nodulocystic acne  -flare of R forehead 08/25/22 in dermatomal distribution ddx zoster vs Pityrosporum folliculitis vs pyoderma faciale.  -s/p valtrex without improvement  -bacterial culture with normal jonathan  -swabbed for zoster PCR 08/25/22   2. Hidradenitis suppurativa, Beltran stage II  -Current: , benzoyl peroxide 2.5% wash, minocycline 100mg BID  -Past: oral antibiotics (minocycline and rifampin), Humira (migraines), isotretinoin (dryness, headaches and dizziness),  anakinra prescribed (did not start), ustekinumab (did not start)  - ILK did help the inflammatory on cheek  - Prednisone made her worse and made dizziness worse.  - Last quantiferon gold: 2018  - Hep C ab neg 11/3/20  - HIV neg 11/3/20  - Hep B: surface ab +, surface antigen neg c/w immunity 8/12/18  - CBC last wnl 11/3/20  - BMP wnl 12/27/20  3. 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 Hx of Afib after childbirth   5. Wart, left heel  6. Hx of high-risk HPV  7. Seborrheic dermatitis, scalp  - ketoconazole 2% shampoo, lidex 0.05% solution    ____________________________________________    Assessment & Plan:     # acne/rosacea-improved   - drop accutane down to 10mg daily  - Labs reviewed: pending HCG blood.    --The patient was again counseled on the following: The patient cannot give blood while on isotretinoin or share the drug  Reviewed need to avoid alcohol on medication. Eye dryness and  bloody nose are possible side effects and the patient should seek care of these if they occur. The patient will call for severe flares and seek care along with stopping the drug for SI or mood issues.  Reviewed to call for flares. Reviewed photosensitivity and need for sunscreen. Call if you worsen, avoid skin procedure  -she is abstinent, reviewed meaning and she is compliant  -pt reports acne on torso, okay to send photo if she would like(Dr. Franco did give her prednisone which she hasn't taken)      # Hidradenitis Suppurativa   -clobetasol for itchy on groin- she wants this for flares, kep on list   -clindamycin  refilled         #headaches, has hx of migrains with aura  -seeing neuro  -eye pressure check negative       Procedures Performed:    None    Follow-up: 1 months photos and phone with labs    Staff and Scribe:   Scribe Disclosure:   I, Lyndsay Colmenares, am serving as a scribe to document services personally performed by Danielle Shepard MD based on data collection and the provider's statements to me.     Provider Disclosure:   The documentation recorded by the scribe accurately reflects the services I personally performed and the decisions made by me.    Danielle Shepard MD    Department of Dermatology  [facility]: Phone: [phone], Fax:[phone]  [facility]: Phone: [phone], Fax: [phone]   ____________________________________________    CC: Accutane (Accutane follow-up, patient stopped accutane after side effects. Patient would like to talk to Dr Shepard about restarting or trying new medication,. )    HPI:  Ms. Sophia De Leon is a(n) 38 year old female who presents today as a return patient for Accutane management.    Today, patient reports  she is getting better    Headaches, she saw neuro and got new medications and also was okayed by her eye doctor    Using for more  eyedrops    No mood issues or suicidal thoughs    Not using alcohol    .        Patient is otherwise feeling well, without additional skin concerns.    Labs Reviewed:  Last Hcg negative, reviewed    Physical Exam:  Vitals: There were no vitals taken for this visit.  SKIN: Teledermatology photos were reviewed; image quality and interpretability:  acceptable. Image date: today.  - acneiform lesions lower face  - No other lesions of concern on areas examined.     Medications:  Current Outpatient Medications   Medication Sig Dispense Refill     Acetaminophen (TYLENOL PO) Take 1,000 mg by mouth as needed for mild pain PRN       calcium carbonate 500 MG CHEW Take 1-2 tablets by mouth as needed (heartburn)       cetirizine (ZYRTEC) 10 MG tablet Take 10 mg by mouth daily as needed for allergies       cholecalciferol (VITAMIN D3) 125 mcg (5000 units) capsule Take 250 mcg by mouth every 2nd or 3rd day       clindamycin (CLEOCIN T) 1 % external lotion Apply topically 2 times daily 60 mL 3     clobetasol (TEMOVATE) 0.05 % external ointment Apply to itchy area in groin twice a day as neeeded 60 g 0     clotrimazole (LOTRIMIN) 1 % external cream Apply topically 2 times daily 45 g 3     COLLAGEN PO Take 1-2 tablets by mouth daily . Contains Calcium + Biotin       diclofenac (ASPERCREME ARTHRITIS PAIN) 1 % topical gel Apply topically as needed (arthritic pain)       ferrous gluconate (FERGON) 324 (38 Fe) MG tablet Take 324 mg by mouth 2-3 times per week. Plus Vitamin C 500 mg chewable when taking.       fluconazole (DIFLUCAN) 150 MG tablet Take 150 mg by mouth once as needed (yeast infection)       fluticasone (FLONASE) 50 MCG/ACT nasal spray Spray 2 sprays in nostril as needed for rhinitis or allergies       IBUPROFEN PO Take 400 mg by mouth as needed for moderate pain PRN       ISOtretinoin (ACCUTANE) 10 MG capsule Take 1 capsule (10 mg) by mouth daily iPLEDGE #: [id number]. May substitute for generic or brand (I.e., Claravis)  per insurance requirements. 30 capsule 0     ketoconazole (NIZORAL) 2 % external shampoo Apply topically once a week 120 mL 11     meclizine (ANTIVERT) 12.5 MG tablet Take 12.5-25 mg by mouth 3 times daily as needed (vertigo)       ondansetron (ZOFRAN ODT) 4 MG ODT tab Place 4 mg under the tongue as needed for nausea (with migraines)       rizatriptan (MAXALT-MLT) 10 MG ODT Take 10 mg by mouth at onset of headache for migraine       urea (GORMEL) 20 % external cream Apply topically 2 times daily as needed (for feet dryness)       Current Facility-Administered Medications   Medication Dose Route Frequency Provider Last Rate Last Admin     lidocaine 1% with EPINEPHrine 1:100,000 injection 3 mL  3 mL Intradermal Once Omayra Lerma MD          Past Medical/Surgical History:   Patient Active Problem List   Diagnosis     Acne vulgaris     Hidradenitis suppurativa     Migraine with aura and without status migrainosus, not intractable     No past medical history on file.    CC Danielle Shepard MD  [address] [place] [address] [address]  [place] [address] on close of this encounter.      Again, thank you for allowing me to participate in the care of your patient.        Sincerely,        Danielle Shepard MD

## 2024-06-03 NOTE — NURSING NOTE
Teledermatology Nurse Call Patients:     Are you in the [facility] at the time of the encounter? yes    Today's visit will be billed to you and your insurance.    A teledermatology visit is not as thorough as an in-person visit and the quality of the photograph sent may not be of the same quality as that taken by the dermatology clinic.     Dermatology Rooming Note    Sophia De Leon's goals for this visit include:   Chief Complaint   Patient presents with    Accutane     Accutane follow-up, patient stopped accutane after side effects. Patient would like to talk to Dr Shepard about restarting or trying new medication,.         - Does patient has questions about Accutane    Maricel Gastelum

## 2024-06-05 ENCOUNTER — MYC MEDICAL ADVICE (OUTPATIENT)
Dept: DERMATOLOGY | Facility: CLINIC | Age: 39
End: 2024-06-05
Payer: COMMERCIAL

## 2024-07-02 ENCOUNTER — LAB (OUTPATIENT)
Dept: LAB | Facility: CLINIC | Age: 39
End: 2024-07-02
Payer: COMMERCIAL

## 2024-07-02 DIAGNOSIS — Z51.81 MEDICATION MONITORING ENCOUNTER: ICD-10-CM

## 2024-07-02 PROCEDURE — 84450 TRANSFERASE (AST) (SGOT): CPT

## 2024-07-02 PROCEDURE — 36415 COLL VENOUS BLD VENIPUNCTURE: CPT

## 2024-07-02 PROCEDURE — 84460 ALANINE AMINO (ALT) (SGPT): CPT

## 2024-07-02 PROCEDURE — 84478 ASSAY OF TRIGLYCERIDES: CPT

## 2024-07-02 PROCEDURE — 84702 CHORIONIC GONADOTROPIN TEST: CPT

## 2024-07-03 LAB
ALT SERPL W P-5'-P-CCNC: 17 U/L (ref 0–50)
AST SERPL W P-5'-P-CCNC: 18 U/L (ref 0–45)
FASTING STATUS PATIENT QL REPORTED: NO
HCG INTACT+B SERPL-ACNC: <1 MIU/ML
TRIGL SERPL-MCNC: 70 MG/DL

## 2024-07-08 ENCOUNTER — VIRTUAL VISIT (OUTPATIENT)
Dept: DERMATOLOGY | Facility: CLINIC | Age: 39
End: 2024-07-08
Attending: DERMATOLOGY
Payer: COMMERCIAL

## 2024-07-08 DIAGNOSIS — L70.0 ACNE VULGARIS: Primary | ICD-10-CM

## 2024-07-08 PROCEDURE — 99441 PR PHYSICIAN TELEPHONE EVALUATION 5-10 MIN: CPT | Mod: 93 | Performed by: DERMATOLOGY

## 2024-07-08 RX ORDER — PNV NO.95/FERROUS FUM/FOLIC AC 28MG-0.8MG
1 TABLET ORAL
COMMUNITY
Start: 2024-05-31

## 2024-07-08 RX ORDER — NORTRIPTYLINE HCL 10 MG
CAPSULE ORAL
COMMUNITY
Start: 2024-05-31

## 2024-07-08 RX ORDER — ISOTRETINOIN 10 MG/1
10 CAPSULE ORAL DAILY
Qty: 30 CAPSULE | Refills: 0 | Status: SHIPPED | OUTPATIENT
Start: 2024-07-08

## 2024-07-08 RX ORDER — TIZANIDINE 2 MG/1
TABLET ORAL
COMMUNITY
Start: 2024-05-31

## 2024-07-08 RX ORDER — THIAMINE HCL 100 MG
1 TABLET ORAL
COMMUNITY
Start: 2024-05-31

## 2024-07-08 ASSESSMENT — PAIN SCALES - GENERAL: PAINLEVEL: SEVERE PAIN (6)

## 2024-07-08 NOTE — LETTER
7/8/2024      Sophia De Leon  [address] [place] [address]      Dear Colleague,    Thank you for referring your patient, Sophia De Leon, to the [facility]. Please see a copy of my visit note below.      [facility] Dermatology Note  Encounter Date: Jul 8, 2024  Store-and-Forward and Telephone ([phone]). Location of teledermatologist: [facility].  Start time: 2:28pm. End time: 2:35pm.    Dermatology Problem List:  1. Nodulocystic acne  -flare of R forehead 08/25/22 in dermatomal distribution ddx zoster vs Pityrosporum folliculitis vs pyoderma faciale.  -s/p valtrex without improvement  -bacterial culture with normal jonathan  -swabbed for zoster PCR 08/25/22   2. Hidradenitis suppurativa, Beltran stage II  -Current: , benzoyl peroxide 2.5% wash, minocycline 100mg BID  -Past: oral antibiotics (minocycline and rifampin), Humira (migraines), isotretinoin (dryness, headaches and dizziness),  anakinra prescribed (did not start), ustekinumab (did not start)  - ILK did help the inflammatory on cheek  - Prednisone made her worse and made dizziness worse.  - Last quantiferon gold: 2018  - Hep C ab neg 11/3/20  - HIV neg 11/3/20  - Hep B: surface ab +, surface antigen neg c/w immunity 8/12/18  - CBC last wnl 11/3/20  - BMP wnl 12/27/20  3. 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 Hx of Afib after childbirth   5. Wart, left heel  6. Hx of high-risk HPV  7. Seborrheic dermatitis, scalp  - ketoconazole 2% shampoo, lidex 0.05% solution    ____________________________________________    Assessment & Plan:     # acne/rosacea-improved   - Continue accutane 10 mg daily.  Total cumulative dose is 540 mg.  - Labs reviewed: hCG quantitative, triglycerides, ALT, AST from 7/2/24   --The patient was again counseled on the following: The patient cannot give blood while on isotretinoin or share the drug  Reviewed need to  avoid alcohol on medication. Eye dryness and bloody nose are possible side effects and the patient should seek care of these if they occur. The patient will call for severe flares and seek care along with stopping the drug for SI or mood issues.  Reviewed to call for flares. Reviewed photosensitivity and need for sunscreen. Call if you worsen, avoid skin procedure  -she is abstinent, reviewed meaning and she is compliant  -if acne worsens with drug she understands to call, you would need the prednisone  -use eye drops as per eye doctor  -next labs due sept 8th-  -offered to see in person and inject, she declines     # Hidradenitis Suppurativa, stable  - Clobetasol per Dr. Franco  -clindamycin topical  -on topical spironolactone, tretinoin, hyaluronic acid at night        # Headaches, has hx of migrains with aura- see  - Is seeing neuro  - Last eye pressure check negative and she will reschedule as she has some anxiety regarding this      Procedures Performed:    None    Follow-up: 1 month(s) virtually (telephone with photos), or earlier for new or changing lesions    Staff and Scribe:   Scribe Disclosure:   I, Lyndsay Colmenares, am serving as a scribe to document services personally performed by Danielle Shepard MD based on data collection and the provider's statements to me.     Provider Disclosure:   The documentation recorded by the scribe accurately reflects the services I personally performed and the decisions made by me.    Danielle Shepard MD    Department of Dermatology  [facility]: Phone: [phone], Fax:[phone]  [facility]: Phone: [phone], Fax: [phone]   ____________________________________________    CC: Acne (Patient reports acne is flaring on her cheek.) and Derm Problem (HS, flaring currently on right upper groin and inner thigh area. )    HPI:  Ms. Sophia De Leon is a(n)  38 year old female who presents today as a return patient for Accutane/acne.    Today, patient reports she had vertigo 1 week ago.  Patient has not taken medication recently.  She had GI issues that are normal to her.  Also had recent tooth surgery.  Her last dose was approximately June 28  The patient reports tolerable mucocutaneous dryness, and denies arthralgias, myalgias, depression, suicidal ideation, diarrhea, headache outside of normal, or blurred vision (unless she misses her drops).      Pt reprots skin was clearing. Hs was better too. Pt is still abstinent  Component      Latest Ref Rng 7/2/2024  10:52 AM   Triglycerides      <150 mg/dL 70    Patient Fasting? No    ALT      0 - 50 U/L 17    AST      0 - 45 U/L 18    hCG Quantitative      <5 mIU/mL <1          Patient is otherwise feeling well, without additional skin concerns.    Labs Reviewed:  Component      Latest Ref Rng 7/2/2024  10:52 AM   Triglycerides      <150 mg/dL 70    Patient Fasting? No    ALT      0 - 50 U/L 17    AST      0 - 45 U/L 18    hCG Quantitative      <5 mIU/mL <1          Physical Exam:  Vitals: There were no vitals taken for this visit.  SKIN: Teledermatology photos were reviewed; image quality and interpretability:  acceptable. Image date: 2 days prior to visit  - acneiform nodule on the face  - No other lesions of concern on areas examined.     Medications:  Current Outpatient Medications   Medication Sig Dispense Refill     Acetaminophen (TYLENOL PO) Take 1,000 mg by mouth as needed for mild pain PRN       cetirizine (ZYRTEC) 10 MG tablet Take 10 mg by mouth daily as needed for allergies       clindamycin (CLEOCIN T) 1 % external lotion Apply topically 2 times daily 60 mL 3     clobetasol (TEMOVATE) 0.05 % external ointment Apply to itchy area in groin twice a day as neeeded 60 g 0     clotrimazole (LOTRIMIN) 1 % external cream Apply topically 2 times daily 45 g 3     COLLAGEN PO Take 1-2 tablets by mouth daily . Contains  Calcium + Biotin       diclofenac (ASPERCREME ARTHRITIS PAIN) 1 % topical gel Apply topically as needed (arthritic pain)       ferrous gluconate (FERGON) 324 (38 Fe) MG tablet Take 324 mg by mouth 2-3 times per week. Plus Vitamin C 500 mg chewable when taking.       fluconazole (DIFLUCAN) 150 MG tablet Take 150 mg by mouth once as needed (yeast infection)       fluticasone (FLONASE) 50 MCG/ACT nasal spray Spray 2 sprays in nostril as needed for rhinitis or allergies       IBUPROFEN PO Take 400 mg by mouth as needed for moderate pain PRN       ISOtretinoin (ACCUTANE) 10 MG capsule Take 1 capsule (10 mg) by mouth daily iPLEDGE #: [id number]. May substitute for generic or brand (I.e., Claravis) per insurance requirements. 30 capsule 0     Magnesium Oxide 250 MG tablet Take 1 tablet by mouth daily at 2 pm       meclizine (ANTIVERT) 12.5 MG tablet Take 12.5-25 mg by mouth 3 times daily as needed (vertigo)       nortriptyline (PAMELOR) 10 MG capsule TAKE 1 CAPSULE BY MOUTH AT 8 PM       ondansetron (ZOFRAN ODT) 4 MG ODT tab Place 4 mg under the tongue as needed for nausea (with migraines)       Riboflavin (B-2) 50 MG TABS Take 1 tablet by mouth daily at 2 pm       rizatriptan (MAXALT-MLT) 10 MG ODT Take 10 mg by mouth at onset of headache for migraine       tiZANidine (ZANAFLEX) 2 MG tablet TAKE 1/2 TO 1 TABLET BY MOUTH AT BEDTIME AS NEEDED FOR TENSION HEADACHE       urea (GORMEL) 20 % external cream Apply topically 2 times daily as needed (for feet dryness)       calcium carbonate 500 MG CHEW Take 1-2 tablets by mouth as needed (heartburn) (Patient not taking: Reported on 7/8/2024)       cholecalciferol (VITAMIN D3) 125 mcg (5000 units) capsule Take 250 mcg by mouth every 2nd or 3rd day (Patient not taking: Reported on 7/8/2024)       ketoconazole (NIZORAL) 2 % external shampoo Apply topically once a week (Patient not taking: Reported on 7/8/2024) 120 mL 11     Current Facility-Administered Medications   Medication Dose  Route Frequency Provider Last Rate Last Admin     lidocaine 1% with EPINEPHrine 1:100,000 injection 3 mL  3 mL Intradermal Once Omayra Lerma MD          Past Medical/Surgical History:   Patient Active Problem List   Diagnosis     Acne vulgaris     Hidradenitis suppurativa     Migraine with aura and without status migrainosus, not intractable     No past medical history on file.    CC Danielle Shepard MD  [address]  [place] [address] on close of this encounter.      Teledermatology Nurse Call Patients:     Are you in the [facility] at the time of the encounter? yes    Today's visit will be billed to you and your insurance.    A teledermatology visit is not as thorough as an in-person visit and the quality of the photograph sent may not be of the same quality as that taken by the dermatology clinic.    Annamarie Tineo LPN        Again, thank you for allowing me to participate in the care of your patient.        Sincerely,        Danielle Shepard MD

## 2024-07-08 NOTE — PROGRESS NOTES
Teledermatology Nurse Call Patients:     Are you in the [facility] at the time of the encounter? yes    Today's visit will be billed to you and your insurance.    A teledermatology visit is not as thorough as an in-person visit and the quality of the photograph sent may not be of the same quality as that taken by the dermatology clinic.    Annamarie Tineo LPN

## 2024-07-08 NOTE — PROGRESS NOTES
[facility] Dermatology Note  Encounter Date: Jul 8, 2024  [employer]-and[employer] and Telephone ([phone]). Location of teledermatologist: [facility].  Start time: 2:28pm. End time: 2:35pm.    Dermatology Problem List:  1. Nodulocystic acne  -flare of R forehead 08/25/22 in dermatomal distribution ddx zoster vs Pityrosporum folliculitis vs pyoderma faciale.  -s/p valtrex without improvement  -bacterial culture with normal jonathan  -swabbed for zoster PCR 08/25/22   2. Hidradenitis suppurativa, Beltran stage II  -Current: , benzoyl peroxide 2.5% wash, minocycline 100mg BID  -Past: oral antibiotics (minocycline and rifampin), Humira (migraines), isotretinoin (dryness, headaches and dizziness),  anakinra prescribed (did not start), ustekinumab (did not start)  - ILK did help the inflammatory on cheek  - Prednisone made her worse and made dizziness worse.  - Last quantiferon gold: 2018  - Hep C ab neg 11/3/20  - HIV neg 11/3/20  - Hep B: surface ab +, surface antigen neg c/w immunity 8/12/18  - CBC last wnl 11/3/20  - BMP wnl 12/27/20  3. 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 Hx of Afib after childbirth   5. Wart, left heel  6. Hx of high-risk HPV  7. Seborrheic dermatitis, scalp  - ketoconazole 2% shampoo, lidex 0.05% solution    ____________________________________________    Assessment & Plan:     # acne/rosacea-improved   - Continue accutane 10 mg daily.  Total cumulative dose is 540 mg.  - Labs reviewed: hCG quantitative, triglycerides, ALT, AST from 7/2/24   --The patient was again counseled on the following: The patient cannot give blood while on isotretinoin or share the drug  Reviewed need to avoid alcohol on medication. Eye dryness and bloody nose are possible side effects and the patient should seek care of these if they occur. The patient will call for severe flares and seek care along with stopping the drug for SI  or mood issues.  Reviewed to call for flares. Reviewed photosensitivity and need for sunscreen. Call if you worsen, avoid skin procedure  -she is abstinent, reviewed meaning and she is compliant  -if acne worsens with drug she understands to call, you would need the prednisone  -use eye drops as per eye doctor  -next labs due sept 8th-  -offered to see in person and inject, she declines     # Hidradenitis Suppurativa, stable  - Clobetasol per Dr. Franco  -clindamycin topical  -on topical spironolactone, tretinoin, hyaluronic acid at night        # Headaches, has hx of migrains with aura- see  - Is seeing neuro  - Last eye pressure check negative and she will reschedule as she has some anxiety regarding this      Procedures Performed:    None    Follow-up: 1 month(s) virtually (telephone with photos), or earlier for new or changing lesions    Staff and Scribe:   Scribe Disclosure:   I, Lyndsay Colmenares, am serving as a scribe to document services personally performed by Danielle Shepard MD based on data collection and the provider's statements to me.     Provider Disclosure:   The documentation recorded by the scribe accurately reflects the services I personally performed and the decisions made by me.    Danielle Shepard MD    Department of Dermatology  [facility]: Phone: [phone], Fax:[phone]  [facility]: Phone: [phone], Fax: [phone]   ____________________________________________    CC: Acne (Patient reports acne is flaring on her cheek.) and Derm Problem (HS, flaring currently on right upper groin and inner thigh area. )    HPI:  Ms. Sophia De Leon is a(n) 38 year old female who presents today as a return patient for Accutane/acne.    Today, patient reports she had vertigo 1 week ago.  Patient has not taken medication recently.  She had GI issues that are normal to her.  Also had  recent tooth surgery.  Her last dose was approximately June 28  The patient reports tolerable mucocutaneous dryness, and denies arthralgias, myalgias, depression, suicidal ideation, diarrhea, headache outside of normal, or blurred vision (unless she misses her drops).      Pt reprots skin was clearing. Hs was better too. Pt is still abstinent  Component      Latest Ref Rng 7/2/2024  10:52 AM   Triglycerides      <150 mg/dL 70    Patient Fasting? No    ALT      0 - 50 U/L 17    AST      0 - 45 U/L 18    hCG Quantitative      <5 mIU/mL <1          Patient is otherwise feeling well, without additional skin concerns.    Labs Reviewed:  Component      Latest Ref Rng 7/2/2024  10:52 AM   Triglycerides      <150 mg/dL 70    Patient Fasting? No    ALT      0 - 50 U/L 17    AST      0 - 45 U/L 18    hCG Quantitative      <5 mIU/mL <1          Physical Exam:  Vitals: There were no vitals taken for this visit.  SKIN: Teledermatology photos were reviewed; image quality and interpretability:  acceptable. Image date: 2 days prior to visit  - acneiform nodule on the face  - No other lesions of concern on areas examined.     Medications:  Current Outpatient Medications   Medication Sig Dispense Refill    Acetaminophen (TYLENOL PO) Take 1,000 mg by mouth as needed for mild pain PRN      cetirizine (ZYRTEC) 10 MG tablet Take 10 mg by mouth daily as needed for allergies      clindamycin (CLEOCIN T) 1 % external lotion Apply topically 2 times daily 60 mL 3    clobetasol (TEMOVATE) 0.05 % external ointment Apply to itchy area in groin twice a day as neeeded 60 g 0    clotrimazole (LOTRIMIN) 1 % external cream Apply topically 2 times daily 45 g 3    COLLAGEN PO Take 1-2 tablets by mouth daily . Contains Calcium + Biotin      diclofenac (ASPERCREME ARTHRITIS PAIN) 1 % topical gel Apply topically as needed (arthritic pain)      ferrous gluconate (FERGON) 324 (38 Fe) MG tablet Take 324 mg by mouth 2-3 times per week. Plus Vitamin C 500 mg  chewable when taking.      fluconazole (DIFLUCAN) 150 MG tablet Take 150 mg by mouth once as needed (yeast infection)      fluticasone (FLONASE) 50 MCG/ACT nasal spray Spray 2 sprays in nostril as needed for rhinitis or allergies      IBUPROFEN PO Take 400 mg by mouth as needed for moderate pain PRN      ISOtretinoin (ACCUTANE) 10 MG capsule Take 1 capsule (10 mg) by mouth daily iPLEDGE #: [id number]. May substitute for generic or brand (I.e., Claravis) per insurance requirements. 30 capsule 0    Magnesium Oxide 250 MG tablet Take 1 tablet by mouth daily at 2 pm      meclizine (ANTIVERT) 12.5 MG tablet Take 12.5-25 mg by mouth 3 times daily as needed (vertigo)      nortriptyline (PAMELOR) 10 MG capsule TAKE 1 CAPSULE BY MOUTH AT 8 PM      ondansetron (ZOFRAN ODT) 4 MG ODT tab Place 4 mg under the tongue as needed for nausea (with migraines)      Riboflavin (B-2) 50 MG TABS Take 1 tablet by mouth daily at 2 pm      rizatriptan (MAXALT-MLT) 10 MG ODT Take 10 mg by mouth at onset of headache for migraine      tiZANidine (ZANAFLEX) 2 MG tablet TAKE 1/2 TO 1 TABLET BY MOUTH AT BEDTIME AS NEEDED FOR TENSION HEADACHE      urea (GORMEL) 20 % external cream Apply topically 2 times daily as needed (for feet dryness)      calcium carbonate 500 MG CHEW Take 1-2 tablets by mouth as needed (heartburn) (Patient not taking: Reported on 7/8/2024)      cholecalciferol (VITAMIN D3) 125 mcg (5000 units) capsule Take 250 mcg by mouth every 2nd or 3rd day (Patient not taking: Reported on 7/8/2024)      ketoconazole (NIZORAL) 2 % external shampoo Apply topically once a week (Patient not taking: Reported on 7/8/2024) 120 mL 11     Current Facility-Administered Medications   Medication Dose Route Frequency Provider Last Rate Last Admin    lidocaine 1% with EPINEPHrine 1:100,000 injection 3 mL  3 mL Intradermal Once Omayra Lerma MD          Past Medical/Surgical History:   Patient Active Problem List   Diagnosis    Acne vulgaris     Hidradenitis suppurativa    Migraine with aura and without status migrainosus, not intractable     No past medical history on file.    CC Danielle Shepard MD  [address]  [place] [address] on close of this encounter.     General

## 2024-07-09 DIAGNOSIS — L70.0 ACNE VULGARIS: Primary | ICD-10-CM

## 2024-09-21 ENCOUNTER — HEALTH MAINTENANCE LETTER (OUTPATIENT)
Age: 39
End: 2024-09-21

## 2024-11-08 DIAGNOSIS — B00.1 HERPES LABIALIS: ICD-10-CM

## 2024-11-08 DIAGNOSIS — L73.2 HIDRADENITIS SUPPURATIVA: ICD-10-CM

## 2024-11-08 RX ORDER — CLOBETASOL PROPIONATE 0.5 MG/G
OINTMENT TOPICAL
Qty: 60 G | OUTPATIENT
Start: 2024-11-08

## 2024-11-08 NOTE — TELEPHONE ENCOUNTER
Refill request for Clobetasol Prop 0.05% Ointment 30gm from [facility] pharmacy on [address] in [place], [place].  Rx: [id number]  Tel: [phone]  Fax: [phone]    Louis Emerson on 11/8/2024 at 10:32 AM

## 2024-11-08 NOTE — TELEPHONE ENCOUNTER
"Medication Requested:   Disp Refills Start End JENA   clobetasol (TEMOVATE) 0.05 % external ointment 60 g 0 11/30/2023 -- No   Sig: Apply to itchy area in groin twice a day as neeeded     ----------------------  Last Office Visit : Virtual Visit  7/8/2024  [facility] Office visit:     12/26/2024 4:30 PM (30 min)  [place]   Arrive by:  4:15 PM   RETURN HIDRADENITIS SUPPURA    [facility])   Timmy Franco MD     ----------------------  Per last visit note:  \" Hidradenitis Suppurativa, stable  - Clobetasol per Dr. Franco  Follow-up: 1 month \"      Refill decision: Refill pended and routed to the provider for review/determination due to the following criteria not met:     Medication unable to be refilled by RN due to criteria not met as indicated.     - Pt not seen within past 3 months, next appt >30days out  - Refill refused. Per protocol, patient needs appointment prior to any refills.   Per last visit note, patient's recommended RTC: 1mo            Pass/Fail Protocol Criteria:  Derm-Allergy Protocol - Topical Steroids  Generic name: clobetasol topical   Brand names: Clobex, Clodan, Impoyz, Olux, Temovate, Tovet    Protocol Details: Derm Process #3  -Refill qty to 3 months from last clinic visit   -Refill with or without scheduled appointment, Refuse if over 3 months.    -Contact Clinic Coordinators about appointment only if over 3 mo.                                        "

## 2024-12-26 ENCOUNTER — VIRTUAL VISIT (OUTPATIENT)
Dept: DERMATOLOGY | Facility: CLINIC | Age: 39
End: 2024-12-26
Payer: COMMERCIAL

## 2024-12-26 DIAGNOSIS — L70.0 ACNE VULGARIS: ICD-10-CM

## 2024-12-26 DIAGNOSIS — L21.9 DERMATITIS, SEBORRHEIC: ICD-10-CM

## 2024-12-26 DIAGNOSIS — B00.1 HERPES LABIALIS: ICD-10-CM

## 2024-12-26 DIAGNOSIS — B37.2 CANDIDAL INTERTRIGO: ICD-10-CM

## 2024-12-26 DIAGNOSIS — L73.2 HIDRADENITIS SUPPURATIVA: Primary | ICD-10-CM

## 2024-12-26 DIAGNOSIS — L98.9 CRACKING SKIN: ICD-10-CM

## 2024-12-26 RX ORDER — DAPSONE 75 MG/G
GEL TOPICAL DAILY
Qty: 60 G | Refills: 11 | Status: SHIPPED | OUTPATIENT
Start: 2024-12-26

## 2024-12-26 RX ORDER — UREA 200 MG/G
CREAM TOPICAL 2 TIMES DAILY PRN
Qty: 85 G | Refills: 11 | Status: SHIPPED | OUTPATIENT
Start: 2024-12-26

## 2024-12-26 RX ORDER — TRETINOIN 0.25 MG/G
CREAM TOPICAL AT BEDTIME
Qty: 45 G | Refills: 1 | Status: SHIPPED | OUTPATIENT
Start: 2024-12-26

## 2024-12-26 RX ORDER — FAMOTIDINE 20 MG/1
1 TABLET, FILM COATED ORAL AT BEDTIME
COMMUNITY
Start: 2024-10-25

## 2024-12-26 RX ORDER — SPIRONOLACTONE 25 MG/1
50 TABLET ORAL DAILY
Qty: 270 TABLET | Refills: 2 | Status: SHIPPED | OUTPATIENT
Start: 2024-12-26

## 2024-12-26 RX ORDER — CLOBETASOL PROPIONATE 0.5 MG/G
OINTMENT TOPICAL
Qty: 60 G | Refills: 0 | Status: SHIPPED | OUTPATIENT
Start: 2024-12-26

## 2024-12-26 RX ORDER — KETOCONAZOLE 20 MG/ML
SHAMPOO, SUSPENSION TOPICAL WEEKLY
Qty: 120 ML | Refills: 11 | Status: SHIPPED | OUTPATIENT
Start: 2024-12-26

## 2024-12-26 RX ORDER — CLOTRIMAZOLE 1 %
CREAM (GRAM) TOPICAL 2 TIMES DAILY
Qty: 45 G | Refills: 11 | Status: SHIPPED | OUTPATIENT
Start: 2024-12-26

## 2024-12-26 ASSESSMENT — PAIN SCALES - GENERAL: PAINLEVEL_OUTOF10: SEVERE PAIN (7)

## 2024-12-26 NOTE — PROGRESS NOTES
[facility] Dermatology Note  Encounter Date: Dec 26, 2024  Telephone ([phone] ). Location of teledermatologist: [facility] [place]. Start time 7:20End time: 7:58pm    Dermatology Problem List:  1. Nodulocystic acne  -flare of R forehead 08/25/22 in dermatomal distribution ddx zoster vs Pityrosporum folliculitis vs pyoderma faciale.  -s/p valtrex without improvement  -bacterial culture with normal jonathan  -swabbed for zoster PCR 08/25/22   -  She got lightheaded and faint on oral dapsone. Did help facial acne.  2. Hidradenitis suppurativa, Beltran stage II  -Current: , benzoyl peroxide 2.5% wash, minocycline 100mg BID  -Past: oral antibiotics (minocycline and rifampin), Humira (migraines), isotretinoin (dryness, headaches and dizziness),  anakinra prescribed (did not start), ustekinumab (did not start)  - ILK did help the inflammatory on cheek  - Prednisone made her worse and made dizziness worse.  - Last quantiferon gold: 2018  - Hep C ab neg 11/3/20  - HIV neg 11/3/20  - Hep B: surface ab +, surface antigen neg c/w immunity 8/12/18  - CBC last wnl 11/3/20  - BMP wnl 12/27/20  3. 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 Hx of Afib after childbirth   5. Wart, left heel  6. Hx of high-risk HPV  7. Seborrheic dermatitis, scalp  - ketoconazole 2% shampoo, lidex 0.05% solution  ____________________________________________    Assessment & Plan:   #HS  - Restart spironolactone 25mg, increase to 75mg daily  - Continue benzoyl peroxide wash   - Dapsone gel daily  The longitudinal plan of care for the diagnosis(es)/condition(s) as documented were addressed during this visit. Due to the added complexity in care, I will continue to support Sophia in the subsequent management and with ongoing continuity of care.    # acne/rosacea-improved on isotretinoin  - Stopped isotretinoin in August. Total cumulative dose was 570 mg.She  "notes it helped acne but not HS.   - Restart spironolactone 25mg, increase to 75mg daily  - Continue benzoyl peroxide wash   - Continue tretinoin 0.025 % cream at night.   - Topical dapsone in morning     Follow-up: 12 weeks phone follow-up    Timmy Franco MD, FAAD, FACP     Departments of Internal Medicine and Dermatology  [facility]    More than half of the patient visit was spent counseling the patient on diagnosis/treatment.  Face-to-face time: 40 minutes.       ____________________________________________    CC: Allied Health Visit (Groin and buttocks are currently flaring )    HPI:  Ms. Sophia De Leon is a(n) 39 year old female who presents today for follow-up  for HS.    She uses clindamyicn for acne and HS when flaring and BPO and salicylic acid wash. She did try the tretinoin again on back and face, but she started isotretinoin and s o sh stopped it. She never got the topical spironolactone, tretinoin, hyaluronic acid. Spironolactone previously helped acne, not so much hs. Caused stomach pain.       Patient is otherwise feeling well, without additional skin concerns.    HS Nurse Assessment      8/25/2022     1:56 PM 3/28/2024     2:17 PM 12/26/2024     2:19 PM   Nurse Assessment Data   Over the past 30 days how many old lesions flared back up? 7 2 10   Over the past 30 days how many new lesions did you get? 1 or more \"depending on what is on my head\" 2-3 1   Over the past week, how many dressing changes do you do each day? 3+ 3+ 3+   Over the past week, has your wound drainage been: Severe Mild Moderate   Rate your HS overall from 0 - 10 (0 = no disease, 10 = worst) over the past week:  9 9 9   Rate your pain score from 0 - 10 (0 = no disease, 10 = worst) for the most painful/symptomatic lesion in the past week:  10 - Worst Pain 9 8   Over the past week, how much has HS influenced your quality of life? extremely moderately very much       Medications:  Current Outpatient " Medications   Medication Sig Dispense Refill    Acetaminophen (TYLENOL PO) Take 1,000 mg by mouth as needed for mild pain PRN      benzoyl peroxide 5 % external liquid Apply topically at bedtime.      calcium carbonate 500 MG CHEW Take 1-2 tablets by mouth as needed (heartburn).      cetirizine (ZYRTEC) 10 MG tablet Take 10 mg by mouth daily as needed for allergies      cholecalciferol (VITAMIN D3) 125 mcg (5000 units) capsule Take 250 mcg by mouth. every 2nd or 3rd day      clindamycin (CLEOCIN T) 1 % external lotion Apply topically 2 times daily 60 mL 3    clobetasol (TEMOVATE) 0.05 % external ointment Apply to itchy area in groin twice a day as neeeded 60 g 0    clotrimazole (LOTRIMIN) 1 % external cream Apply topically 2 times daily 45 g 3    diclofenac (ASPERCREME ARTHRITIS PAIN) 1 % topical gel Apply topically as needed (arthritic pain)      famotidine (PEPCID) 20 MG tablet Take 1 tablet by mouth at bedtime.      ferrous gluconate (FERGON) 324 (38 Fe) MG tablet Take 324 mg by mouth 2-3 times per week. Plus Vitamin C 500 mg chewable when taking.      fluconazole (DIFLUCAN) 150 MG tablet Take 150 mg by mouth once as needed (yeast infection)      fluticasone (FLONASE) 50 MCG/ACT nasal spray Spray 2 sprays in nostril as needed for rhinitis or allergies      IBUPROFEN PO Take 400 mg by mouth as needed for moderate pain PRN      Magnesium Oxide 250 MG tablet Take 1 tablet by mouth daily at 2 pm      meclizine (ANTIVERT) 12.5 MG tablet Take 12.5-25 mg by mouth 3 times daily as needed (vertigo)      ondansetron (ZOFRAN ODT) 4 MG ODT tab Place 4 mg under the tongue as needed for nausea (with migraines)      Riboflavin (B-2) 50 MG TABS Take 1 tablet by mouth daily at 2 pm      rizatriptan (MAXALT-MLT) 10 MG ODT Take 10 mg by mouth at onset of headache for migraine      tiZANidine (ZANAFLEX) 2 MG tablet TAKE 1/2 TO 1 TABLET BY MOUTH AT BEDTIME AS NEEDED FOR TENSION HEADACHE      urea (GORMEL) 20 % external cream Apply  topically 2 times daily as needed (for feet dryness)      COLLAGEN PO Take 1-2 tablets by mouth daily . Contains Calcium + Biotin (Patient not taking: Reported on 12/26/2024)      ISOtretinoin (ACCUTANE) 10 MG capsule Take 1 capsule (10 mg) by mouth daily iPLEDGE #: [id number]. May substitute for generic or brand (I.e., Claravis) per insurance requirements. (Patient not taking: Reported on 12/26/2024) 30 capsule 0    ketoconazole (NIZORAL) 2 % external shampoo Apply topically once a week (Patient not taking: Reported on 12/26/2024) 120 mL 11    nortriptyline (PAMELOR) 10 MG capsule TAKE 1 CAPSULE BY MOUTH AT 8 PM (Patient not taking: Reported on 12/26/2024)       Current Facility-Administered Medications   Medication Dose Route Frequency Provider Last Rate Last Admin    lidocaine 1% with EPINEPHrine 1:100,000 injection 3 mL  3 mL Intradermal Once Omayra Lerma MD          Past Medical/Surgical History:   Patient Active Problem List   Diagnosis    Acne vulgaris    Hidradenitis suppurativa    Migraine with aura and without status migrainosus, not intractable     Exam  - Acneiform papules on  rt cheek and upper lip under the nose. Also flaring more on back/chest and buttocks.  - Abscess son rt thigh

## 2024-12-26 NOTE — NURSING NOTE
Dermatology Rooming Note    Sophia De Leon's goals for this visit include:   Chief Complaint   Patient presents with    Allied Health Visit     Groin and buttocks are currently flaring      Seda Mcdonald LPN

## 2024-12-26 NOTE — LETTER
12/26/2024       RE: Sophia De Leon  [address] [facility] [address]     Dear Colleague,    Thank you for referring your patient, Sophia De Leon, to the [facility] [place] at [facility]. Please see a copy of my visit note below.    Formerly [facility] Dermatology Note  Encounter Date: Dec 26, 2024  Telephone ([phone] ). Location of teledermatologist: [facility] [place]. Start time 7:20End time: 7:58pm    Dermatology Problem List:  1. Nodulocystic acne  -flare of R forehead 08/25/22 in dermatomal distribution ddx zoster vs Pityrosporum folliculitis vs pyoderma faciale.  -s/p valtrex without improvement  -bacterial culture with normal jonathan  -swabbed for zoster PCR 08/25/22   -  She got lightheaded and faint on oral dapsone. Did help facial acne.  2. Hidradenitis suppurativa, Beltran stage II  -Current: , benzoyl peroxide 2.5% wash, minocycline 100mg BID  -Past: oral antibiotics (minocycline and rifampin), Humira (migraines), isotretinoin (dryness, headaches and dizziness),  anakinra prescribed (did not start), ustekinumab (did not start)  - ILK did help the inflammatory on cheek  - Prednisone made her worse and made dizziness worse.  - Last quantiferon gold: 2018  - Hep C ab neg 11/3/20  - HIV neg 11/3/20  - Hep B: surface ab +, surface antigen neg c/w immunity 8/12/18  - CBC last wnl 11/3/20  - BMP wnl 12/27/20  3. 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 Hx of Afib after childbirth   5. Wart, left heel  6. Hx of high-risk HPV  7. Seborrheic dermatitis, scalp  - ketoconazole 2% shampoo, lidex 0.05% solution  ____________________________________________    Assessment & Plan:   #HS  - Restart spironolactone 25mg, increase to 75mg daily  - Continue benzoyl peroxide wash   - Dapsone gel daily  The longitudinal plan of care for the  "diagnosis(es)/condition(s) as documented were addressed during this visit. Due to the added complexity in care, I will continue to support Sophia in the subsequent management and with ongoing continuity of care.    # acne/rosacea-improved on isotretinoin  - Stopped isotretinoin in August. Total cumulative dose was 570 mg.She notes it helped acne but not HS.   - Restart spironolactone 25mg, increase to 75mg daily  - Continue benzoyl peroxide wash   - Continue tretinoin 0.025 % cream at night.   - Topical dapsone in morning     Follow-up: 12 weeks phone follow-up    Timmy Franco MD, FAAD, FACP     Departments of Internal Medicine and Dermatology  [facility]    More than half of the patient visit was spent counseling the patient on diagnosis/treatment.  Face-to-face time: 40 minutes.       ____________________________________________    CC: Allied Health Visit (Groin and buttocks are currently flaring )    HPI:  Ms. Sophia De Leon is a(n) 39 year old female who presents today for follow-up  for HS.    She uses clindamyicn for acne and HS when flaring and BPO and salicylic acid wash. She did try the tretinoin again on back and face, but she started isotretinoin and s o sh stopped it. She never got the topical spironolactone, tretinoin, hyaluronic acid. Spironolactone previously helped acne, not so much hs. Caused stomach pain.       Patient is otherwise feeling well, without additional skin concerns.    HS Nurse Assessment      8/25/2022     1:56 PM 3/28/2024     2:17 PM 12/26/2024     2:19 PM   Nurse Assessment Data   Over the past 30 days how many old lesions flared back up? 7 2 10   Over the past 30 days how many new lesions did you get? 1 or more \"depending on what is on my head\" 2-3 1   Over the past week, how many dressing changes do you do each day? 3+ 3+ 3+   Over the past week, has your wound drainage been: Severe Mild Moderate   Rate your HS overall from 0 - 10 (0 = no " disease, 10 = worst) over the past week:  9 9 9   Rate your pain score from 0 - 10 (0 = no disease, 10 = worst) for the most painful/symptomatic lesion in the past week:  10 - Worst Pain 9 8   Over the past week, how much has HS influenced your quality of life? extremely moderately very much       Medications:  Current Outpatient Medications   Medication Sig Dispense Refill     Acetaminophen (TYLENOL PO) Take 1,000 mg by mouth as needed for mild pain PRN       benzoyl peroxide 5 % external liquid Apply topically at bedtime.       calcium carbonate 500 MG CHEW Take 1-2 tablets by mouth as needed (heartburn).       cetirizine (ZYRTEC) 10 MG tablet Take 10 mg by mouth daily as needed for allergies       cholecalciferol (VITAMIN D3) 125 mcg (5000 units) capsule Take 250 mcg by mouth. every 2nd or 3rd day       clindamycin (CLEOCIN T) 1 % external lotion Apply topically 2 times daily 60 mL 3     clobetasol (TEMOVATE) 0.05 % external ointment Apply to itchy area in groin twice a day as neeeded 60 g 0     clotrimazole (LOTRIMIN) 1 % external cream Apply topically 2 times daily 45 g 3     diclofenac (ASPERCREME ARTHRITIS PAIN) 1 % topical gel Apply topically as needed (arthritic pain)       famotidine (PEPCID) 20 MG tablet Take 1 tablet by mouth at bedtime.       ferrous gluconate (FERGON) 324 (38 Fe) MG tablet Take 324 mg by mouth 2-3 times per week. Plus Vitamin C 500 mg chewable when taking.       fluconazole (DIFLUCAN) 150 MG tablet Take 150 mg by mouth once as needed (yeast infection)       fluticasone (FLONASE) 50 MCG/ACT nasal spray Spray 2 sprays in nostril as needed for rhinitis or allergies       IBUPROFEN PO Take 400 mg by mouth as needed for moderate pain PRN       Magnesium Oxide 250 MG tablet Take 1 tablet by mouth daily at 2 pm       meclizine (ANTIVERT) 12.5 MG tablet Take 12.5-25 mg by mouth 3 times daily as needed (vertigo)       ondansetron (ZOFRAN ODT) 4 MG ODT tab Place 4 mg under the tongue as needed  for nausea (with migraines)       Riboflavin (B-2) 50 MG TABS Take 1 tablet by mouth daily at 2 pm       rizatriptan (MAXALT-MLT) 10 MG ODT Take 10 mg by mouth at onset of headache for migraine       tiZANidine (ZANAFLEX) 2 MG tablet TAKE 1/2 TO 1 TABLET BY MOUTH AT BEDTIME AS NEEDED FOR TENSION HEADACHE       urea (GORMEL) 20 % external cream Apply topically 2 times daily as needed (for feet dryness)       COLLAGEN PO Take 1-2 tablets by mouth daily . Contains Calcium + Biotin (Patient not taking: Reported on 12/26/2024)       ISOtretinoin (ACCUTANE) 10 MG capsule Take 1 capsule (10 mg) by mouth daily iPLEDGE #: [id number]. May substitute for generic or brand (I.e., Claravis) per insurance requirements. (Patient not taking: Reported on 12/26/2024) 30 capsule 0     ketoconazole (NIZORAL) 2 % external shampoo Apply topically once a week (Patient not taking: Reported on 12/26/2024) 120 mL 11     nortriptyline (PAMELOR) 10 MG capsule TAKE 1 CAPSULE BY MOUTH AT 8 PM (Patient not taking: Reported on 12/26/2024)       Current Facility-Administered Medications   Medication Dose Route Frequency Provider Last Rate Last Admin     lidocaine 1% with EPINEPHrine 1:100,000 injection 3 mL  3 mL Intradermal Once Omayra Lerma MD          Past Medical/Surgical History:   Patient Active Problem List   Diagnosis     Acne vulgaris     Hidradenitis suppurativa     Migraine with aura and without status migrainosus, not intractable     Exam  - Acneiform papules on  rt cheek and upper lip under the nose. Also flaring more on back/chest and buttocks.  - Abscess son rt thigh      Again, thank you for allowing me to participate in the care of your patient.      Sincerely,    Timmy Franco MD

## 2024-12-27 NOTE — PATIENT INSTRUCTIONS
HS and Acne  - Start spironolactione 25mg daily. Increase by 25mg every week until you are at 75mg daily.   - Dapsone gel to face and body in morning  - Tretinoin 0.02% cream at night  face, chest and back  - Continue benzoyl peroxixe wash daily    Refilled ketoconazole shampoo, urea cream, clortimazole cream, and clobetasol ointment

## 2024-12-29 ENCOUNTER — TELEPHONE (OUTPATIENT)
Dept: DERMATOLOGY | Facility: CLINIC | Age: 39
End: 2024-12-29
Payer: COMMERCIAL

## 2024-12-29 NOTE — TELEPHONE ENCOUNTER
Prior Authorization Retail Medication Request    Medication/Dose: dapsone (ACZONE) 7.5 % gel  Diagnosis and ICD code (if different than what is on RX):    New/renewal/insurance change PA/secondary ins. PA: new  Previously Tried and Failed:  see chart  Rationale:  see chart    Insurance   Primary: [facility]  Insurance ID:  [id number]     Clinic Information  Preferred routing pool for dept communication: [facility]

## 2024-12-30 NOTE — TELEPHONE ENCOUNTER
Retail Pharmacy Prior Authorization Team   Phone: [phone]    PA Initiation    Medication: DAPSONE 7.5 % EX GEL  Insurance Company: [employer] - Phone [phone] Fax [phone]  Pharmacy Filling the Rx: [id number] - [place] - [address] [address]  Filling Pharmacy Phone: [phone]  Filling Pharmacy Fax:    Start Date: 12/30/2024    [id number]

## 2025-01-02 NOTE — TELEPHONE ENCOUNTER
Prior Authorization Approval    Medication: DAPSONE 7.5 % EX GEL  Authorization Effective Date: 12/31/2024  Authorization Expiration Date: 12/31/2025  Approved Dose/Quantity:   Reference #:     Insurance Company: [employer] - Phone [phone] Fax [phone]  Expected CoPay: $    CoPay Card Available:      Financial Assistance Needed:   Which [facility] the prescription[id number] - [place] - [address] [address] [facility]  Pharmacy Notified: Yes  Patient Notified: **Instructed pharmacy to notify patient when script is ready to /ship.**

## 2025-01-12 ENCOUNTER — HEALTH MAINTENANCE LETTER (OUTPATIENT)
Age: 40
End: 2025-01-12

## 2025-01-21 ENCOUNTER — TELEPHONE (OUTPATIENT)
Dept: DERMATOLOGY | Facility: CLINIC | Age: 40
End: 2025-01-21
Payer: COMMERCIAL

## 2025-01-21 NOTE — TELEPHONE ENCOUNTER
1/21 Patient confirmed scheduled appointment:  Date: 4/10/25  Time: 4:30pm  Visit type: Return HS  Provider: Salvador  Location: [facility] (Phone visit)  Testing/imaging: N/A  Additional notes: This is a phone visit

## 2025-02-26 ENCOUNTER — TELEPHONE (OUTPATIENT)
Dept: DERMATOLOGY | Facility: CLINIC | Age: 40
End: 2025-02-26
Payer: COMMERCIAL

## 2025-02-26 NOTE — TELEPHONE ENCOUNTER
[facility]    Phone Message    May a detailed message be left on voicemail: yes     Reason for Call: Symptoms or Concerns     If patient has red-flag symptoms, warm transfer to triage line    Current symptom or concern: Boil/skin lesion  pt is sudden pain in the tailbone area, pt has trouble sitting. pt said there is a red line near tailbone area. looks like a boil starting. pt does not do well antibiotics and is asking for a cream etc.   Pt has been icing and heating this area.   Is there a topical for pt?     Symptoms have been present for:  2 week(s)    Has patient previously been seen for this? Yes    By : MIMA Way at [facility]    Date: 02/21/25 - pt had CT and nothing showed    Are there any new or worsening symptoms? Yes: This is getting worse - seems to be growing and has more redness    Action Taken: Message routed to:  [facility] Derm    Travel Screening: Not Applicable     Date of Service:

## 2025-02-26 NOTE — TELEPHONE ENCOUNTER
Lvm letting pt know to try warm packs as well. Writer did not see any topicals listed for flares. Will route to MATHIEU in providers absence.

## 2025-04-10 ENCOUNTER — VIRTUAL VISIT (OUTPATIENT)
Dept: DERMATOLOGY | Facility: CLINIC | Age: 40
End: 2025-04-10
Attending: DERMATOLOGY
Payer: COMMERCIAL

## 2025-04-10 DIAGNOSIS — L73.2 HIDRADENITIS SUPPURATIVA: Primary | ICD-10-CM

## 2025-04-10 DIAGNOSIS — B00.1 HERPES LABIALIS: ICD-10-CM

## 2025-04-10 RX ORDER — CLOBETASOL PROPIONATE 0.5 MG/G
OINTMENT TOPICAL
Qty: 60 G | Refills: 0 | Status: SHIPPED | OUTPATIENT
Start: 2025-04-10

## 2025-04-10 RX ORDER — RESORCINOL
POWDER (GRAM) MISCELLANEOUS
Qty: 30 G | Refills: 11 | Status: SHIPPED | OUTPATIENT
Start: 2025-04-10

## 2025-04-10 ASSESSMENT — PAIN SCALES - GENERAL: PAINLEVEL_OUTOF10: SEVERE PAIN (8)

## 2025-04-10 NOTE — PATIENT INSTRUCTIONS
Acne/HS  - Restart spironolactone 25mg, increase to 75mg daily (one or twice a day if getting light headed)  - Continue benzoyl peroxide wash folr face and body  - Dapsone gel daily on face and body   - Flares (these only for body)   Resorcinol 15% cream twice a day as needed ([facility])  [facility]  Address: [address] [place] [address], [place] [address]  Phone: [phone]    Clobetasol ointment twice a day as needed for itching

## 2025-04-10 NOTE — LETTER
4/10/2025       RE: Sophia De Leon  [address] [facility] [address]     Dear Colleague,    Thank you for referring your patient, Sophia De Leon, to the [facility] [place] at [facility]. Please see a copy of my visit note below.    [facility] Dermatology Note  Encounter Date: Apr 10, 2025  Telephone ([phone]). Location of teledermatologist: [facility] [place]. Start time: 900. End time: 9:35pm    Virtual Visit Details  Type of service:  Telephone Visit   Phone call duration:  minutes   Originating Location (pt. Location): Home    Distant Location (provider location):  On-site  Telephone visit completed due to the patient did not consent to a video visit.    Dermatology Problem List:  1. Nodulocystic acne  -flare of R forehead 08/25/22 in dermatomal distribution ddx zoster vs Pityrosporum folliculitis vs pyoderma faciale.  -s/p valtrex without improvement  -bacterial culture with normal jonathan  -swabbed for zoster PCR 08/25/22   -  She got lightheaded and faint on oral dapsone. Did help facial acne.  2. Hidradenitis suppurativa  - Current: spironolactone 75 mg QD, benzoyl peroxide 2.5% wash, dapsone gel QD  - Past Tx:   - oral antibiotics: minocycline, rifampin  - biologics: Humira (migraines),  ustekinumab (did not start)  - retinoids: isotretinoin (dryness, headaches and dizziness)  - ILK did help the inflammatory on cheek  - Prednisone made her worse and made dizziness worse  - Labs:  - Last quantiferon gold: 2018  - Hep C ab neg 11/3/20  - HIV neg 11/3/20  - Hep B: surface ab +, surface antigen neg c/w immunity 8/12/18  - CBC last wnl 11/3/20  - BMP wnl 12/27/20  3. 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 Hx of Afib after childbirth   5. Wart, left heel  6. Hx of high-risk HPV  7. Seborrheic dermatitis, scalp  - ketoconazole 2% shampoo,  lidex 0.05% solution  ____________________________________________    Assessment & Plan:     # Hidradenitis suppurativa/Acne/Rosacea  Maintenance/Prevention Plan:  - Restart spironolactone 25mg, increase to 75mg daily  - Continue benzoyl peroxide wash   - Dapsone gel daily  The longitudinal plan of care for the diagnosis(es)/condition(s) as documented were addressed during this visit. Due to the added complexity in care, I will continue to support Sophia in the subsequent management and with ongoing continuity of care.    Follow-up: 12 weeks via phone    Timmy Franco MD, FAAD, FACP     Departments of Internal Medicine and Dermatology  [facility]    ____________________________________________    CC: Derm Problem (Flaring in perineum area and under left breast)    HPI:  Ms. Sophia De Leon is a(n) 39 year old female who presents today as a return patient for HS    Patient was last seen by me virtually on 12/26/24.     She developed pain in tailbone area. CT lower back with no findings. She was given doxycyline but she wanted to hold off on that. She tried the dapsone gel and washes and heat packs and  it finally opened and drained. Pain is better. Was planning on restarting spironolactone 25mg, and increase to 75mg daily.  Never started spironolactone. She got the flu and developed several health problems, including dry eyes, GI problems. She is now taking antiacids. She is having worsening migraines too.     Groin and thighs have been chronically active but stable.   New area on left breast growing size, but hasn't drained.     Patient is otherwise feeling well, without additional skin concerns.    HS Nurse Assessment      3/28/2024     2:17 PM 12/26/2024     2:19 PM 4/10/2025     3:18 PM   Nurse Assessment Data   Over the past 30 days how many old lesions flared back up? 2 10 5   Over the past 30 days how many new lesions did you get? 2-3 1 2   Over the past week, how many dressing  changes do you do each day? 3+ 3+ 0   Over the past week, has your wound drainage been: Mild Moderate Moderate   Rate your HS overall from 0 - 10 (0 = no disease, 10 = worst) over the past week:  9 9 9   Rate your pain score from 0 - 10 (0 = no disease, 10 = worst) for the most painful/symptomatic lesion in the past week:  9 8 8   Over the past week, how much has HS influenced your quality of life? moderately very much extremely       Medications:  Current Outpatient Medications   Medication Sig Dispense Refill     Acetaminophen (TYLENOL PO) Take 1,000 mg by mouth as needed for mild pain PRN       benzoyl peroxide 5 % external liquid Apply topically daily. 236 mL 11     calcium carbonate 500 MG CHEW Take 1-2 tablets by mouth as needed (heartburn).       cetirizine (ZYRTEC) 10 MG tablet Take 10 mg by mouth daily as needed for allergies       cholecalciferol (VITAMIN D3) 125 mcg (5000 units) capsule Take 250 mcg by mouth. every 2nd or 3rd day       clobetasol (TEMOVATE) 0.05 % external ointment Apply to itchy area in groin twice a day as neeeded 60 g 0     clotrimazole (LOTRIMIN) 1 % external cream Apply topically 2 times daily. 45 g 11     clotrimazole (LOTRIMIN) 1 % external cream Apply topically 2 times daily 45 g 3     COLLAGEN PO Take 1-2 tablets by mouth daily. . Contains Calcium + Biotin       dapsone (ACZONE) 7.5 % gel Apply topically daily. 60 g 11     diclofenac (ASPERCREME ARTHRITIS PAIN) 1 % topical gel Apply topically as needed (arthritic pain)       famotidine (PEPCID) 20 MG tablet Take 1 tablet by mouth at bedtime.       ferrous gluconate (FERGON) 324 (38 Fe) MG tablet Take 324 mg by mouth 2-3 times per week. Plus Vitamin C 500 mg chewable when taking.       fluticasone (FLONASE) 50 MCG/ACT nasal spray Spray 2 sprays in nostril as needed for rhinitis or allergies       IBUPROFEN PO Take 400 mg by mouth as needed for moderate pain PRN       ketoconazole (NIZORAL) 2 % external shampoo Apply topically once  a week. 120 mL 11     Magnesium Oxide 250 MG tablet Take 1 tablet by mouth daily at 2 pm       meclizine (ANTIVERT) 12.5 MG tablet Take 12.5-25 mg by mouth 3 times daily as needed (vertigo)       ondansetron (ZOFRAN ODT) 4 MG ODT tab Place 4 mg under the tongue as needed for nausea (with migraines)       Riboflavin (B-2) 50 MG TABS Take 1 tablet by mouth daily at 2 pm       rizatriptan (MAXALT-MLT) 10 MG ODT Take 10 mg by mouth at onset of headache for migraine       spironolactone (ALDACTONE) 25 MG tablet Take 2 tablets (50 mg) by mouth daily. 270 tablet 2     tiZANidine (ZANAFLEX) 2 MG tablet TAKE 1/2 TO 1 TABLET BY MOUTH AT BEDTIME AS NEEDED FOR TENSION HEADACHE       tretinoin (RETIN-A) 0.025 % external cream Apply topically at bedtime. 45 g 1     urea (GORMEL) 20 % external cream Apply topically 2 times daily as needed (for feet dryness). 85 g 11     nortriptyline (PAMELOR) 10 MG capsule TAKE 1 CAPSULE BY MOUTH AT 8 PM (Patient not taking: Reported on 4/10/2025)       Current Facility-Administered Medications   Medication Dose Route Frequency Provider Last Rate Last Admin     lidocaine 1% with EPINEPHrine 1:100,000 injection 3 mL  3 mL Intradermal Once Omayra Lerma MD          Past Medical/Surgical History:   Patient Active Problem List   Diagnosis     Acne vulgaris     Hidradenitis suppurativa     Migraine with aura and without status migrainosus, not intractable     CC Timmy Franco MD  [address] [place] [address] on close of this encounter.      Again, thank you for allowing me to participate in the care of your patient.      Sincerely,    Timmy Franco MD

## 2025-04-10 NOTE — NURSING NOTE
Dermatology Rooming Note    Sophia De Leon's goals for this visit include:   Chief Complaint   Patient presents with    Derm Problem     Flaring in perineum area and under left breast     Seda Mcdonald LPN

## 2025-04-10 NOTE — PROGRESS NOTES
[facility] Dermatology Note  Encounter Date: Apr 10, 2025  Telephone ([phone]). Location of teledermatologist: [facility]ERMATOLOGY [facility] [place]. Start time: 900. End time: 9:35pm    Virtual Visit Details  Type of service:  Telephone Visit   Phone call duration:  minutes   Originating Location (pt. Location): Home    Distant Location (provider location):  On-site  Telephone visit completed due to the patient did not consent to a video visit.    Dermatology Problem List:  1. Nodulocystic acne  -flare of R forehead 08/25/22 in dermatomal distribution ddx zoster vs Pityrosporum folliculitis vs pyoderma faciale.  -s/p valtrex without improvement  -bacterial culture with normal jonathan  -swabbed for zoster PCR 08/25/22   -  She got lightheaded and faint on oral dapsone. Did help facial acne.  2. Hidradenitis suppurativa  - Current: spironolactone 75 mg QD, benzoyl peroxide 2.5% wash, dapsone gel QD  - Past Tx:   - oral antibiotics: minocycline, rifampin  - biologics: Humira (migraines),  ustekinumab (did not start)  - retinoids: isotretinoin (dryness, headaches and dizziness)  - ILK did help the inflammatory on cheek  - Prednisone made her worse and made dizziness worse  - Labs:  - Last quantiferon gold: 2018  - Hep C ab neg 11/3/20  - HIV neg 11/3/20  - Hep B: surface ab +, surface antigen neg c/w immunity 8/12/18  - CBC last wnl 11/3/20  - BMP wnl 12/27/20  3. 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 Hx of Afib after childbirth   5. Wart, left heel  6. Hx of high-risk HPV  7. Seborrheic dermatitis, scalp  - ketoconazole 2% shampoo, lidex 0.05% solution  ____________________________________________    Assessment & Plan:     # Hidradenitis suppurativa/Acne/Rosacea  Maintenance/Prevention Plan:  - Restart spironolactone 25mg, increase to 75mg daily  - Continue benzoyl peroxide wash   - Dapsone gel daily  The longitudinal plan of care  for the diagnosis(es)/condition(s) as documented were addressed during this visit. Due to the added complexity in care, I will continue to support Sophia in the subsequent management and with ongoing continuity of care.    Follow-up: 12 weeks via phone    Timmy Franco MD, FAAD, FACP     Departments of Internal Medicine and Dermatology  [facility]    ____________________________________________    CC: Derm Problem (Flaring in perineum area and under left breast)    HPI:  Ms. Sophia De Leon is a(n) 39 year old female who presents today as a return patient for HS    Patient was last seen by me virtually on 12/26/24.     She developed pain in tailbone area. CT lower back with no findings. She was given doxycyline but she wanted to hold off on that. She tried the dapsone gel and washes and heat packs and  it finally opened and drained. Pain is better. Was planning on restarting spironolactone 25mg, and increase to 75mg daily.  Never started spironolactone. She got the flu and developed several health problems, including dry eyes, GI problems. She is now taking antiacids. She is having worsening migraines too.     Groin and thighs have been chronically active but stable.   New area on left breast growing size, but hasn't drained.     Patient is otherwise feeling well, without additional skin concerns.    HS Nurse Assessment      3/28/2024     2:17 PM 12/26/2024     2:19 PM 4/10/2025     3:18 PM   Nurse Assessment Data   Over the past 30 days how many old lesions flared back up? 2 10 5   Over the past 30 days how many new lesions did you get? 2-3 1 2   Over the past week, how many dressing changes do you do each day? 3+ 3+ 0   Over the past week, has your wound drainage been: Mild Moderate Moderate   Rate your HS overall from 0 - 10 (0 = no disease, 10 = worst) over the past week:  9 9 9   Rate your pain score from 0 - 10 (0 = no disease, 10 = worst) for the most painful/symptomatic  lesion in the past week:  9 8 8   Over the past week, how much has HS influenced your quality of life? moderately very much extremely       Medications:  Current Outpatient Medications   Medication Sig Dispense Refill    Acetaminophen (TYLENOL PO) Take 1,000 mg by mouth as needed for mild pain PRN      benzoyl peroxide 5 % external liquid Apply topically daily. 236 mL 11    calcium carbonate 500 MG CHEW Take 1-2 tablets by mouth as needed (heartburn).      cetirizine (ZYRTEC) 10 MG tablet Take 10 mg by mouth daily as needed for allergies      cholecalciferol (VITAMIN D3) 125 mcg (5000 units) capsule Take 250 mcg by mouth. every 2nd or 3rd day      clobetasol (TEMOVATE) 0.05 % external ointment Apply to itchy area in groin twice a day as neeeded 60 g 0    clotrimazole (LOTRIMIN) 1 % external cream Apply topically 2 times daily. 45 g 11    clotrimazole (LOTRIMIN) 1 % external cream Apply topically 2 times daily 45 g 3    COLLAGEN PO Take 1-2 tablets by mouth daily. . Contains Calcium + Biotin      dapsone (ACZONE) 7.5 % gel Apply topically daily. 60 g 11    diclofenac (ASPERCREME ARTHRITIS PAIN) 1 % topical gel Apply topically as needed (arthritic pain)      famotidine (PEPCID) 20 MG tablet Take 1 tablet by mouth at bedtime.      ferrous gluconate (FERGON) 324 (38 Fe) MG tablet Take 324 mg by mouth 2-3 times per week. Plus Vitamin C 500 mg chewable when taking.      fluticasone (FLONASE) 50 MCG/ACT nasal spray Spray 2 sprays in nostril as needed for rhinitis or allergies      IBUPROFEN PO Take 400 mg by mouth as needed for moderate pain PRN      ketoconazole (NIZORAL) 2 % external shampoo Apply topically once a week. 120 mL 11    Magnesium Oxide 250 MG tablet Take 1 tablet by mouth daily at 2 pm      meclizine (ANTIVERT) 12.5 MG tablet Take 12.5-25 mg by mouth 3 times daily as needed (vertigo)      ondansetron (ZOFRAN ODT) 4 MG ODT tab Place 4 mg under the tongue as needed for nausea (with migraines)      Riboflavin  (B-2) 50 MG TABS Take 1 tablet by mouth daily at 2 pm      rizatriptan (MAXALT-MLT) 10 MG ODT Take 10 mg by mouth at onset of headache for migraine      spironolactone (ALDACTONE) 25 MG tablet Take 2 tablets (50 mg) by mouth daily. 270 tablet 2    tiZANidine (ZANAFLEX) 2 MG tablet TAKE 1/2 TO 1 TABLET BY MOUTH AT BEDTIME AS NEEDED FOR TENSION HEADACHE      tretinoin (RETIN-A) 0.025 % external cream Apply topically at bedtime. 45 g 1    urea (GORMEL) 20 % external cream Apply topically 2 times daily as needed (for feet dryness). 85 g 11    nortriptyline (PAMELOR) 10 MG capsule TAKE 1 CAPSULE BY MOUTH AT 8 PM (Patient not taking: Reported on 4/10/2025)       Current Facility-Administered Medications   Medication Dose Route Frequency Provider Last Rate Last Admin    lidocaine 1% with EPINEPHrine 1:100,000 injection 3 mL  3 mL Intradermal Once Omayra Lerma MD          Past Medical/Surgical History:   Patient Active Problem List   Diagnosis    Acne vulgaris    Hidradenitis suppurativa    Migraine with aura and without status migrainosus, not intractable     CC Timmy Franco MD  [address] [place] [address] on close of this encounter.

## 2025-07-10 ENCOUNTER — VIRTUAL VISIT (OUTPATIENT)
Dept: DERMATOLOGY | Facility: CLINIC | Age: 40
End: 2025-07-10
Attending: DERMATOLOGY
Payer: COMMERCIAL

## 2025-07-10 DIAGNOSIS — L73.2 HIDRADENITIS SUPPURATIVA: ICD-10-CM

## 2025-07-10 DIAGNOSIS — L70.0 ACNE VULGARIS: Primary | ICD-10-CM

## 2025-07-10 ASSESSMENT — PAIN SCALES - GENERAL: PAINLEVEL_OUTOF10: MODERATE PAIN (6)

## 2025-07-10 NOTE — LETTER
7/10/2025       RE: Sophia De Leon  [address] [address]     Dear Colleague,    Thank you for referring your patient, Sophia De Leon, to the [facility]MATOLOG[facility] [place] [facility]. Please see a copy of my visit note below.    [facility] Dermatology Note  Encounter Date: Jul 10, 2025  Telephone ([phone] ). Location of teledermatologist: [facility] [place]. Start time: 9:30pm. End time: 9:52pm    Virtual Visit Details    Type of service:  Telephone Visit   Phone call duration: 22 minutes   Originating Location (pt. Location): Home    Distant Location (provider location):  On-site  Telephone visit completed due to the patient did not consent to a video visit.     Dermatology Problem List:  1. Nodulocystic acne  -flare of R forehead 08/25/22 in dermatomal distribution ddx zoster vs Pityrosporum folliculitis vs pyoderma faciale.  -s/p valtrex without improvement  -bacterial culture with normal jonathan  -swabbed for zoster PCR 08/25/22   -  She got lightheaded and faint on oral dapsone. Did help facial acne.  2. Hidradenitis suppurativa  - Current: spironolactone 75 mg QD, benzoyl peroxide 2.5% wash, dapsone gel QD  - Past Tx:   - oral antibiotics: minocycline, rifampin  - biologics: Humira (migraines),  ustekinumab (did not start)  - retinoids: isotretinoin (dryness, headaches and dizziness)  - ILK did help the inflammatory on cheek  - Prednisone made her worse and made dizziness worse  - Labs:  - Last quantiferon gold: 2018  - Hep C ab neg 11/3/20  - HIV neg 11/3/20  - Hep B: surface ab +, surface antigen neg c/w immunity 8/12/18  - CBC last wnl 11/3/20  - BMP wnl 12/27/20  3. 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 Hx of Afib after childbirth   5. Wart, left heel  6. Hx of high-risk HPV  7. Seborrheic dermatitis, scalp  - ketoconazole 2%  "shampoo, lidex 0.05% solution    ____________________________________________    Assessment & Plan:   # Hidradenitis suppurativa/Acne/Rosacea  Maintenance/Prevention Plan:  The patient was started on spironolactone during her last visit. She has tolerated 25 mg every day okay, but she experiences weakness and lightheadedness when she tries to increase the dose to 75 mg. Will try to increase to 25mg twice a day. Will try to get topical clastoterone gel approved twice a day for both acne. OK to continue dapsone twice a day top affected areas as tolerated.    - Continue benzoyl peroxide wash   - Dapsone gel daily until starting oral dapsone  The longitudinal plan of care for the diagnosis(es)/condition(s) as documented were addressed during this visit. Due to the added complexity in care, I will continue to support Sophia in the subsequent management and with ongoing continuity of care.    Follow-up: 12 weeks    Timmy Franco MD, FAAD, FACP     Departments of Internal Medicine and Dermatology  [facility]      ____________________________________________    CC: Derm Problem (HS: Flaring in groin/Inner thighs: \"raw and opening\")    HPI:  Ms. Sophia De Leon is a(n) 39 year old female who presents today for follow-up  for HS.  Last seen at a virtual visit on 4/10/25 by me, at which time the patient restarted spironolactone (with goal dose of 75 mg every day), continued BPO wash, and continued dapsone gel     The patient notes that she has had numerous other health problems since she was last seen by dermatology. These include: bilateral TMJ pain, migraines, and iron-deficiency anemia.     Today, the patient states she has had quite a few flares since her last visits. She also notes two tender pilonidal cysts in her gluteal cleft. Her acne has been worse as well. She has been using the spironolactone, though she has only been able to tolerate 25 mg daily, as opposed to the goal dose of 75 " mg. She states she gets lightheaded and weak when using 75 mg. She has also been using dapsone gel and benzoyl peroxide wash daily. She has not been able to get the resorcinol from the specialty pharmacy. The patient is interested in discussing oral dapsone.     Patient is otherwise feeling well, without additional skin concerns.    HS Nurse Assessment        12/26/2024     2:19 PM 4/10/2025     3:18 PM 7/10/2025     2:43 PM   Nurse Assessment Data   Over the past 30 days how many old lesions flared back up? 10 5 4   Over the past 30 days how many new lesions did you get? 1 2 5   Over the past week, how many dressing changes do you do each day? 3+ 0 2   Over the past week, has your wound drainage been: Moderate Moderate Moderate   Rate your HS overall from 0 - 10 (0 = no disease, 10 = worst) over the past week:  9 9 8   Rate your pain score from 0 - 10 (0 = no disease, 10 = worst) for the most painful/symptomatic lesion in the past week:  8 8 8   Over the past week, how much has HS influenced your quality of life? very much extremely extremely     Labs Reviewed:  Last CBC, BMP, and LFTs from 6/17 reviewed and unremarkable.    Medications:  Current Outpatient Medications   Medication Sig Dispense Refill     Acetaminophen (TYLENOL PO) Take 1,000 mg by mouth as needed for mild pain PRN       benzoyl peroxide 5 % external liquid Apply topically daily. 236 mL 11     calcium carbonate 500 MG CHEW Take 1-2 tablets by mouth as needed (heartburn).       cetirizine (ZYRTEC) 10 MG tablet Take 10 mg by mouth daily as needed for allergies       cholecalciferol (VITAMIN D3) 125 mcg (5000 units) capsule Take 250 mcg by mouth. every 2nd or 3rd day       clobetasol (TEMOVATE) 0.05 % external ointment Apply to itchy area in groin twice a day as neeeded 60 g 0     clotrimazole (LOTRIMIN) 1 % external cream Apply topically 2 times daily. 45 g 11     clotrimazole (LOTRIMIN) 1 % external cream Apply topically 2 times daily 45 g 3      COLLAGEN PO Take 1-2 tablets by mouth daily. . Contains Calcium + Biotin       dapsone (ACZONE) 7.5 % gel Apply topically daily. 60 g 11     diclofenac (ASPERCREME ARTHRITIS PAIN) 1 % topical gel Apply topically as needed (arthritic pain)       famotidine (PEPCID) 20 MG tablet Take 1 tablet by mouth at bedtime.       fluticasone (FLONASE) 50 MCG/ACT nasal spray Spray 2 sprays in nostril as needed for rhinitis or allergies       IBUPROFEN PO Take 400 mg by mouth as needed for moderate pain PRN       ketoconazole (NIZORAL) 2 % external shampoo Apply topically once a week. 120 mL 11     Magnesium Oxide 250 MG tablet Take 1 tablet by mouth daily at 2 pm       meclizine (ANTIVERT) 12.5 MG tablet Take 12.5-25 mg by mouth 3 times daily as needed (vertigo)       ondansetron (ZOFRAN ODT) 4 MG ODT tab Place 4 mg under the tongue as needed for nausea (with migraines)       Riboflavin (B-2) 50 MG TABS Take 1 tablet by mouth daily at 2 pm       rizatriptan (MAXALT-MLT) 10 MG ODT Take 10 mg by mouth at onset of headache for migraine       spironolactone (ALDACTONE) 25 MG tablet Take 2 tablets (50 mg) by mouth daily. 270 tablet 2     tiZANidine (ZANAFLEX) 2 MG tablet TAKE 1/2 TO 1 TABLET BY MOUTH AT BEDTIME AS NEEDED FOR TENSION HEADACHE       urea (GORMEL) 20 % external cream Apply topically 2 times daily as needed (for feet dryness). 85 g 11     ferrous gluconate (FERGON) 324 (38 Fe) MG tablet Take 324 mg by mouth 2-3 times per week. Plus Vitamin C 500 mg chewable when taking. (Patient not taking: Reported on 7/10/2025)       nortriptyline (PAMELOR) 10 MG capsule TAKE 1 CAPSULE BY MOUTH AT 8 PM (Patient not taking: Reported on 7/10/2025)       Resorcinol POWD Resorcinol 15% in vaseline twice a day as needed for flares (Patient not taking: Reported on 7/10/2025) 30 g 11     tretinoin (RETIN-A) 0.025 % external cream Apply topically at bedtime. (Patient not taking: Reported on 7/10/2025) 45 g 1     No current facility-administered  medications for this visit.      Past Medical/Surgical History:   Patient Active Problem List   Diagnosis     Acne vulgaris     Hidradenitis suppurativa     Migraine with aura and without status migrainosus, not intractable     CC Timmy Franco MD  [address] [place] [address] on close of this encounter.     Again, thank you for allowing me to participate in the care of your patient.      Sincerely,    Timmy Franco MD

## 2025-07-10 NOTE — NURSING NOTE
"Dermatology Rooming Note    Sophia De Leon's goals for this visit include:   Chief Complaint   Patient presents with    Derm Problem     HS: Flaring in groin  Inner thighs: \"raw and opening\"     Seda Mcdonald LPN    "

## 2025-07-10 NOTE — PROGRESS NOTES
[facility] Dermatology Note  Encounter Date: Jul 10, 2025  Telephone ([phone] ). Location of teledermatologist: [facility]ERMATOLOG[facility] [place]. Start time: 9:30pm. End time: 9:52pm    Virtual Visit Details    Type of service:  Telephone Visit   Phone call duration: 22 minutes   Originating Location (pt. Location): Home    Distant Location (provider location):  On-site  Telephone visit completed due to the patient did not consent to a video visit.     Dermatology Problem List:  1. Nodulocystic acne  -flare of R forehead 08/25/22 in dermatomal distribution ddx zoster vs Pityrosporum folliculitis vs pyoderma faciale.  -s/p valtrex without improvement  -bacterial culture with normal jonathan  -swabbed for zoster PCR 08/25/22   -  She got lightheaded and faint on oral dapsone. Did help facial acne.  2. Hidradenitis suppurativa  - Current: spironolactone 75 mg QD, benzoyl peroxide 2.5% wash, dapsone gel QD  - Past Tx:   - oral antibiotics: minocycline, rifampin  - biologics: Humira (migraines),  ustekinumab (did not start)  - retinoids: isotretinoin (dryness, headaches and dizziness)  - ILK did help the inflammatory on cheek  - Prednisone made her worse and made dizziness worse  - Labs:  - Last quantiferon gold: 2018  - Hep C ab neg 11/3/20  - HIV neg 11/3/20  - Hep B: surface ab +, surface antigen neg c/w immunity 8/12/18  - CBC last wnl 11/3/20  - BMP wnl 12/27/20  3. 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 Hx of Afib after childbirth   5. Wart, left heel  6. Hx of high-risk HPV  7. Seborrheic dermatitis, scalp  - ketoconazole 2% shampoo, lidex 0.05% solution    ____________________________________________    Assessment & Plan:   # Hidradenitis suppurativa/Acne/Rosacea  Maintenance/Prevention Plan:  The patient was started on spironolactone during her last visit. She has tolerated 25 mg every day okay, but she experiences weakness  "and lightheadedness when she tries to increase the dose to 75 mg. Will try to increase to 25mg twice a day. Will try to get topical clastoterone gel approved twice a day for both acne. OK to continue dapsone twice a day top affected areas as tolerated.    - Continue benzoyl peroxide wash   - Dapsone gel daily until starting oral dapsone  The longitudinal plan of care for the diagnosis(es)/condition(s) as documented were addressed during this visit. Due to the added complexity in care, I will continue to support Sophia in the subsequent management and with ongoing continuity of care.    Follow-up: 12 weeks    Timmy Franco MD, FAAD, FACP     Departments of Internal Medicine and Dermatology  [facility]      ____________________________________________    CC: Derm Problem (HS: Flaring in groin/Inner thighs: \"raw and opening\")    HPI:  Ms. Sophia De Leon is a(n) 39 year old female who presents today for follow-up  for HS.  Last seen at a virtual visit on 4/10/25 by me, at which time the patient restarted spironolactone (with goal dose of 75 mg every day), continued BPO wash, and continued dapsone gel     The patient notes that she has had numerous other health problems since she was last seen by dermatology. These include: bilateral TMJ pain, migraines, and iron-deficiency anemia.     Today, the patient states she has had quite a few flares since her last visits. She also notes two tender pilonidal cysts in her gluteal cleft. Her acne has been worse as well. She has been using the spironolactone, though she has only been able to tolerate 25 mg daily, as opposed to the goal dose of 75 mg. She states she gets lightheaded and weak when using 75 mg. She has also been using dapsone gel and benzoyl peroxide wash daily. She has not been able to get the resorcinol from the specialty pharmacy. The patient is interested in discussing oral dapsone.     Patient is otherwise feeling well, without " additional skin concerns.    HS Nurse Assessment        12/26/2024     2:19 PM 4/10/2025     3:18 PM 7/10/2025     2:43 PM   Nurse Assessment Data   Over the past 30 days how many old lesions flared back up? 10 5 4   Over the past 30 days how many new lesions did you get? 1 2 5   Over the past week, how many dressing changes do you do each day? 3+ 0 2   Over the past week, has your wound drainage been: Moderate Moderate Moderate   Rate your HS overall from 0 - 10 (0 = no disease, 10 = worst) over the past week:  9 9 8   Rate your pain score from 0 - 10 (0 = no disease, 10 = worst) for the most painful/symptomatic lesion in the past week:  8 8 8   Over the past week, how much has HS influenced your quality of life? very much extremely extremely     Labs Reviewed:  Last CBC, BMP, and LFTs from 6/17 reviewed and unremarkable.    Medications:  Current Outpatient Medications   Medication Sig Dispense Refill    Acetaminophen (TYLENOL PO) Take 1,000 mg by mouth as needed for mild pain PRN      benzoyl peroxide 5 % external liquid Apply topically daily. 236 mL 11    calcium carbonate 500 MG CHEW Take 1-2 tablets by mouth as needed (heartburn).      cetirizine (ZYRTEC) 10 MG tablet Take 10 mg by mouth daily as needed for allergies      cholecalciferol (VITAMIN D3) 125 mcg (5000 units) capsule Take 250 mcg by mouth. every 2nd or 3rd day      clobetasol (TEMOVATE) 0.05 % external ointment Apply to itchy area in groin twice a day as neeeded 60 g 0    clotrimazole (LOTRIMIN) 1 % external cream Apply topically 2 times daily. 45 g 11    clotrimazole (LOTRIMIN) 1 % external cream Apply topically 2 times daily 45 g 3    COLLAGEN PO Take 1-2 tablets by mouth daily. . Contains Calcium + Biotin      dapsone (ACZONE) 7.5 % gel Apply topically daily. 60 g 11    diclofenac (ASPERCREME ARTHRITIS PAIN) 1 % topical gel Apply topically as needed (arthritic pain)      famotidine (PEPCID) 20 MG tablet Take 1 tablet by mouth at bedtime.       fluticasone (FLONASE) 50 MCG/ACT nasal spray Spray 2 sprays in nostril as needed for rhinitis or allergies      IBUPROFEN PO Take 400 mg by mouth as needed for moderate pain PRN      ketoconazole (NIZORAL) 2 % external shampoo Apply topically once a week. 120 mL 11    Magnesium Oxide 250 MG tablet Take 1 tablet by mouth daily at 2 pm      meclizine (ANTIVERT) 12.5 MG tablet Take 12.5-25 mg by mouth 3 times daily as needed (vertigo)      ondansetron (ZOFRAN ODT) 4 MG ODT tab Place 4 mg under the tongue as needed for nausea (with migraines)      Riboflavin (B-2) 50 MG TABS Take 1 tablet by mouth daily at 2 pm      rizatriptan (MAXALT-MLT) 10 MG ODT Take 10 mg by mouth at onset of headache for migraine      spironolactone (ALDACTONE) 25 MG tablet Take 2 tablets (50 mg) by mouth daily. 270 tablet 2    tiZANidine (ZANAFLEX) 2 MG tablet TAKE 1/2 TO 1 TABLET BY MOUTH AT BEDTIME AS NEEDED FOR TENSION HEADACHE      urea (GORMEL) 20 % external cream Apply topically 2 times daily as needed (for feet dryness). 85 g 11    ferrous gluconate (FERGON) 324 (38 Fe) MG tablet Take 324 mg by mouth 2-3 times per week. Plus Vitamin C 500 mg chewable when taking. (Patient not taking: Reported on 7/10/2025)      nortriptyline (PAMELOR) 10 MG capsule TAKE 1 CAPSULE BY MOUTH AT 8 PM (Patient not taking: Reported on 7/10/2025)      Resorcinol POWD Resorcinol 15% in vaseline twice a day as needed for flares (Patient not taking: Reported on 7/10/2025) 30 g 11    tretinoin (RETIN-A) 0.025 % external cream Apply topically at bedtime. (Patient not taking: Reported on 7/10/2025) 45 g 1     No current facility-administered medications for this visit.      Past Medical/Surgical History:   Patient Active Problem List   Diagnosis    Acne vulgaris    Hidradenitis suppurativa    Migraine with aura and without status migrainosus, not intractable     CC Timmy Franco MD  [address] [place] [address] on close of this encounter.

## 2025-07-11 NOTE — PATIENT INSTRUCTIONS
PLAN  - Increase psironolactone to 25mg twice a day. If tolerating slowly to 50mg twice a day   - Will try to get Clascoterone 1 % CREAM twice a day approved for both HS and acne  - Continue dapsone gel twice a day as tolerated  - OKJ to continue benzoyl peroxide and salicylic acid washes      I recommend holding spironolactone for 3 days prior to surgery

## 2025-07-15 ENCOUNTER — TELEPHONE (OUTPATIENT)
Dept: DERMATOLOGY | Facility: CLINIC | Age: 40
End: 2025-07-15
Payer: COMMERCIAL

## 2025-07-15 NOTE — TELEPHONE ENCOUNTER
Prior Authorization Retail Medication Request    Medication/Dose: Clascoterone 1 % CREA  Diagnosis and ICD code (if different than what is on RX):    Acne vulgaris [L70.0]  - Primary      Hidradenitis suppurativa [L73.2]        New/renewal/insurance change PA/secondary ins. PA:  Previously Tried and Failed: see chart    Pharmacy Information (if different than what is on RX)  Name:    [id number] - [place], [place] - [address] [address] [facility] [address] [facility]

## 2025-07-21 NOTE — TELEPHONE ENCOUNTER
Prior Authorization Approval    Medication: WINLEVI 1 % EX CREA  Authorization Effective Date: 7/21/2025  Authorization Expiration Date: 7/21/2026  Approved Dose/Quantity: 60/30  Reference #: [id number]   Insurance Company: [employer] - Phone [phone] Fax [phone]  Expected CoPay: $    CoPay Card Available:      Financial Assistance Needed:   Which Pharm[facility] is filling the prescription[id number] - [place] - [address] [facility]  Pharmacy Notified: yes  Patient Notified: yes

## 2025-07-21 NOTE — TELEPHONE ENCOUNTER
PA Initiation    Medication: WINLEVI 1 % EX CREA  Insurance Company: [employer] - Phone [phone] Fax [phone]  [facility]arm[facility]illing the Rx[id number] - [place]  [address] [address] [facility]  Filling Pharmacy Phone: [phone]  Filling Pharmacy Fax:    Start Date: 7/21/2025

## (undated) RX ORDER — ACETAMINOPHEN 500 MG
TABLET ORAL
Status: DISPENSED
Start: 2019-06-25